# Patient Record
Sex: FEMALE | Race: OTHER | HISPANIC OR LATINO | URBAN - METROPOLITAN AREA
[De-identification: names, ages, dates, MRNs, and addresses within clinical notes are randomized per-mention and may not be internally consistent; named-entity substitution may affect disease eponyms.]

---

## 2021-10-31 ENCOUNTER — EMERGENCY (EMERGENCY)
Facility: HOSPITAL | Age: 36
LOS: 1 days | Discharge: HOME | End: 2021-10-31
Attending: STUDENT IN AN ORGANIZED HEALTH CARE EDUCATION/TRAINING PROGRAM | Admitting: EMERGENCY MEDICINE
Payer: MEDICAID

## 2021-10-31 VITALS
OXYGEN SATURATION: 100 % | RESPIRATION RATE: 17 BRPM | TEMPERATURE: 98 F | HEART RATE: 95 BPM | SYSTOLIC BLOOD PRESSURE: 123 MMHG | DIASTOLIC BLOOD PRESSURE: 69 MMHG

## 2021-10-31 LAB
ALBUMIN SERPL ELPH-MCNC: 4.3 G/DL — SIGNIFICANT CHANGE UP (ref 3.3–5)
ALP SERPL-CCNC: 95 U/L — SIGNIFICANT CHANGE UP (ref 40–120)
ALT FLD-CCNC: 18 U/L — SIGNIFICANT CHANGE UP (ref 4–33)
ANION GAP SERPL CALC-SCNC: 14 MMOL/L — SIGNIFICANT CHANGE UP (ref 7–14)
APPEARANCE UR: ABNORMAL
AST SERPL-CCNC: 18 U/L — SIGNIFICANT CHANGE UP (ref 4–32)
B PERT DNA SPEC QL NAA+PROBE: SIGNIFICANT CHANGE UP
B PERT+PARAPERT DNA PNL SPEC NAA+PROBE: SIGNIFICANT CHANGE UP
BASOPHILS # BLD AUTO: 0.05 K/UL — SIGNIFICANT CHANGE UP (ref 0–0.2)
BASOPHILS NFR BLD AUTO: 0.4 % — SIGNIFICANT CHANGE UP (ref 0–2)
BILIRUB SERPL-MCNC: <0.2 MG/DL — SIGNIFICANT CHANGE UP (ref 0.2–1.2)
BILIRUB UR-MCNC: NEGATIVE — SIGNIFICANT CHANGE UP
BLOOD GAS VENOUS COMPREHENSIVE RESULT: SIGNIFICANT CHANGE UP
BORDETELLA PARAPERTUSSIS (RAPRVP): SIGNIFICANT CHANGE UP
BUN SERPL-MCNC: 13 MG/DL — SIGNIFICANT CHANGE UP (ref 7–23)
C PNEUM DNA SPEC QL NAA+PROBE: SIGNIFICANT CHANGE UP
CALCIUM SERPL-MCNC: 9.3 MG/DL — SIGNIFICANT CHANGE UP (ref 8.4–10.5)
CHLORIDE SERPL-SCNC: 103 MMOL/L — SIGNIFICANT CHANGE UP (ref 98–107)
CO2 SERPL-SCNC: 20 MMOL/L — LOW (ref 22–31)
COLOR SPEC: SIGNIFICANT CHANGE UP
CREAT SERPL-MCNC: 0.53 MG/DL — SIGNIFICANT CHANGE UP (ref 0.5–1.3)
DIFF PNL FLD: ABNORMAL
EOSINOPHIL # BLD AUTO: 0.17 K/UL — SIGNIFICANT CHANGE UP (ref 0–0.5)
EOSINOPHIL NFR BLD AUTO: 1.3 % — SIGNIFICANT CHANGE UP (ref 0–6)
FLUAV SUBTYP SPEC NAA+PROBE: SIGNIFICANT CHANGE UP
FLUBV RNA SPEC QL NAA+PROBE: SIGNIFICANT CHANGE UP
GLUCOSE SERPL-MCNC: 93 MG/DL — SIGNIFICANT CHANGE UP (ref 70–99)
GLUCOSE UR QL: NEGATIVE — SIGNIFICANT CHANGE UP
HADV DNA SPEC QL NAA+PROBE: SIGNIFICANT CHANGE UP
HCG SERPL-ACNC: <5 MIU/ML — SIGNIFICANT CHANGE UP
HCOV 229E RNA SPEC QL NAA+PROBE: SIGNIFICANT CHANGE UP
HCOV HKU1 RNA SPEC QL NAA+PROBE: SIGNIFICANT CHANGE UP
HCOV NL63 RNA SPEC QL NAA+PROBE: SIGNIFICANT CHANGE UP
HCOV OC43 RNA SPEC QL NAA+PROBE: SIGNIFICANT CHANGE UP
HCT VFR BLD CALC: 39.4 % — SIGNIFICANT CHANGE UP (ref 34.5–45)
HGB BLD-MCNC: 12.3 G/DL — SIGNIFICANT CHANGE UP (ref 11.5–15.5)
HMPV RNA SPEC QL NAA+PROBE: SIGNIFICANT CHANGE UP
HPIV1 RNA SPEC QL NAA+PROBE: SIGNIFICANT CHANGE UP
HPIV2 RNA SPEC QL NAA+PROBE: SIGNIFICANT CHANGE UP
HPIV3 RNA SPEC QL NAA+PROBE: SIGNIFICANT CHANGE UP
HPIV4 RNA SPEC QL NAA+PROBE: SIGNIFICANT CHANGE UP
IANC: 9.64 K/UL — HIGH (ref 1.5–8.5)
IMM GRANULOCYTES NFR BLD AUTO: 0.4 % — SIGNIFICANT CHANGE UP (ref 0–1.5)
KETONES UR-MCNC: NEGATIVE — SIGNIFICANT CHANGE UP
LEUKOCYTE ESTERASE UR-ACNC: ABNORMAL
LYMPHOCYTES # BLD AUTO: 19 % — SIGNIFICANT CHANGE UP (ref 13–44)
LYMPHOCYTES # BLD AUTO: 2.52 K/UL — SIGNIFICANT CHANGE UP (ref 1–3.3)
M PNEUMO DNA SPEC QL NAA+PROBE: SIGNIFICANT CHANGE UP
MCHC RBC-ENTMCNC: 25.7 PG — LOW (ref 27–34)
MCHC RBC-ENTMCNC: 31.2 GM/DL — LOW (ref 32–36)
MCV RBC AUTO: 82.4 FL — SIGNIFICANT CHANGE UP (ref 80–100)
MONOCYTES # BLD AUTO: 0.81 K/UL — SIGNIFICANT CHANGE UP (ref 0–0.9)
MONOCYTES NFR BLD AUTO: 6.1 % — SIGNIFICANT CHANGE UP (ref 2–14)
NEUTROPHILS # BLD AUTO: 9.64 K/UL — HIGH (ref 1.8–7.4)
NEUTROPHILS NFR BLD AUTO: 72.8 % — SIGNIFICANT CHANGE UP (ref 43–77)
NITRITE UR-MCNC: NEGATIVE — SIGNIFICANT CHANGE UP
NRBC # BLD: 0 /100 WBCS — SIGNIFICANT CHANGE UP
NRBC # FLD: 0 K/UL — SIGNIFICANT CHANGE UP
PH UR: 7 — SIGNIFICANT CHANGE UP (ref 5–8)
PLATELET # BLD AUTO: 378 K/UL — SIGNIFICANT CHANGE UP (ref 150–400)
POTASSIUM SERPL-MCNC: 4.4 MMOL/L — SIGNIFICANT CHANGE UP (ref 3.5–5.3)
POTASSIUM SERPL-SCNC: 4.4 MMOL/L — SIGNIFICANT CHANGE UP (ref 3.5–5.3)
PROT SERPL-MCNC: 7.7 G/DL — SIGNIFICANT CHANGE UP (ref 6–8.3)
PROT UR-MCNC: ABNORMAL
RAPID RVP RESULT: SIGNIFICANT CHANGE UP
RBC # BLD: 4.78 M/UL — SIGNIFICANT CHANGE UP (ref 3.8–5.2)
RBC # FLD: 14.8 % — HIGH (ref 10.3–14.5)
RSV RNA SPEC QL NAA+PROBE: SIGNIFICANT CHANGE UP
RV+EV RNA SPEC QL NAA+PROBE: SIGNIFICANT CHANGE UP
SARS-COV-2 RNA SPEC QL NAA+PROBE: SIGNIFICANT CHANGE UP
SODIUM SERPL-SCNC: 137 MMOL/L — SIGNIFICANT CHANGE UP (ref 135–145)
SP GR SPEC: 1.02 — SIGNIFICANT CHANGE UP (ref 1–1.05)
UROBILINOGEN FLD QL: SIGNIFICANT CHANGE UP
WBC # BLD: 13.24 K/UL — HIGH (ref 3.8–10.5)
WBC # FLD AUTO: 13.24 K/UL — HIGH (ref 3.8–10.5)

## 2021-10-31 PROCEDURE — 74177 CT ABD & PELVIS W/CONTRAST: CPT | Mod: 26,MA

## 2021-10-31 RX ORDER — ACETAMINOPHEN 500 MG
650 TABLET ORAL ONCE
Refills: 0 | Status: COMPLETED | OUTPATIENT
Start: 2021-10-31 | End: 2021-10-31

## 2021-10-31 RX ORDER — SODIUM CHLORIDE 9 MG/ML
1000 INJECTION INTRAMUSCULAR; INTRAVENOUS; SUBCUTANEOUS ONCE
Refills: 0 | Status: COMPLETED | OUTPATIENT
Start: 2021-10-31 | End: 2021-10-31

## 2021-10-31 RX ORDER — CEFTRIAXONE 500 MG/1
1000 INJECTION, POWDER, FOR SOLUTION INTRAMUSCULAR; INTRAVENOUS ONCE
Refills: 0 | Status: COMPLETED | OUTPATIENT
Start: 2021-10-31 | End: 2021-10-31

## 2021-10-31 RX ORDER — MORPHINE SULFATE 50 MG/1
2 CAPSULE, EXTENDED RELEASE ORAL ONCE
Refills: 0 | Status: DISCONTINUED | OUTPATIENT
Start: 2021-10-31 | End: 2021-10-31

## 2021-10-31 RX ORDER — KETOROLAC TROMETHAMINE 30 MG/ML
15 SYRINGE (ML) INJECTION ONCE
Refills: 0 | Status: DISCONTINUED | OUTPATIENT
Start: 2021-10-31 | End: 2021-10-31

## 2021-10-31 RX ADMIN — Medication 650 MILLIGRAM(S): at 23:24

## 2021-10-31 RX ADMIN — Medication 15 MILLIGRAM(S): at 21:13

## 2021-10-31 RX ADMIN — MORPHINE SULFATE 2 MILLIGRAM(S): 50 CAPSULE, EXTENDED RELEASE ORAL at 23:43

## 2021-10-31 RX ADMIN — SODIUM CHLORIDE 1000 MILLILITER(S): 9 INJECTION INTRAMUSCULAR; INTRAVENOUS; SUBCUTANEOUS at 20:05

## 2021-10-31 RX ADMIN — CEFTRIAXONE 100 MILLIGRAM(S): 500 INJECTION, POWDER, FOR SOLUTION INTRAMUSCULAR; INTRAVENOUS at 23:33

## 2021-10-31 RX ADMIN — Medication 650 MILLIGRAM(S): at 21:07

## 2021-10-31 NOTE — ED PROVIDER NOTE - OBJECTIVE STATEMENT
36yF w/no stated pmhx presenting with dysuria and right flank pain x 2 days. Pt states 3 days ago she noticed burning with urination and frequent urination. States she took an antibiotic from a friend, 2 doses, does not know which one. States she then developed right sided back/flank pain with radiation to the RLQ and upper right leg. Pt states pain is constant and at times sharp associated with nausea. Pt has been taking Ibuprofen and tylenol at home for pain without relief. She reports chills with urination. Pt denies fever, weakness, vomiting, diarrhea, vaginal discharge, cp, sob, headache, dizziness, recent travel or illness or any other concerns.

## 2021-10-31 NOTE — ED ADULT NURSE NOTE - NS ED NURSE RECORD ANOTHER HT AND WT
Patient is here today for a follow up on her right foot pain   She was trying on some shoes last week, when her toes \"curled down\" in a \"spasm\" No

## 2021-10-31 NOTE — ED PROVIDER NOTE - CLINICAL SUMMARY MEDICAL DECISION MAKING FREE TEXT BOX
36yF w/no stated pmhx presenting with dysuria and right flank pain x 2 days. On exam pt appears uncomfortable, vitals within normal, afebrile, +right CVA tenderness, RLQ/RUQ/suprapubic ttp. Concern for pyelo, less likely appy. Plan: cbc/cmp/vbg, ua/ucx, CT abd/pelvis, IV fluids, pain control, will reassess.

## 2021-10-31 NOTE — ED PROVIDER NOTE - PROGRESS NOTE DETAILS
MITCHELL Rebolledo: CT consistent with pyelo, pt states pain improved after morphine although continues to be uncomfortable. Will keep in CDU for observation and pain control. Marcelle DWYER: (Back Charting) Pt signed out to my colleague Dr. Schulte pending labs ct studies reassess thereafter.

## 2021-10-31 NOTE — ED PROVIDER NOTE - ATTENDING CONTRIBUTION TO CARE
I have personally performed a face to face medical and diagnostic evaluation of the patient. I have discussed with and reviewed the ACP's note and agree with the History, ROS, Physical Exam and MDM unless otherwise indicated. A brief summary of my personal evaluation and impression can be found below.    36F no pmh presents w dysuria and R flank pain x2 days but thinks she had burning with urination for last week thinks c/w prior uti/stone? took x2 abx unknown what kind yesterday w/o improvement, on interval has developed R flank and back pain, nausea no vomiting tolerating PO pain radiates to lower abdomen but she reports pain is worse to R flank.     All other ROS negative, except as above and as per HPI and ROS section.    VITALS: Initial triage and subsequent vitals have been reviewed by me.  GEN APPEARANCE: WDWN, alert, non-toxic, NAD  HEAD: Atraumatic.  EYES: PERRLa, EOMI, vision grossly intact.   NECK: Supple  CV: RRR, S1S2, no c/r/m/g. Cap refill <2 seconds. No bruits.   LUNGS: CTAB. No abnormal breath sounds.  ABDOMEN: mild RUQ ttp.   MSK/EXT: No spinal or extremity point tenderness. R CVA ttp. Pelvis stable. No peripheral edema.  NEURO: Alert, follows commands. Weight bearing normal. Speech normal. Sensation and motor normal x4 extremities.   SKIN: Warm, dry and intact. No rash.  PSYCH: Appropriate    Plan/MDM: 36F no pmh presents w dysuria and R flank pain x2 days but thinks she had burning with urination for last week thinks c/w prior uti/stone? took x2 abx unknown what kind yesterday w/o improvement, on interval has developed R flank and back pain, nausea no vomiting tolerating PO pain radiates to lower abdomen but she reports pain is worse to R flank exam vss non toxic PE as above ddx c/f likely uti/pyelo less c/f intraabdominal surg path plan to start with labs urine ct, reassess thereafter consider ruq/tvus US if initial workup nondx.

## 2021-10-31 NOTE — ED ADULT TRIAGE NOTE - CHIEF COMPLAINT QUOTE
Pt c/o right sided flank pain starting 2 days ago worsening today. Pt also endorsing mild dysuria. Denies hematuria. Pt appears uncomfortable

## 2021-11-01 VITALS
RESPIRATION RATE: 16 BRPM | HEART RATE: 75 BPM | TEMPERATURE: 98 F | OXYGEN SATURATION: 100 % | SYSTOLIC BLOOD PRESSURE: 104 MMHG | DIASTOLIC BLOOD PRESSURE: 52 MMHG

## 2021-11-01 PROCEDURE — 99236 HOSP IP/OBS SAME DATE HI 85: CPT

## 2021-11-01 RX ORDER — OXYCODONE AND ACETAMINOPHEN 5; 325 MG/1; MG/1
1 TABLET ORAL EVERY 4 HOURS
Refills: 0 | Status: DISCONTINUED | OUTPATIENT
Start: 2021-11-01 | End: 2021-11-01

## 2021-11-01 RX ORDER — CEFTRIAXONE 500 MG/1
1000 INJECTION, POWDER, FOR SOLUTION INTRAMUSCULAR; INTRAVENOUS EVERY 24 HOURS
Refills: 0 | Status: DISCONTINUED | OUTPATIENT
Start: 2021-11-01 | End: 2021-11-04

## 2021-11-01 RX ORDER — SODIUM CHLORIDE 9 MG/ML
1000 INJECTION, SOLUTION INTRAVENOUS
Refills: 0 | Status: DISCONTINUED | OUTPATIENT
Start: 2021-11-01 | End: 2021-11-04

## 2021-11-01 RX ORDER — KETOROLAC TROMETHAMINE 30 MG/ML
30 SYRINGE (ML) INJECTION EVERY 6 HOURS
Refills: 0 | Status: DISCONTINUED | OUTPATIENT
Start: 2021-11-01 | End: 2021-11-01

## 2021-11-01 RX ORDER — CEFUROXIME AXETIL 250 MG
1 TABLET ORAL
Qty: 14 | Refills: 0
Start: 2021-11-01 | End: 2021-11-07

## 2021-11-01 RX ADMIN — OXYCODONE AND ACETAMINOPHEN 1 TABLET(S): 5; 325 TABLET ORAL at 04:00

## 2021-11-01 RX ADMIN — OXYCODONE AND ACETAMINOPHEN 1 TABLET(S): 5; 325 TABLET ORAL at 02:43

## 2021-11-01 RX ADMIN — SODIUM CHLORIDE 75 MILLILITER(S): 9 INJECTION, SOLUTION INTRAVENOUS at 02:31

## 2021-11-01 NOTE — ED CDU PROVIDER INITIAL DAY NOTE - MEDICAL DECISION MAKING DETAILS
#pyelonephritis   plan  - IV ceftriaxone   - analgesia - trial PO meds   - ivf  - d/c on Cefpodoxime

## 2021-11-01 NOTE — ED CDU PROVIDER INITIAL DAY NOTE - NSICDXNOFAMILYHX_GEN_ALL_ED
Airway  Urgency: elective      General Information and Staff    Patient location during procedure: OR  Anesthesiologist: Cristian Reyez DO  Performed: anesthesiologist     Indications and Patient Condition  Indications for airway management: anesthesia  Sedat
<-- Click to add NO pertinent Family History

## 2021-11-01 NOTE — ED CDU PROVIDER INITIAL DAY NOTE - OBJECTIVE STATEMENT
36yF w/no stated pmhx presenting with dysuria and right flank pain x 2 days. Pt states 3 days ago she noticed burning with urination and frequent urination. States she took an antibiotic from a friend, 2 doses, does not know which one. States she then developed right sided back/flank pain with radiation to the RLQ and upper right leg. Pt states pain is constant and at times sharp associated with nausea. Pt has been taking Ibuprofen and tylenol at home for pain without relief. She reports chills with urination. Pt denies fever, weakness, vomiting, diarrhea, vaginal discharge, cp, sob, headache, dizziness, recent travel or illness or any other concerns.  While in ED, Labs shows leukocytosis. CTAP shows R pyelonephritis. Pt given rocephin. Pain controlled with IV analgesia. Plan to keep in CDU overnight for continued pain control and IV abx. If pain controlled tomorrow with PO meds can likely be d/c with PO abx. Pt well appearing.

## 2021-11-01 NOTE — ED CDU PROVIDER INITIAL DAY NOTE - ATTENDING CONTRIBUTION TO CARE
I have personally performed a face to face medical and diagnostic evaluation of the patient. I have discussed with and reviewed the ACP's note and agree with the History, ROS, Physical Exam and MDM unless otherwise indicated. A brief summary of my personal evaluation and impression can be found below.    Please see initial ED provider note for further details.

## 2021-11-01 NOTE — ED CDU PROVIDER DISPOSITION NOTE - CLINICAL COURSE
35 y/o female with no pmhx presents to ED c/o right flank pain and dysuria x 3 days. CT with pyelonephritis vs passed kidney stone. pain well controlled in CDU. feeling better. plan to dc home with cefuroxime.

## 2021-11-01 NOTE — ED CDU PROVIDER DISPOSITION NOTE - PATIENT PORTAL LINK FT
You can access the FollowMyHealth Patient Portal offered by [facility] by registering at the following website: [email or web address]. By joining [facility]’s FollowMyHealth portal, you will also be able to view your health information using other applications (apps) compatible with our system.

## 2021-11-01 NOTE — ED CDU PROVIDER DISPOSITION NOTE - NSFOLLOWUPINSTRUCTIONS_ED_ALL_ED_FT
Follow up with your primary care provider within 48 hours.  Take copy of results with you    Take Cefuroxime antibiotic as prescribed    Worsening, continued or new concerning symptoms return to the emergency department.

## 2021-11-03 LAB
CULTURE RESULTS: SIGNIFICANT CHANGE UP
SPECIMEN SOURCE: SIGNIFICANT CHANGE UP

## 2021-11-25 ENCOUNTER — EMERGENCY (EMERGENCY)
Facility: HOSPITAL | Age: 36
LOS: 1 days | Discharge: HOME | End: 2021-11-25
Attending: EMERGENCY MEDICINE | Admitting: EMERGENCY MEDICINE
Payer: MEDICAID

## 2021-11-25 VITALS
DIASTOLIC BLOOD PRESSURE: 77 MMHG | OXYGEN SATURATION: 97 % | HEART RATE: 95 BPM | TEMPERATURE: 98 F | RESPIRATION RATE: 18 BRPM | SYSTOLIC BLOOD PRESSURE: 119 MMHG

## 2021-11-25 LAB
BASOPHILS # BLD AUTO: 0.05 K/UL — SIGNIFICANT CHANGE UP (ref 0–0.2)
BASOPHILS NFR BLD AUTO: 0.5 % — SIGNIFICANT CHANGE UP (ref 0–2)
BLOOD GAS VENOUS COMPREHENSIVE RESULT: SIGNIFICANT CHANGE UP
EOSINOPHIL # BLD AUTO: 0.09 K/UL — SIGNIFICANT CHANGE UP (ref 0–0.5)
EOSINOPHIL NFR BLD AUTO: 0.9 % — SIGNIFICANT CHANGE UP (ref 0–6)
HCT VFR BLD CALC: 39.2 % — SIGNIFICANT CHANGE UP (ref 34.5–45)
HGB BLD-MCNC: 12.6 G/DL — SIGNIFICANT CHANGE UP (ref 11.5–15.5)
IANC: 7.73 K/UL — SIGNIFICANT CHANGE UP (ref 1.5–8.5)
IMM GRANULOCYTES NFR BLD AUTO: 0.3 % — SIGNIFICANT CHANGE UP (ref 0–1.5)
LYMPHOCYTES # BLD AUTO: 2.1 K/UL — SIGNIFICANT CHANGE UP (ref 1–3.3)
LYMPHOCYTES # BLD AUTO: 20.1 % — SIGNIFICANT CHANGE UP (ref 13–44)
MCHC RBC-ENTMCNC: 26.1 PG — LOW (ref 27–34)
MCHC RBC-ENTMCNC: 32.1 GM/DL — SIGNIFICANT CHANGE UP (ref 32–36)
MCV RBC AUTO: 81.2 FL — SIGNIFICANT CHANGE UP (ref 80–100)
MONOCYTES # BLD AUTO: 0.44 K/UL — SIGNIFICANT CHANGE UP (ref 0–0.9)
MONOCYTES NFR BLD AUTO: 4.2 % — SIGNIFICANT CHANGE UP (ref 2–14)
NEUTROPHILS # BLD AUTO: 7.73 K/UL — HIGH (ref 1.8–7.4)
NEUTROPHILS NFR BLD AUTO: 74 % — SIGNIFICANT CHANGE UP (ref 43–77)
NRBC # BLD: 0 /100 WBCS — SIGNIFICANT CHANGE UP
NRBC # FLD: 0 K/UL — SIGNIFICANT CHANGE UP
PLATELET # BLD AUTO: 406 K/UL — HIGH (ref 150–400)
RBC # BLD: 4.83 M/UL — SIGNIFICANT CHANGE UP (ref 3.8–5.2)
RBC # FLD: 14.9 % — HIGH (ref 10.3–14.5)
WBC # BLD: 10.44 K/UL — SIGNIFICANT CHANGE UP (ref 3.8–10.5)
WBC # FLD AUTO: 10.44 K/UL — SIGNIFICANT CHANGE UP (ref 3.8–10.5)

## 2021-11-25 PROCEDURE — 99285 EMERGENCY DEPT VISIT HI MDM: CPT

## 2021-11-25 RX ORDER — KETOROLAC TROMETHAMINE 30 MG/ML
30 SYRINGE (ML) INJECTION ONCE
Refills: 0 | Status: DISCONTINUED | OUTPATIENT
Start: 2021-11-25 | End: 2021-11-25

## 2021-11-25 RX ORDER — SODIUM CHLORIDE 9 MG/ML
1000 INJECTION INTRAMUSCULAR; INTRAVENOUS; SUBCUTANEOUS ONCE
Refills: 0 | Status: COMPLETED | OUTPATIENT
Start: 2021-11-25 | End: 2021-11-25

## 2021-11-25 RX ADMIN — Medication 30 MILLIGRAM(S): at 21:55

## 2021-11-25 RX ADMIN — Medication 30 MILLIGRAM(S): at 22:10

## 2021-11-25 RX ADMIN — SODIUM CHLORIDE 1000 MILLILITER(S): 9 INJECTION INTRAMUSCULAR; INTRAVENOUS; SUBCUTANEOUS at 21:55

## 2021-11-25 NOTE — ED PROVIDER NOTE - OBJECTIVE STATEMENT
HPI- Patient is a 36 y.o female with no known PMHx who presents to ED c/o Rt side flank pain and dysuria. Pt states that yesterday she noticed some mild dysuria and then today she was eating dinner and suddenly got this sharp Rt side flank pain. Admits to taking tylenol with little relief. Pt seen here 2 weeks ago and was admitted to CDU for flank pain and pyelonephritis and was treated with cefuroxime. Pt notes some chills. Denies fevers, n/v/d, melena, hematuria, vaginal bleeding/discharge, dizziness, weakness, neck pain, headaches, cp, sob, URI sx or any other complaints.

## 2021-11-25 NOTE — ED ADULT NURSE NOTE - NSSUHOSCREENINGYN_ED_ALL_ED
Greyson Hudson III 51 y.o. male with HIV, ESRD T/TH/S, HIV, HTN, HLD presents to the hospital with a chief complaint of seizure. He reports experiencing a seizure on HD today. He is unsure of how long it lasted, he reports he returned to baseline after. He denies any complaints currently. He repors he immediately returned to baseline after seizure. He denies frequent seizure activity. He had one seizure 2 weeks ago, but reports prior to that he did not have one for many years. He is unsure of what medications he takes. He dialyzes for 3.5hrs at [facility]. He reports his access is a left arm graft. He still makes urine. He denies any benadryl use. He reports he does not sleep at night. He is unsure of his pre/post weight and fluid removal. He endorses seizure. He denies any other complaints. He denies any active bleeding.    In the ED, hemoglobin 6.7, potassium 4.4, Cr of 10, , lactate 1.4, chest x-ray without acute process.   
Yes - the patient is able to be screened

## 2021-11-25 NOTE — ED ADULT NURSE NOTE - NSFALLRSKASSESSTYPE_ED_ALL_ED
51M smoker with hx HTN, HLD (Untreated), family history of premature CAD (Identical twin brother  of MI at age 42) presented to ED with 3 weeks history of CP       Chest Pain:   Trop neg  EKG No acute changes  CTA of Coronary Arteries planned  ASA    HTN  Moniotor BP    Smoking  Cessation d/w pt    HLD  Ccheck Lipids, statin Initial (On Arrival)

## 2021-11-25 NOTE — ED ADULT NURSE NOTE - OBJECTIVE STATEMENT
35yo female received in 3. pt A&Ox4, ambulatory, hx of kidney stones. 2 weeks ago pt stated that she was in the ED and was treated for UTI. pt stated that she completed 7 days course of antibiotics. Pt stated that today around 6 pm she developed left flank pain.  pt denies nauseas, vomiting, and diarrhea. Denies hematuria but reported dysuria. 20G Iv placed in LAC, lab drawn, urine obtained and sent. . Side rails up, bed at lowest position, call bell within reach, patient oriented to the unit, safety maintained. 37yo female received in 3. pt A&Ox4, ambulatory, hx of kidney stones. 2 weeks ago pt stated that she was in the ED and was treated for UTI. pt stated that she completed 7 days course of antibiotics. Pt stated that today around 6 pm she developed left flank pain.  pt denies nauseas, vomiting, and diarrhea. Denies hematuria but reported dysuria. 20G Iv placed in L hand, lab drawn, urine obtained and sent. . Side rails up, bed at lowest position, call bell within reach, patient oriented to the unit, safety maintained.

## 2021-11-25 NOTE — ED PROVIDER NOTE - NSFOLLOWUPINSTRUCTIONS_ED_ALL_ED_FT
Patient advised to follow up with PRIMARY CARE DOCTOR IN 1-2 DAYS AND UROLOGIST AND GASTROENTEROLOGIST  and told to return to the emergency department immediately for any new or concerning symptoms such as FEVERS, CHILLS, NAUSEA, VOMITING, WORSENING PAIN OR ANY OTHER COMPLAINTS. Patient agrees with plan.      Rest, stay hydrated   Take antibiotic as directed   urology clinic (call [phone] to make an appointment)    Advance activity as tolerated.  Continue all previously prescribed medications as directed unless otherwise instructed.  Follow up with your primary care physician in 48-72 hours- bring copies of your results.  Return to the ER for worsening or persistent symptoms, and/or ANY NEW OR CONCERNING SYMPTOMS. If you have issues obtaining follow up, please call: [phone] to obtain a doctor or specialist who takes your insurance in your area.  You may call [phone] to make an appointment with the internal medicine clinic.

## 2021-11-25 NOTE — ED PROVIDER NOTE - ATTENDING CONTRIBUTION TO CARE
I have seen and examined the patient on the patient´s visit date. I have reviewed the note written by Miguelina [facility], on that visit day. I have supervised and participated as necessary in the performance of procedures indicated for patient management and was available at all phases of the patient´s visit when needed. We discussed the history, physical exam findings, management plan, and  medical decision making. I have made my additions, exceptions, and revisions within the chart and I agree with H and P as documented in its entirety. The data and my interpretation of any data collected from labs, interventions and imaging appear below as well as my independent medical decision making and considerations    The patient is a 36y Female who has no pertinent past medical history PTED with Flank pain as described    Vital Signs Stable  PE: as described; my additions and exceptions are noted in the chart  DATA:    RESULTS: All significant/relevant labs and imaging results present during the time of evaluation have been entered into chart through pullset function and are discussed below    MDM:  IMPRESSION: IPMN during screening for flank pain  Considerations; No immediate concerns but need for followup and imaging stressed; pt understands    PLAN  symptomatic treatment  RTED PRN   F/u pcp/gi for repeat imaging as recommended

## 2021-11-25 NOTE — ED ADULT TRIAGE NOTE - CHIEF COMPLAINT QUOTE
C/o intermittent right flank pain for 2-3 weeks. Endorses dysuria and chills for 2 days. States "I was here 2 weeks ago and told I have a kidney infection but the pain is back." Denies fever. Returned from [facility] on 11/12.

## 2021-11-25 NOTE — ED PROVIDER NOTE - PHYSICAL EXAMINATION
Vital signs reviewed.   CONSTITUTIONAL: Well-appearing; well-nourished; in no apparent distress. Non-toxic appearing.   HEAD: Normocephalic, atraumatic.  EYES: PERRL, EOM intact, conjunctiva and sclera WNL.  CARD: Normal S1, S2; no murmurs, rubs, or gallops noted.  RESP: Normal chest excursion with respiration; breath sounds clear and equal bilaterally; no wheezes, rhonchi, or rales.  ABD/GI: soft, non-distended; +Rt CVAT tenderness.   EXT/MS: moves all extremities  SKIN: Normal for age and race  NEURO: Awake, alert, oriented x 3, no gross deficits  PSYCH: Normal mood; appropriate affect.

## 2021-11-25 NOTE — ED PROVIDER NOTE - PROGRESS NOTE DETAILS
PA Villeda- Results of CT ? for IPMN. No acute infection. No obstruction stone. Pt labs WNL. Vitals stable. Pt stable for outpatient f/u with Urology and GI. All questions and concerns addressed. Strict return instructions given. No complaints noted.

## 2021-11-25 NOTE — ED PROVIDER NOTE - PATIENT PORTAL LINK FT
You can access the FollowMyHealth Patient Portal offered by [facility] by registering at the following website: [email or web address]. By joining [facility]’s FollowMyHealth portal, you will also be able to view your health information using other applications (apps) compatible with our system.

## 2021-11-25 NOTE — ED ADULT NURSE NOTE - CHIEF COMPLAINT QUOTE
C/o intermittent right flank pain for 2-3 weeks. Endorses dysuria and chills for 2 days. States "I was here 2 weeks ago and told I have a kidney infection but the pain is back." Denies fever. Returned from [facility] on 11/12.

## 2021-11-25 NOTE — ED PROVIDER NOTE - CLINICAL SUMMARY MEDICAL DECISION MAKING FREE TEXT BOX
HPI- Patient is a 36 y.o female with no known PMHx who presents to ED c/o Rt side flank pain and dysuria. Pt states that yesterday she noticed some mild dysuria and then today she was eating dinner and suddenly got this sharp Rt side flank pain. Admits to taking tylenol with little relief. Pt seen here 2 weeks ago and was admitted to CDU for flank pain and pyelonephritis and was treated with cefuroxime. Pt notes some chills. Denies fevers, n/v/d, melena, hematuria, vaginal bleeding/discharge, dizziness, weakness, neck pain, headaches, cp, sob, URI sx or any other complaints.     DDx- Renal colic vs pyelo. Plan for stone hunt, labs, UA/UC, fluids and analgesic. Will monitor and reassess.

## 2021-11-26 VITALS
SYSTOLIC BLOOD PRESSURE: 108 MMHG | HEART RATE: 84 BPM | OXYGEN SATURATION: 99 % | TEMPERATURE: 98 F | DIASTOLIC BLOOD PRESSURE: 78 MMHG | RESPIRATION RATE: 18 BRPM

## 2021-11-26 LAB
ALBUMIN SERPL ELPH-MCNC: 4.1 G/DL — SIGNIFICANT CHANGE UP (ref 3.3–5)
ALP SERPL-CCNC: 96 U/L — SIGNIFICANT CHANGE UP (ref 40–120)
ALT FLD-CCNC: 20 U/L — SIGNIFICANT CHANGE UP (ref 4–33)
ANION GAP SERPL CALC-SCNC: 11 MMOL/L — SIGNIFICANT CHANGE UP (ref 7–14)
APPEARANCE UR: CLEAR — SIGNIFICANT CHANGE UP
AST SERPL-CCNC: 20 U/L — SIGNIFICANT CHANGE UP (ref 4–32)
BILIRUB SERPL-MCNC: <0.2 MG/DL — SIGNIFICANT CHANGE UP (ref 0.2–1.2)
BILIRUB UR-MCNC: NEGATIVE — SIGNIFICANT CHANGE UP
BUN SERPL-MCNC: 17 MG/DL — SIGNIFICANT CHANGE UP (ref 7–23)
CALCIUM SERPL-MCNC: 9.5 MG/DL — SIGNIFICANT CHANGE UP (ref 8.4–10.5)
CHLORIDE SERPL-SCNC: 102 MMOL/L — SIGNIFICANT CHANGE UP (ref 98–107)
CO2 SERPL-SCNC: 23 MMOL/L — SIGNIFICANT CHANGE UP (ref 22–31)
COLOR SPEC: YELLOW — SIGNIFICANT CHANGE UP
CREAT SERPL-MCNC: 0.71 MG/DL — SIGNIFICANT CHANGE UP (ref 0.5–1.3)
DIFF PNL FLD: ABNORMAL
GLUCOSE SERPL-MCNC: 107 MG/DL — HIGH (ref 70–99)
GLUCOSE UR QL: NEGATIVE — SIGNIFICANT CHANGE UP
KETONES UR-MCNC: NEGATIVE — SIGNIFICANT CHANGE UP
LEUKOCYTE ESTERASE UR-ACNC: ABNORMAL
LIDOCAIN IGE QN: 31 U/L — SIGNIFICANT CHANGE UP (ref 7–60)
NITRITE UR-MCNC: NEGATIVE — SIGNIFICANT CHANGE UP
PH UR: 6 — SIGNIFICANT CHANGE UP (ref 5–8)
POTASSIUM SERPL-MCNC: 4 MMOL/L — SIGNIFICANT CHANGE UP (ref 3.5–5.3)
POTASSIUM SERPL-SCNC: 4 MMOL/L — SIGNIFICANT CHANGE UP (ref 3.5–5.3)
PROT SERPL-MCNC: 7.4 G/DL — SIGNIFICANT CHANGE UP (ref 6–8.3)
PROT UR-MCNC: ABNORMAL
SODIUM SERPL-SCNC: 136 MMOL/L — SIGNIFICANT CHANGE UP (ref 135–145)
SP GR SPEC: 1.04 — SIGNIFICANT CHANGE UP (ref 1–1.05)
UROBILINOGEN FLD QL: SIGNIFICANT CHANGE UP

## 2021-11-26 PROCEDURE — 74176 CT ABD & PELVIS W/O CONTRAST: CPT | Mod: 26,ME

## 2021-11-26 PROCEDURE — G1004: CPT

## 2021-11-28 LAB
CULTURE RESULTS: SIGNIFICANT CHANGE UP
SPECIMEN SOURCE: SIGNIFICANT CHANGE UP

## 2022-06-08 ENCOUNTER — EMERGENCY (EMERGENCY)
Facility: HOSPITAL | Age: 37
LOS: 1 days | Discharge: HOME | End: 2022-06-08
Attending: EMERGENCY MEDICINE | Admitting: EMERGENCY MEDICINE
Payer: MEDICAID

## 2022-06-08 VITALS
OXYGEN SATURATION: 99 % | RESPIRATION RATE: 16 BRPM | HEART RATE: 84 BPM | DIASTOLIC BLOOD PRESSURE: 75 MMHG | TEMPERATURE: 97 F | SYSTOLIC BLOOD PRESSURE: 129 MMHG

## 2022-06-08 VITALS
TEMPERATURE: 97 F | HEART RATE: 83 BPM | SYSTOLIC BLOOD PRESSURE: 128 MMHG | DIASTOLIC BLOOD PRESSURE: 73 MMHG | OXYGEN SATURATION: 99 % | RESPIRATION RATE: 18 BRPM

## 2022-06-08 LAB
ALBUMIN SERPL ELPH-MCNC: 4.4 G/DL — SIGNIFICANT CHANGE UP (ref 3.3–5)
ALP SERPL-CCNC: 95 U/L — SIGNIFICANT CHANGE UP (ref 40–120)
ALT FLD-CCNC: 22 U/L — SIGNIFICANT CHANGE UP (ref 4–33)
ANION GAP SERPL CALC-SCNC: 12 MMOL/L — SIGNIFICANT CHANGE UP (ref 7–14)
APPEARANCE UR: ABNORMAL
AST SERPL-CCNC: 18 U/L — SIGNIFICANT CHANGE UP (ref 4–32)
BACTERIA # UR AUTO: ABNORMAL
BASOPHILS # BLD AUTO: 0.04 K/UL — SIGNIFICANT CHANGE UP (ref 0–0.2)
BASOPHILS NFR BLD AUTO: 0.4 % — SIGNIFICANT CHANGE UP (ref 0–2)
BILIRUB SERPL-MCNC: <0.2 MG/DL — SIGNIFICANT CHANGE UP (ref 0.2–1.2)
BILIRUB UR-MCNC: NEGATIVE — SIGNIFICANT CHANGE UP
BUN SERPL-MCNC: 11 MG/DL — SIGNIFICANT CHANGE UP (ref 7–23)
CALCIUM SERPL-MCNC: 9.3 MG/DL — SIGNIFICANT CHANGE UP (ref 8.4–10.5)
CHLORIDE SERPL-SCNC: 105 MMOL/L — SIGNIFICANT CHANGE UP (ref 98–107)
CO2 SERPL-SCNC: 22 MMOL/L — SIGNIFICANT CHANGE UP (ref 22–31)
COLOR SPEC: YELLOW — SIGNIFICANT CHANGE UP
CREAT SERPL-MCNC: 0.61 MG/DL — SIGNIFICANT CHANGE UP (ref 0.5–1.3)
DIFF PNL FLD: ABNORMAL
EGFR: 118 ML/MIN/1.73M2 — SIGNIFICANT CHANGE UP
EOSINOPHIL # BLD AUTO: 0.14 K/UL — SIGNIFICANT CHANGE UP (ref 0–0.5)
EOSINOPHIL NFR BLD AUTO: 1.5 % — SIGNIFICANT CHANGE UP (ref 0–6)
EPI CELLS # UR: 1 /HPF — SIGNIFICANT CHANGE UP (ref 0–5)
GLUCOSE SERPL-MCNC: 87 MG/DL — SIGNIFICANT CHANGE UP (ref 70–99)
GLUCOSE UR QL: NEGATIVE — SIGNIFICANT CHANGE UP
HCG SERPL-ACNC: <5 MIU/ML — SIGNIFICANT CHANGE UP
HCT VFR BLD CALC: 40.3 % — SIGNIFICANT CHANGE UP (ref 34.5–45)
HGB BLD-MCNC: 12.7 G/DL — SIGNIFICANT CHANGE UP (ref 11.5–15.5)
HYALINE CASTS # UR AUTO: 1 /LPF — SIGNIFICANT CHANGE UP (ref 0–7)
IANC: 6.72 K/UL — SIGNIFICANT CHANGE UP (ref 1.8–7.4)
IMM GRANULOCYTES NFR BLD AUTO: 0.3 % — SIGNIFICANT CHANGE UP (ref 0–1.5)
KETONES UR-MCNC: NEGATIVE — SIGNIFICANT CHANGE UP
LEUKOCYTE ESTERASE UR-ACNC: ABNORMAL
LYMPHOCYTES # BLD AUTO: 2.13 K/UL — SIGNIFICANT CHANGE UP (ref 1–3.3)
LYMPHOCYTES # BLD AUTO: 22.3 % — SIGNIFICANT CHANGE UP (ref 13–44)
MCHC RBC-ENTMCNC: 26.2 PG — LOW (ref 27–34)
MCHC RBC-ENTMCNC: 31.5 GM/DL — LOW (ref 32–36)
MCV RBC AUTO: 83.1 FL — SIGNIFICANT CHANGE UP (ref 80–100)
MONOCYTES # BLD AUTO: 0.5 K/UL — SIGNIFICANT CHANGE UP (ref 0–0.9)
MONOCYTES NFR BLD AUTO: 5.2 % — SIGNIFICANT CHANGE UP (ref 2–14)
NEUTROPHILS # BLD AUTO: 6.72 K/UL — SIGNIFICANT CHANGE UP (ref 1.8–7.4)
NEUTROPHILS NFR BLD AUTO: 70.3 % — SIGNIFICANT CHANGE UP (ref 43–77)
NITRITE UR-MCNC: POSITIVE
NRBC # BLD: 0 /100 WBCS — SIGNIFICANT CHANGE UP
NRBC # FLD: 0 K/UL — SIGNIFICANT CHANGE UP
PH UR: 6 — SIGNIFICANT CHANGE UP (ref 5–8)
PLATELET # BLD AUTO: 397 K/UL — SIGNIFICANT CHANGE UP (ref 150–400)
POTASSIUM SERPL-MCNC: 4.2 MMOL/L — SIGNIFICANT CHANGE UP (ref 3.5–5.3)
POTASSIUM SERPL-SCNC: 4.2 MMOL/L — SIGNIFICANT CHANGE UP (ref 3.5–5.3)
PROT SERPL-MCNC: 7.8 G/DL — SIGNIFICANT CHANGE UP (ref 6–8.3)
PROT UR-MCNC: ABNORMAL
RBC # BLD: 4.85 M/UL — SIGNIFICANT CHANGE UP (ref 3.8–5.2)
RBC # FLD: 14.8 % — HIGH (ref 10.3–14.5)
RBC CASTS # UR COMP ASSIST: 30 /HPF — HIGH (ref 0–4)
SODIUM SERPL-SCNC: 139 MMOL/L — SIGNIFICANT CHANGE UP (ref 135–145)
SP GR SPEC: 1.02 — SIGNIFICANT CHANGE UP (ref 1–1.05)
UROBILINOGEN FLD QL: SIGNIFICANT CHANGE UP
WBC # BLD: 9.56 K/UL — SIGNIFICANT CHANGE UP (ref 3.8–10.5)
WBC # FLD AUTO: 9.56 K/UL — SIGNIFICANT CHANGE UP (ref 3.8–10.5)
WBC UR QL: 26 /HPF — HIGH (ref 0–5)

## 2022-06-08 PROCEDURE — 99285 EMERGENCY DEPT VISIT HI MDM: CPT

## 2022-06-08 PROCEDURE — 76830 TRANSVAGINAL US NON-OB: CPT | Mod: 26

## 2022-06-08 RX ORDER — SODIUM CHLORIDE 9 MG/ML
1000 INJECTION INTRAMUSCULAR; INTRAVENOUS; SUBCUTANEOUS ONCE
Refills: 0 | Status: COMPLETED | OUTPATIENT
Start: 2022-06-08 | End: 2022-06-08

## 2022-06-08 RX ORDER — CEFTRIAXONE 500 MG/1
1000 INJECTION, POWDER, FOR SOLUTION INTRAMUSCULAR; INTRAVENOUS ONCE
Refills: 0 | Status: COMPLETED | OUTPATIENT
Start: 2022-06-08 | End: 2022-06-08

## 2022-06-08 RX ORDER — MORPHINE SULFATE 50 MG/1
4 CAPSULE, EXTENDED RELEASE ORAL ONCE
Refills: 0 | Status: DISCONTINUED | OUTPATIENT
Start: 2022-06-08 | End: 2022-06-08

## 2022-06-08 RX ORDER — CEPHALEXIN 500 MG
1 CAPSULE ORAL
Qty: 14 | Refills: 0
Start: 2022-06-08 | End: 2022-06-14

## 2022-06-08 RX ORDER — PHENAZOPYRIDINE HCL 100 MG
100 TABLET ORAL ONCE
Refills: 0 | Status: COMPLETED | OUTPATIENT
Start: 2022-06-08 | End: 2022-06-08

## 2022-06-08 RX ADMIN — Medication 100 MILLIGRAM(S): at 19:04

## 2022-06-08 RX ADMIN — SODIUM CHLORIDE 1000 MILLILITER(S): 9 INJECTION INTRAMUSCULAR; INTRAVENOUS; SUBCUTANEOUS at 17:30

## 2022-06-08 RX ADMIN — MORPHINE SULFATE 4 MILLIGRAM(S): 50 CAPSULE, EXTENDED RELEASE ORAL at 17:29

## 2022-06-08 RX ADMIN — CEFTRIAXONE 100 MILLIGRAM(S): 500 INJECTION, POWDER, FOR SOLUTION INTRAMUSCULAR; INTRAVENOUS at 18:44

## 2022-06-08 NOTE — ED PROVIDER NOTE - OBJECTIVE STATEMENT
37y female pt G2A2 PMHx asthma who presents to ED for L pelvic pain and pelvic bleeding. Endorses pain began last night and this AM notice vaginal bleeding. 37y female pt G2A2 PMHx asthma who presents to ED for L pelvic pain and pelvic bleeding. Endorses pain began last night and this AM noticed heavy vaginal bleeding. Denies clots and has changed pads twice today. LMP: april and endorses spotting this past month. Patient is sexually active with male partner and denies using contraceptives. Endorsing nausea and one episode of emesis 2 weeks ago. Denies diarrhea, chest pain, SOB, fevers, etc.

## 2022-06-08 NOTE — ED PROVIDER NOTE - CLINICAL SUMMARY MEDICAL DECISION MAKING FREE TEXT BOX
37y female pt who presents to ED with L pelvic pain and vaginal bleeding. On exam found w tenderness of LLQ and open cervical os w L adnexal tenderness. Concerns for ectopic pregnancy vs ovarian torsion vs fetal demise. Will reassess after medication. Will draw labs.

## 2022-06-08 NOTE — ED ADULT NURSE NOTE - EXTENSIONS OF SELF_ADULT
Duration Of Freeze Thaw-Cycle (Seconds): 0 Consent: The patient's consent was obtained including but not limited to risks of crusting, scabbing, blistering, scarring, darker or lighter pigmentary change, recurrence, incomplete removal and infection. Render Note In Bullet Format When Appropriate: No Post-Care Instructions: I reviewed with the patient in detail post-care instructions. Patient is to wear sunprotection, and avoid picking at any of the treated lesions. Pt may apply Vaseline to crusted or scabbing areas. Detail Level: Detailed Render Post-Care Instructions In Note?: yes None

## 2022-06-08 NOTE — ED ADULT NURSE NOTE - ISOLATION TYPE:
None Price (Do Not Change): 0.00 Instructions: This plan will send the code FBSD to the PM system.  DO NOT or CHANGE the price. Detail Level: Simple

## 2022-06-08 NOTE — ED PROVIDER NOTE - ATTENDING CONTRIBUTION TO CARE
37y female pt G2A2 PMHx asthma who presents to ED for L pelvic pain and pelvic bleeding. Endorses pain began last night and this AM noticed heavy vaginal bleeding. Denies clots and has changed pads twice today. LMP: april and endorses spotting this past month. Patient is sexually active with male partner and denies using contraceptives. Endorsing nausea and one episode of emesis 2 weeks ago. Denies diarrhea, chest pain, SOB, fevers, etc.

## 2022-06-08 NOTE — ED PROVIDER NOTE - NS ED ROS FT
CONST: no fevers, no chills  EYES: no pain, no vision changes  ENT: no sore throat, no ear pain, no change in hearing  CV: no chest pain, no leg swelling  RESP: no shortness of breath, no cough  ABD: no abdominal pain. +nausea  : no dysuria, no flank pain, no hematuria. +pelvic pain, vaginal bleeding  MSK: no back pain, no extremity pain  NEURO: no headache or additional neurologic complaints  SKIN:  no rash

## 2022-06-08 NOTE — ED ADULT NURSE NOTE - NSIMPLEMENTINTERV_GEN_ALL_ED
Implemented All Universal Safety Interventions:  [place] to call system. Call bell, personal items and telephone within reach. Instruct patient to call for assistance. Room bathroom lighting operational. Non-slip footwear when patient is off stretcher. Physically safe environment: no spills, clutter or unnecessary equipment. Stretcher in lowest position, wheels locked, appropriate side rails in place.

## 2022-06-08 NOTE — ED PROVIDER NOTE - PROGRESS NOTE DETAILS
Reassessed and found with improvement of pain. UA reflects a UTI at the moment. Will start empirically treating.

## 2022-06-08 NOTE — ED PROVIDER NOTE - TOBACCO USE
Post video swallow evaluation today. Per ST : regular diet with thin liquids, Pills in puree, whole is fine.  On aspiration precaution, need to be sitting on the chair every meal.  She is maintaing at 2L via NC, normal O2 saturation.       Unknown if ever smoked

## 2022-06-08 NOTE — ED ADULT NURSE NOTE - OBJECTIVE STATEMENT
Pt awake, alert and oriented x 4 presents for lower abdominal/pelvic pain since yesterday with nausea and vaginal bleeding.    Denies vomiting or diarrhea.   Denies fever.   Denies chest pain or SOB.    Unsure of LMP.    IV attempted x 2; awaiting charge RN attempt.   Awaiting US.

## 2022-06-08 NOTE — ED PROVIDER NOTE - PATIENT PORTAL LINK FT
You can access the FollowMyHealth Patient Portal offered by [facility] by registering at the following website: [email or web address]. By joining [facility]’s FollowMyHealth portal, you will also be able to view your health information using other applications (apps) compatible with our system.

## 2022-06-08 NOTE — ED PROVIDER NOTE - NSFOLLOWUPINSTRUCTIONS_ED_ALL_ED_FT
1. Please find your results attached and share with your doctors. At this time you were found to have a urine infection, please finish your antibiotic course.     2. Continue your at home medication.     3. Return to the ED for persistent or worsening pain & fevers despite antibiotics at home.     4. May take tylenol and motrin for pain.

## 2022-07-27 NOTE — ED CDU PROVIDER INITIAL DAY NOTE - CROS ED GU ALL NEG
ESTABLISHED PATIENT PRE-VISIT PLANNING     Patient was NOT contacted to complete PVP.     Note: Patient will not be contacted if there is no indication to call.     1.  Reviewed notes from the last few office visits within the medical group: Yes    2.  If any orders were placed at last visit or intended to be done for this visit (i.e. 6 mos follow-up), do we have Results/Consult Notes?         •  Labs - Labs were not ordered at last office visit.   Last office visit with PCP Provider MASSIMO Bautista was on 08/09/2021  Note: If patient appointment is for lab review and patient did not complete labs, check with provider if OK to reschedule patient until labs completed.       •  Imaging - Imaging was not ordered at last office visit.          •  Referrals - Referral ordered, patient was seen and consult notes are in chart. Care Teams updated  YES.      -Endocrinology: Please reference office visit Progress Notes by Provider LEON Siddiqi, encounter dates: 02/23/2022 & 10/11/2021. Care Teams updated.     3. Is this appointment scheduled as a Hospital Follow-Up? No    4.  Immunizations were updated in Epic using Reconcile Outside Information activity? Yes    5.  Patient is due for the following Health Maintenance Topics:   Health Maintenance Due   Topic Date Due   • ABDOMINAL AORTIC ANEURYSM (AAA) SCREEN  Never done   • RETINAL SCREENING  02/18/2022   • A1C SCREENING  05/23/2022       6.  AHA (Pulse[employer]) form printed for Provider? No, patient does not have any open alerts COMPLIANT    
- - -

## 2022-08-14 ENCOUNTER — EMERGENCY (EMERGENCY)
Facility: HOSPITAL | Age: 37
LOS: 1 days | Discharge: HOME | End: 2022-08-14
Attending: EMERGENCY MEDICINE | Admitting: EMERGENCY MEDICINE

## 2022-08-14 VITALS
SYSTOLIC BLOOD PRESSURE: 128 MMHG | HEART RATE: 85 BPM | DIASTOLIC BLOOD PRESSURE: 70 MMHG | RESPIRATION RATE: 16 BRPM | OXYGEN SATURATION: 100 % | TEMPERATURE: 97 F

## 2022-08-14 LAB
ALBUMIN SERPL ELPH-MCNC: 4.4 G/DL — SIGNIFICANT CHANGE UP (ref 3.3–5)
ALP SERPL-CCNC: 104 U/L — SIGNIFICANT CHANGE UP (ref 40–120)
ALT FLD-CCNC: 20 U/L — SIGNIFICANT CHANGE UP (ref 4–33)
ANION GAP SERPL CALC-SCNC: 15 MMOL/L — HIGH (ref 7–14)
APPEARANCE UR: ABNORMAL
AST SERPL-CCNC: 21 U/L — SIGNIFICANT CHANGE UP (ref 4–32)
BACTERIA # UR AUTO: ABNORMAL
BILIRUB SERPL-MCNC: 0.2 MG/DL — SIGNIFICANT CHANGE UP (ref 0.2–1.2)
BILIRUB UR-MCNC: NEGATIVE — SIGNIFICANT CHANGE UP
BUN SERPL-MCNC: 9 MG/DL — SIGNIFICANT CHANGE UP (ref 7–23)
CALCIUM SERPL-MCNC: 9.1 MG/DL — SIGNIFICANT CHANGE UP (ref 8.4–10.5)
CHLORIDE SERPL-SCNC: 101 MMOL/L — SIGNIFICANT CHANGE UP (ref 98–107)
CO2 SERPL-SCNC: 21 MMOL/L — LOW (ref 22–31)
COLOR SPEC: COLORLESS — SIGNIFICANT CHANGE UP
CREAT SERPL-MCNC: 0.48 MG/DL — LOW (ref 0.5–1.3)
DIFF PNL FLD: ABNORMAL
EGFR: 125 ML/MIN/1.73M2 — SIGNIFICANT CHANGE UP
EPI CELLS # UR: 4 /HPF — SIGNIFICANT CHANGE UP (ref 0–5)
FLUAV AG NPH QL: SIGNIFICANT CHANGE UP
FLUBV AG NPH QL: SIGNIFICANT CHANGE UP
GLUCOSE SERPL-MCNC: 100 MG/DL — HIGH (ref 70–99)
GLUCOSE UR QL: NEGATIVE — SIGNIFICANT CHANGE UP
HCG SERPL-ACNC: <5 MIU/ML — SIGNIFICANT CHANGE UP
HCT VFR BLD CALC: 42.8 % — SIGNIFICANT CHANGE UP (ref 34.5–45)
HGB BLD-MCNC: 12.8 G/DL — SIGNIFICANT CHANGE UP (ref 11.5–15.5)
HYALINE CASTS # UR AUTO: 1 /LPF — SIGNIFICANT CHANGE UP (ref 0–7)
KETONES UR-MCNC: NEGATIVE — SIGNIFICANT CHANGE UP
LEUKOCYTE ESTERASE UR-ACNC: ABNORMAL
MCHC RBC-ENTMCNC: 25.1 PG — LOW (ref 27–34)
MCHC RBC-ENTMCNC: 29.9 GM/DL — LOW (ref 32–36)
MCV RBC AUTO: 84.1 FL — SIGNIFICANT CHANGE UP (ref 80–100)
NITRITE UR-MCNC: NEGATIVE — SIGNIFICANT CHANGE UP
NRBC # BLD: 0 /100 WBCS — SIGNIFICANT CHANGE UP
NRBC # FLD: 0 K/UL — SIGNIFICANT CHANGE UP
PH UR: 7 — SIGNIFICANT CHANGE UP (ref 5–8)
PLATELET # BLD AUTO: 357 K/UL — SIGNIFICANT CHANGE UP (ref 150–400)
POTASSIUM SERPL-MCNC: 4.7 MMOL/L — SIGNIFICANT CHANGE UP (ref 3.5–5.3)
POTASSIUM SERPL-SCNC: 4.7 MMOL/L — SIGNIFICANT CHANGE UP (ref 3.5–5.3)
PROT SERPL-MCNC: 7.7 G/DL — SIGNIFICANT CHANGE UP (ref 6–8.3)
PROT UR-MCNC: ABNORMAL
RBC # BLD: 5.09 M/UL — SIGNIFICANT CHANGE UP (ref 3.8–5.2)
RBC # FLD: 14.3 % — SIGNIFICANT CHANGE UP (ref 10.3–14.5)
RBC CASTS # UR COMP ASSIST: 1 /HPF — SIGNIFICANT CHANGE UP (ref 0–4)
RSV RNA NPH QL NAA+NON-PROBE: SIGNIFICANT CHANGE UP
SARS-COV-2 RNA SPEC QL NAA+PROBE: SIGNIFICANT CHANGE UP
SODIUM SERPL-SCNC: 137 MMOL/L — SIGNIFICANT CHANGE UP (ref 135–145)
SP GR SPEC: 1.01 — LOW (ref 1.01–1.05)
UROBILINOGEN FLD QL: SIGNIFICANT CHANGE UP
WBC # BLD: 10.69 K/UL — HIGH (ref 3.8–10.5)
WBC # FLD AUTO: 10.69 K/UL — HIGH (ref 3.8–10.5)
WBC UR QL: 5 /HPF — SIGNIFICANT CHANGE UP (ref 0–5)

## 2022-08-14 PROCEDURE — 99220: CPT

## 2022-08-14 PROCEDURE — 74176 CT ABD & PELVIS W/O CONTRAST: CPT | Mod: 26,MA

## 2022-08-14 RX ORDER — KETOROLAC TROMETHAMINE 30 MG/ML
15 SYRINGE (ML) INJECTION ONCE
Refills: 0 | Status: DISCONTINUED | OUTPATIENT
Start: 2022-08-14 | End: 2022-08-14

## 2022-08-14 RX ORDER — MORPHINE SULFATE 50 MG/1
4 CAPSULE, EXTENDED RELEASE ORAL ONCE
Refills: 0 | Status: DISCONTINUED | OUTPATIENT
Start: 2022-08-14 | End: 2022-08-14

## 2022-08-14 RX ORDER — CEFTRIAXONE 500 MG/1
1000 INJECTION, POWDER, FOR SOLUTION INTRAMUSCULAR; INTRAVENOUS EVERY 24 HOURS
Refills: 0 | Status: DISCONTINUED | OUTPATIENT
Start: 2022-08-14 | End: 2022-08-17

## 2022-08-14 RX ORDER — CEFTRIAXONE 500 MG/1
1000 INJECTION, POWDER, FOR SOLUTION INTRAMUSCULAR; INTRAVENOUS ONCE
Refills: 0 | Status: COMPLETED | OUTPATIENT
Start: 2022-08-14 | End: 2022-08-14

## 2022-08-14 RX ORDER — SODIUM CHLORIDE 9 MG/ML
1000 INJECTION INTRAMUSCULAR; INTRAVENOUS; SUBCUTANEOUS ONCE
Refills: 0 | Status: COMPLETED | OUTPATIENT
Start: 2022-08-14 | End: 2022-08-14

## 2022-08-14 RX ORDER — SODIUM CHLORIDE 9 MG/ML
1000 INJECTION INTRAMUSCULAR; INTRAVENOUS; SUBCUTANEOUS
Refills: 0 | Status: DISCONTINUED | OUTPATIENT
Start: 2022-08-14 | End: 2022-08-17

## 2022-08-14 RX ORDER — KETOROLAC TROMETHAMINE 30 MG/ML
15 SYRINGE (ML) INJECTION EVERY 6 HOURS
Refills: 0 | Status: COMPLETED | OUTPATIENT
Start: 2022-08-14 | End: 2022-08-19

## 2022-08-14 RX ADMIN — SODIUM CHLORIDE 1000 MILLILITER(S): 9 INJECTION INTRAMUSCULAR; INTRAVENOUS; SUBCUTANEOUS at 10:22

## 2022-08-14 RX ADMIN — Medication 15 MILLIGRAM(S): at 14:50

## 2022-08-14 RX ADMIN — SODIUM CHLORIDE 1000 MILLILITER(S): 9 INJECTION INTRAMUSCULAR; INTRAVENOUS; SUBCUTANEOUS at 15:05

## 2022-08-14 RX ADMIN — CEFTRIAXONE 100 MILLIGRAM(S): 500 INJECTION, POWDER, FOR SOLUTION INTRAMUSCULAR; INTRAVENOUS at 12:04

## 2022-08-14 RX ADMIN — MORPHINE SULFATE 4 MILLIGRAM(S): 50 CAPSULE, EXTENDED RELEASE ORAL at 14:50

## 2022-08-14 RX ADMIN — SODIUM CHLORIDE 125 MILLILITER(S): 9 INJECTION INTRAMUSCULAR; INTRAVENOUS; SUBCUTANEOUS at 20:23

## 2022-08-14 RX ADMIN — MORPHINE SULFATE 4 MILLIGRAM(S): 50 CAPSULE, EXTENDED RELEASE ORAL at 10:21

## 2022-08-14 NOTE — ED ADULT TRIAGE NOTE - CHIEF COMPLAINT QUOTE
pt c/o right flank pain with dysuria and hematuria x 3 days. +fever/chills. states she took 600mg of motrin at 5 am, with minimal pain relief.  hx-kidney stones

## 2022-08-14 NOTE — ED CDU PROVIDER INITIAL DAY NOTE - OBJECTIVE STATEMENT
36 yo F h/o LMP 8/1/22. h/o asthma, nephrolithiasis p/w progressive symptoms of initial dysuria and increase urinary frequency followed by Rt flank pain and now today hematinuria and fever up to 101. This morning patient had taken Ibuprofen ~0430.  She denies n/v, vag d/c.    CDU note: Agree with above. 37 year old female with PMH of Asthma, and Nephrolithiasis presents to the ED complaining of right flank pain. HD stable. Labs and UA reviewed. CT A/P shows " Mild right hydronephrosis and abdominal aorta rectal thickening with adjacent fat stranding, no obstructing stone, probably passed stone or infectious/inflammatory. Additional nonobstructing punctate right renal calculi." Pt is in CDU for IV hydration, pain control and IV abx. 36 yo F h/o LMP 8/1/22. h/o asthma, nephrolithiasis p/w progressive symptoms of initial dysuria and increase urinary frequency followed by Rt flank pain and now today hematinuria and fever up to 101. This morning patient had taken Ibuprofen ~0430.  She denies n/v, vag d/c. Pt transferred to CDU for further pain management, IV Abx and monitoring.    CDU note: Agree with above. 37 year old female with PMH of Asthma, and Nephrolithiasis presents to the ED complaining of right flank pain. HD stable. Labs and UA reviewed. CT A/P shows " Mild right hydronephrosis and abdominal aorta rectal thickening with adjacent fat stranding, no obstructing stone, probably passed stone or infectious/inflammatory. Additional nonobstructing punctate right renal calculi." Pt is in CDU for IV hydration, pain control and IV abx.

## 2022-08-14 NOTE — ED ADULT NURSE NOTE - OBJECTIVE STATEMENT
pt alert,oriented x3. reports r flank pain with pain upon urination x 3 days. reportschills,fever. iv access,labs sent. pt medicated as ordered. will continue to monitor

## 2022-08-14 NOTE — ED CDU PROVIDER INITIAL DAY NOTE - NSICDXFAMILYHX_GEN_ALL_CORE_FT
FAMILY HISTORY:  Father  Still living? Unknown  Family history of kidney stone, Age at diagnosis: Age Unknown    Grandparent  Still living? Unknown  Family history of pacemaker, Age at diagnosis: Age Unknown

## 2022-08-14 NOTE — ED PROVIDER NOTE - OBJECTIVE STATEMENT
Attending/Luba: 38 yo F h/o shtma, nephrolithiasis p/w progressive symptoms of initial dysuria and increase urinary frequency followed by Rt flank pain and now today hematinuria and fever up to 101. This morning patient had taken Ibuprofen ~0430.  She denies n/v, vag d/c. Attending/Luba: 38 yo F h/o LMP 8/1/22. h/o asthma, nephrolithiasis p/w progressive symptoms of initial dysuria and increase urinary frequency followed by Rt flank pain and now today hematinuria and fever up to 101. This morning patient had taken Ibuprofen ~0430.  She denies n/v, vag d/c.

## 2022-08-14 NOTE — ED CDU PROVIDER INITIAL DAY NOTE - MEDICAL DECISION MAKING DETAILS
37 year old female with PMH of Asthma, and Nephrolithiasis presents to the ED complaining of right flank pain. HD stable. Labs and UA reviewed. CT A/P shows " Mild right hydronephrosis and abdominal aorta rectal thickening with adjacent fat stranding, no obstructing stone, probably passed stone or infectious/inflammatory. Additional nonobstructing punctate right renal calculi." Pt is in CDU for IV hydration, pain control and IV abx.

## 2022-08-14 NOTE — ED PROVIDER NOTE - CLINICAL SUMMARY MEDICAL DECISION MAKING FREE TEXT BOX
A/P 38 yo F p/w urinary symptoms, Rt flank, pain r/o nephrolithiasis  -labs, cultures, IV Abx, analgesia, CT

## 2022-08-14 NOTE — ED PROVIDER NOTE - PROGRESS NOTE DETAILS
Luba: CT results pending. Pt requesting additional analgesia. Luba: CT results pending. Contacted radiology for reading. Luba: Discuss test results with patient. To be transferred to CDU for further IV antibiotic, analgesia and IVF.

## 2022-08-14 NOTE — ED PROVIDER NOTE - PHYSICAL EXAMINATION
Attending/Luba: Moderate-severe pain, PERRL/EOMI, supple, RRR, CTAB; Abd-soft, NT/ND; +Rt CVAT; no LE  edema, +2 DP/PT; A&Ox3, nonfocal

## 2022-08-15 VITALS
DIASTOLIC BLOOD PRESSURE: 85 MMHG | RESPIRATION RATE: 19 BRPM | SYSTOLIC BLOOD PRESSURE: 108 MMHG | HEART RATE: 78 BPM | OXYGEN SATURATION: 99 % | TEMPERATURE: 98 F

## 2022-08-15 LAB
CULTURE RESULTS: SIGNIFICANT CHANGE UP
SPECIMEN SOURCE: SIGNIFICANT CHANGE UP

## 2022-08-15 PROCEDURE — 99217: CPT

## 2022-08-15 RX ORDER — CEFPODOXIME PROXETIL 100 MG
1 TABLET ORAL
Qty: 26 | Refills: 0
Start: 2022-08-15 | End: 2022-08-27

## 2022-08-15 RX ADMIN — SODIUM CHLORIDE 125 MILLILITER(S): 9 INJECTION INTRAMUSCULAR; INTRAVENOUS; SUBCUTANEOUS at 04:19

## 2022-08-15 RX ADMIN — Medication 15 MILLIGRAM(S): at 05:58

## 2022-08-15 RX ADMIN — Medication 15 MILLIGRAM(S): at 00:07

## 2022-08-15 NOTE — ED CDU PROVIDER DISPOSITION NOTE - NSFOLLOWUPINSTRUCTIONS_ED_ALL_ED_FT
You were seen in our Department in for Flank pain.  Start taking Vantin 200mg BID for 13 days.  Follow up with your primary care doctor in 1-2 days for further monitoring.  Follow up with urology within 1 week for further evaluation.  If you develop fevers, vomiting, worsening flank pain, dizziness, bloody urine, or any worsening symptoms return to our ED for evaluation.

## 2022-08-15 NOTE — ED CDU PROVIDER DISPOSITION NOTE - NSFOLLOWUPCLINICS_GEN_ALL_ED_FT
[facility]  Urology  [address] [place] [address] - [address] [place] [address]  Phone: [phone]  Fax:   Follow Up Time: 7-10 Days

## 2022-08-15 NOTE — ED CDU PROVIDER SUBSEQUENT DAY NOTE - ATTENDING APP SHARED VISIT CONTRIBUTION OF CARE
Dr. Graham:  I performed a face to face bedside interview with patient regarding history of present illness, review of symptoms and past medical history. I completed an independent physical exam.  I have discussed patient's plan of care with PA.   I agree with note as stated above, having amended the EMR as needed to reflect my findings.   This includes HISTORY OF PRESENT ILLNESS, HIV, PAST MEDICAL/SURGICAL/FAMILY/SOCIAL HISTORY, ALLERGIES AND HOME MEDICATIONS, REVIEW OF SYSTEMS, PHYSICAL EXAM, and any PROGRESS NOTES during the time I functioned as the attending physician for this patient.    37F h/o asthma, nephrolithiasis, presented to ED with R flank pain and fever.  CT showed " Mild right hydronephrosis and abdominal aorta rectal thickening with adjacent fat stranding, no obstructing stone, probably passed stone or infectious/inflammatory. Additional nonobstructing punctate right renal calculi."  Sent to CDU for clinical pyelonephritis.  Feels improved this morning.    exam:   -nad  - rrr   -ctab   -abd soft ntnd, mild R CVAT    A/P  - presumed pyelonephritis  - plan for dc home with abx

## 2022-08-15 NOTE — ED CDU PROVIDER SUBSEQUENT DAY NOTE - HISTORY
37 year old female with PMH of Asthma, and Nephrolithiasis presents to the ED complaining of right flank pain. HD stable. Labs and UA reviewed. CT A/P shows " Mild right hydronephrosis and abdominal aorta rectal thickening with adjacent fat stranding, no obstructing stone, probably passed stone or infectious/inflammatory. Additional nonobstructing punctate right renal calculi." Pt is in CDU for IV hydration, pain control and IV abx.  No complaints overnight. Well appearing. Pain controlled.

## 2022-08-15 NOTE — ED CDU PROVIDER DISPOSITION NOTE - ATTENDING CONTRIBUTION TO CARE
Dr. Graham:  I performed a face to face bedside interview with patient regarding history of present illness, review of symptoms and past medical history. I completed an independent physical exam.  I have discussed patient's plan of care with PA.   I agree with note as stated above, having amended the EMR as needed to reflect my findings.   This includes HISTORY OF PRESENT ILLNESS, HIV, PAST MEDICAL/SURGICAL/FAMILY/SOCIAL HISTORY, ALLERGIES AND HOME MEDICATIONS, REVIEW OF SYSTEMS, PHYSICAL EXAM, and any PROGRESS NOTES during the time I functioned as the attending physician for this patient.

## 2022-08-15 NOTE — ED CDU PROVIDER DISPOSITION NOTE - CLINICAL COURSE
37yoF with PMH of renal colic, asthma, p/w R flank pain and dysuria/frequency with urination over past 3 days. subjective fevers max T 101.5F at home. no n/v/d/c.   while in ED, labs revealing no leukocytosis, UA revealing moderate leuks, CT A/P c/w mild R hydro, and stranding c/w passed stone vs infectious etiology, as well as nonobstructing punctate renal stones. pt was transferred to CDU for IV rocephin, fluids, and pain control as needed.   upon reassessment, pt feeling well, tolerating PO, VSS, afebrile. rx sent for vantin 200mg bid to appropriate pharmacy. return precautions discussed. pt verbalized understanding and is in agreement with plan. will f/u with her PCP and given outpatient urology follow up.

## 2022-08-15 NOTE — ED CDU PROVIDER DISPOSITION NOTE - NS ED ATTENDING STATEMENT MOD
[facility]     Chief Complaint Chief Complaint   Patient presents with   • Office Visit     Bilateral foot pain       Date 08/05/22       ASSESSMENT:  1. Pain in both feet    2. Metatarsalgia of both feet        PLAN:  · Patient was seen and evaluated today.   · Patient's condition was discussed in great detail with the patient today.   · Xrays were taken today.  These were personally interpreted by myself, discussed reviewed with patient.  · She does not have any significant arthritis nor any significant deformities.  · I do believe that most for issues is by mechanical in shoe gear related.  I did recommend some proper shoes and inserts.  · We discussed using Voltaren gel 1% gel to apply 3-4 times a day with 5 minutes massage. You can also purchase the generic form called Diclofenac 1% gel.  · Follow-up as needed.  · Patient understands and is in agreement with the treatment plan. All questions were answered to the patients level of satisfaction.     No orders of the defined types were placed in this encounter.      I did advise the patient to Return if symptoms worsen or fail to improve.    Thank you for allowing me to participate in your foot and ankle needs. If you have any questions please feel free to contact my office [phone] or message me on trivago.     Caleb Moralez DPM    CC:   Chief Complaint   Patient presents with   • Office Visit     Bilateral foot pain        HISTORY OF PRESENT ILLNESS:  Ms. Callahan is a pleasant 66 year old female who presents to the clinic today with concerns of pain in both feet. Patient states this has been going on for achy pain to the bottoms of her feet.  Patient states that it bothers her when she is walking.  [place] pain was a 9/10.  She denies any trauma.  She denies any other complaints at this time. Patient currently denies any nausea, vomiting, fever, chills or shortness of breath.    ALLERGIES:    Allergen Reactions   • Lyrica [Pregabalin] Tremors   • Savella HIVES and PRURITUS       Past Medical History:   Diagnosis Date   • Anxiety    • Arthritis     Generalized   • Cervical cancer (CMS/[facility])     stage 1B1 squamous cell cervical cancer   • Depression    • Essential (primary) hypertension    • Falls     Per patient, \"falls a lot\"   • Headache(784.0)     Chronic   • Lung nodule    • Malignant neoplasm (CMS/HCC)     cervical   • Other and unspecified hyperlipidemia 2014   • Perinephric cyst 2015   • Torn rotator cuff 2010    left       Family History   Problem Relation Age of Onset   • Heart disease Father    • Cancer Sister 64        Breast   • Stroke/TIA Sister    • Dementia/Alzheimers Sister    • Other Mother         choked from food   • Stroke/TIA Son        Social History     Socioeconomic History   • Marital status: Single     Spouse name: Not on file   • Number of children: Not on file   • Years of education: Not on file   • Highest education level: Not on file   Occupational History   • Occupation: disabled   Tobacco Use   • Smoking status: Former Smoker     Types: Cigarettes     Quit date: 1980     Years since quittin.6   • Smokeless tobacco: Never Used   Vaping Use   • Vaping Use: never used   Substance and Sexual Activity   • Alcohol use: No     Alcohol/week: 0.0 standard drinks     Comment: quit in the 80s   • Drug use: No   • Sexual activity: Not Currently     Partners: Male   Other Topics Concern   •  Service Not Asked   • Blood Transfusions Not Asked   • Caffeine Concern Not Asked   • Occupational Exposure Not Asked   • Hobby Hazards Not Asked   • Sleep Concern Not Asked   • Stress Concern Not Asked   • Weight Concern Not Asked   • Special Diet Not Asked   • Back Care Not Asked   • Exercise Not Asked   • Bike Helmet Not Asked   • Seat Belt Yes   • Self-Exams Not Asked   Social History Narrative    Lives with son and daughter     Social Determinants of Health      Financial Resource Strain: Not on file   Food Insecurity: Not on file   Transportation Needs: Not on file   Physical Activity: Not on file   Stress: Not on file   Social Connections: Not on file   Intimate Partner Violence: Not At Risk   • Social Determinants: Intimate Partner Violence Past Fear: No   • Social Determinants: Intimate Partner Violence Current Fear: No       Review of Systems:  Constitutional/Psyiactric: Negative for fever, chills, nausea, vomiting or unexpected weight loss  Musculoskeletal: No new muscle joint ligament or bone aches, pains, limitations or abnormalities      PHYSICAL EXAMINATION:  Visit Vitals  Ht 5' 4\" (1.626 m)   Wt 87.9 kg (193 lb 12.6 oz)   LMP  (LMP Unknown)   BMI 33.26 kg/m²     General:  Well-groomed, well-dressed, no acute distress, alert and orientated x 3, mood and affect are normal   Integument:  Skin is intact, warm, dry and supple bilaterally. No open wounds.  Vascular: Dorsalis pedis 2/4 bilateral,  Posterior tibial pulses are 2/4 bilaterally, Capillary Refill time is < 3 seconds to all digits  Neurologic:  Gross sensation is intact bilaterally, Epicritic sensation is intact bilaterally, Achilles tendon reflex intact bilaterally  Musculoskeletal: Manual Muscle Test is 5/5 in all 4 quadrants bilateral, ROM of ankle joint is fluid bilateral, DF is limited with knee extended bilateral, DF is limited with knee flexed bilateral , subtalar joint inversion and eversion is fluid with no pain, limitation ROM or crepitus bilateral, no pain with palpation to any specific joint or bone bilateral feet, weight-bearing exam does show a completely rectus foot without any significant flattening there is some pain over the metatarsal heads bilateral        DIAGNOSTIC STUDIES:  Six views left and right foot standing x-ray 08/05/2022  Overall good joint alignment calcaneocuboid angle straight and aligned, there is no significant talar head uncoverage there is no decreasing calcaneal  inclination angle there is no significant increase or decrease in kites angle or Meary's angle    Attending with

## 2022-08-15 NOTE — ED CDU PROVIDER DISPOSITION NOTE - PATIENT PORTAL LINK FT
You can access the FollowMyHealth Patient Portal offered by [facility] by registering at the following website: [email or web address]. By joining [facility]’s FollowMyHealth portal, you will also be able to view your health information using other applications (apps) compatible with our system.

## 2022-08-15 NOTE — ED CDU PROVIDER SUBSEQUENT DAY NOTE - PROGRESS NOTE DETAILS
37yoF PMH asthma, nephrolithiasis p/w R flank pain and irritative voiding symptoms x 3 days. fevers 101F at home.     labs revealing no significant leukocytosis  UA moderate leuks  Ct revealing mild R hydro w/ concern for possible passed stone vs infectious etiology.  pt feeling well, afebrile, tolerating rocephin well.  no CVAT, VSS. afebrile.     will d/c with abx and outpatient urology eval.    Patient reassessed, feeling well, eager for discharge.   Patient was updated on all results/findings, and advised to follow up with their PMD in 48-72 hours as well as urology.  Return precautions discussed.   Patient verbalized understanding and is in agreement with plan.

## 2022-08-19 LAB
CULTURE RESULTS: SIGNIFICANT CHANGE UP
CULTURE RESULTS: SIGNIFICANT CHANGE UP
SPECIMEN SOURCE: SIGNIFICANT CHANGE UP
SPECIMEN SOURCE: SIGNIFICANT CHANGE UP

## 2022-09-28 NOTE — ED CDU PROVIDER SUBSEQUENT DAY NOTE - NSICDXPASTSURGICALHX_GEN_ALL_CORE_FT
PAST SURGICAL HISTORY:  No significant past surgical history      [Follow Up] : a follow up visit [Parents] : parents [Patient] : patient [FreeTextEntry1] : Right distal radius and ulna fractures. Date of injury: 7/20/2022

## 2022-10-05 NOTE — ED ADULT NURSE NOTE - NS ED NURSE DISCH DISPOSITION
Topical Ketoconazole Pregnancy And Lactation Text: This medication is Pregnancy Category B and is considered safe during pregnancy. It is unknown if it is excreted in breast milk. Discharged

## 2022-10-31 NOTE — ED PROVIDER NOTE - PHYSICAL EXAMINATION
Patient paid 25.00 for fmla forms.  Along with her copay of 55.00 GENERAL: Awake, alert, uncomfortable in stretcher 2/2 pain  HEENT: NC/AT, moist mucous membranes   LUNGS: CTAB, no wheezes or crackles   CARDIAC: RRR, no m/r/g  ABDOMEN: Soft, LLQ tenderness to palpation  : no lesions on inspection. Found with blood in vaginal vault w minimal pooling and no clots. difficult to visualize cervix. cervix os open on exam. L adnexal tenderness  BACK: no CVA tenderness  EXT: No edema, no calf tenderness  NEURO: A&Ox3. Moving all extremities.

## 2022-11-21 ENCOUNTER — EMERGENCY (EMERGENCY)
Facility: HOSPITAL | Age: 37
LOS: 1 days | Discharge: HOME | End: 2022-11-21
Attending: EMERGENCY MEDICINE | Admitting: EMERGENCY MEDICINE

## 2022-11-21 VITALS
DIASTOLIC BLOOD PRESSURE: 80 MMHG | HEART RATE: 95 BPM | SYSTOLIC BLOOD PRESSURE: 120 MMHG | RESPIRATION RATE: 18 BRPM | TEMPERATURE: 98 F | OXYGEN SATURATION: 100 %

## 2022-11-21 VITALS
TEMPERATURE: 98 F | RESPIRATION RATE: 18 BRPM | DIASTOLIC BLOOD PRESSURE: 70 MMHG | OXYGEN SATURATION: 100 % | SYSTOLIC BLOOD PRESSURE: 120 MMHG | HEART RATE: 88 BPM

## 2022-11-21 PROBLEM — J45.909 UNSPECIFIED ASTHMA, UNCOMPLICATED: Chronic | Status: ACTIVE | Noted: 2022-08-14

## 2022-11-21 PROBLEM — N20.0 CALCULUS OF KIDNEY: Chronic | Status: ACTIVE | Noted: 2022-08-14

## 2022-11-21 LAB
ALBUMIN SERPL ELPH-MCNC: 4.2 G/DL — SIGNIFICANT CHANGE UP (ref 3.3–5)
ALP SERPL-CCNC: 90 U/L — SIGNIFICANT CHANGE UP (ref 40–120)
ALT FLD-CCNC: 20 U/L — SIGNIFICANT CHANGE UP (ref 4–33)
ANION GAP SERPL CALC-SCNC: 11 MMOL/L — SIGNIFICANT CHANGE UP (ref 7–14)
APPEARANCE UR: CLEAR — SIGNIFICANT CHANGE UP
AST SERPL-CCNC: 16 U/L — SIGNIFICANT CHANGE UP (ref 4–32)
BACTERIA # UR AUTO: ABNORMAL
BASOPHILS # BLD AUTO: 0.06 K/UL — SIGNIFICANT CHANGE UP (ref 0–0.2)
BASOPHILS NFR BLD AUTO: 0.6 % — SIGNIFICANT CHANGE UP (ref 0–2)
BILIRUB SERPL-MCNC: <0.2 MG/DL — SIGNIFICANT CHANGE UP (ref 0.2–1.2)
BILIRUB UR-MCNC: NEGATIVE — SIGNIFICANT CHANGE UP
BUN SERPL-MCNC: 10 MG/DL — SIGNIFICANT CHANGE UP (ref 7–23)
CALCIUM SERPL-MCNC: 9.2 MG/DL — SIGNIFICANT CHANGE UP (ref 8.4–10.5)
CHLORIDE SERPL-SCNC: 104 MMOL/L — SIGNIFICANT CHANGE UP (ref 98–107)
CO2 SERPL-SCNC: 21 MMOL/L — LOW (ref 22–31)
COLOR SPEC: SIGNIFICANT CHANGE UP
CREAT SERPL-MCNC: 0.47 MG/DL — LOW (ref 0.5–1.3)
DIFF PNL FLD: NEGATIVE — SIGNIFICANT CHANGE UP
EGFR: 126 ML/MIN/1.73M2 — SIGNIFICANT CHANGE UP
EOSINOPHIL # BLD AUTO: 0.12 K/UL — SIGNIFICANT CHANGE UP (ref 0–0.5)
EOSINOPHIL NFR BLD AUTO: 1.2 % — SIGNIFICANT CHANGE UP (ref 0–6)
EPI CELLS # UR: 7 /HPF — HIGH (ref 0–5)
GLUCOSE SERPL-MCNC: 90 MG/DL — SIGNIFICANT CHANGE UP (ref 70–99)
GLUCOSE UR QL: NEGATIVE — SIGNIFICANT CHANGE UP
HCG SERPL-ACNC: 171.3 MIU/ML — SIGNIFICANT CHANGE UP
HCT VFR BLD CALC: 40.5 % — SIGNIFICANT CHANGE UP (ref 34.5–45)
HGB BLD-MCNC: 13 G/DL — SIGNIFICANT CHANGE UP (ref 11.5–15.5)
HYALINE CASTS # UR AUTO: 2 /LPF — SIGNIFICANT CHANGE UP (ref 0–7)
IANC: 6.77 K/UL — SIGNIFICANT CHANGE UP (ref 1.8–7.4)
IMM GRANULOCYTES NFR BLD AUTO: 0.4 % — SIGNIFICANT CHANGE UP (ref 0–0.9)
KETONES UR-MCNC: NEGATIVE — SIGNIFICANT CHANGE UP
LEUKOCYTE ESTERASE UR-ACNC: ABNORMAL
LYMPHOCYTES # BLD AUTO: 2.6 K/UL — SIGNIFICANT CHANGE UP (ref 1–3.3)
LYMPHOCYTES # BLD AUTO: 25.3 % — SIGNIFICANT CHANGE UP (ref 13–44)
MCHC RBC-ENTMCNC: 25.8 PG — LOW (ref 27–34)
MCHC RBC-ENTMCNC: 32.1 GM/DL — SIGNIFICANT CHANGE UP (ref 32–36)
MCV RBC AUTO: 80.5 FL — SIGNIFICANT CHANGE UP (ref 80–100)
MONOCYTES # BLD AUTO: 0.67 K/UL — SIGNIFICANT CHANGE UP (ref 0–0.9)
MONOCYTES NFR BLD AUTO: 6.5 % — SIGNIFICANT CHANGE UP (ref 2–14)
NEUTROPHILS # BLD AUTO: 6.77 K/UL — SIGNIFICANT CHANGE UP (ref 1.8–7.4)
NEUTROPHILS NFR BLD AUTO: 66 % — SIGNIFICANT CHANGE UP (ref 43–77)
NITRITE UR-MCNC: NEGATIVE — SIGNIFICANT CHANGE UP
NRBC # BLD: 0 /100 WBCS — SIGNIFICANT CHANGE UP (ref 0–0)
NRBC # FLD: 0 K/UL — SIGNIFICANT CHANGE UP (ref 0–0)
PH UR: 6 — SIGNIFICANT CHANGE UP (ref 5–8)
PLATELET # BLD AUTO: 410 K/UL — HIGH (ref 150–400)
POTASSIUM SERPL-MCNC: 4.5 MMOL/L — SIGNIFICANT CHANGE UP (ref 3.5–5.3)
POTASSIUM SERPL-SCNC: 4.5 MMOL/L — SIGNIFICANT CHANGE UP (ref 3.5–5.3)
PROT SERPL-MCNC: 7.8 G/DL — SIGNIFICANT CHANGE UP (ref 6–8.3)
PROT UR-MCNC: NEGATIVE — SIGNIFICANT CHANGE UP
RBC # BLD: 5.03 M/UL — SIGNIFICANT CHANGE UP (ref 3.8–5.2)
RBC # FLD: 14.4 % — SIGNIFICANT CHANGE UP (ref 10.3–14.5)
RBC CASTS # UR COMP ASSIST: 3 /HPF — SIGNIFICANT CHANGE UP (ref 0–4)
SODIUM SERPL-SCNC: 136 MMOL/L — SIGNIFICANT CHANGE UP (ref 135–145)
SP GR SPEC: 1.01 — SIGNIFICANT CHANGE UP (ref 1.01–1.05)
UROBILINOGEN FLD QL: SIGNIFICANT CHANGE UP
WBC # BLD: 10.26 K/UL — SIGNIFICANT CHANGE UP (ref 3.8–10.5)
WBC # FLD AUTO: 10.26 K/UL — SIGNIFICANT CHANGE UP (ref 3.8–10.5)
WBC UR QL: 7 /HPF — HIGH (ref 0–5)

## 2022-11-21 PROCEDURE — 76830 TRANSVAGINAL US NON-OB: CPT | Mod: 26

## 2022-11-21 PROCEDURE — 99285 EMERGENCY DEPT VISIT HI MDM: CPT

## 2022-11-21 RX ORDER — SODIUM CHLORIDE 9 MG/ML
1000 INJECTION INTRAMUSCULAR; INTRAVENOUS; SUBCUTANEOUS ONCE
Refills: 0 | Status: COMPLETED | OUTPATIENT
Start: 2022-11-21 | End: 2022-11-21

## 2022-11-21 RX ORDER — NITROFURANTOIN MACROCRYSTAL 50 MG
1 CAPSULE ORAL
Qty: 10 | Refills: 0
Start: 2022-11-21 | End: 2022-11-25

## 2022-11-21 RX ADMIN — SODIUM CHLORIDE 1000 MILLILITER(S): 9 INJECTION INTRAMUSCULAR; INTRAVENOUS; SUBCUTANEOUS at 15:29

## 2022-11-21 NOTE — ED ADULT NURSE NOTE - NSIMPLEMENTINTERV_GEN_ALL_ED
Implemented All Universal Safety Interventions:  [place] to call system. Call bell, personal items and telephone within reach. Instruct patient to call for assistance. Room bathroom lighting operational. Non-slip footwear when patient is off stretcher. Physically safe environment: no spills, clutter or unnecessary equipment. Stretcher in lowest position, wheels locked, appropriate side rails in place.

## 2022-11-21 NOTE — ED PROVIDER NOTE - ATTENDING APP SHARED VISIT CONTRIBUTION OF CARE
GEN - NAD; well appearing; A+O x3   HEAD - NC/AT   EYES- PERRL, EOMI  ENT: Airway patent, mmm, Oral cavity and pharynx normal. No inflammation, swelling, exudate, or lesions.  NECK: Neck supple  PULMONARY - CTA b/l, symmetric breath sounds.   CARDIAC -s1s2, RRR, no M,G,R  ABDOMEN - +BS, ND, NT, soft, no guarding, no rebound, no masses   BACK - no CVA tenderness, Normal  spine   EXTREMITIES - FROM, symmetric pulses, capillary refill < 2 seconds, no edema   SKIN - no rash or bruising   NEUROLOGIC - alert, speech clear, no focal deficits  PSYCH -nl mood/affect, nl insight.  37-year-old female presents to the ED with episode of syncope.  Patient reports that she was standing up she began to feel lightheaded, then lost consciousness, lasted a few seconds, was not injured and did not hit her head, event was witnessed.  Has been feeling intermittently lightheaded over the last few weeks associated with nausea x1 day and breast tenderness x1 week.  She also noticed occasional intermittent mild lower abdominal cramping no abdominal pain at this time or earlier today.  She is sexually active, does not use any form of birth control, LMP October 14, no fevers chills headache vision changes neck pain stiffness chest pain shortness of breath cough lower extremity edema.  On exam patient is very well-appearing unlabored breathing clear lungs abdomen soft nontender no traumatic injuries noted, vital signs noted, plan for labs UA UCG EKG. evaluate for differential including but not limited to anemia electrolyte disturbance organ dysfunction infection pregnancy hydrate reassess

## 2022-11-21 NOTE — ED PROVIDER NOTE - OBJECTIVE STATEMENT
36 yo sexually active F  with a PMH of two spontaneous abortions, one late term (6m) who presents to the [facility] ED post a witnessed syncopal event at her workplace. Pt was standing, began to feel dizzy and passed out (unknown duration; seconds-a minute). pt denies head injury.  Pt admits intermittent dizziness x 2 weeks, nausea x 1 day, generalized weakness post syncopal event. Pt admits breast tenderness x one week, extremely intermittent/mild llq cramping, one episode of vomiting post-dinner last night. Pt is sexually active with one partner, doesn’t use birth control or condoms, LMP 10, urine hcg + in ED.  One previous ED visit for heavy menstrual bleeding, course unknown.

## 2022-11-21 NOTE — ED PROVIDER NOTE - NSFOLLOWUPINSTRUCTIONS_ED_ALL_ED_FT
Follow up with [facility] (private Ob/Gyn) in 1 week for repeat bHCG and TVUS  Return to Emergency Room with any worsening symptoms or no improvements.

## 2022-11-21 NOTE — CONSULT NOTE ADULT - ASSESSMENT
A/P:    DEBORAH Howell, PGY2 A/P: A/P   37y  G 3  P 0020  presents without GYN complaints, incidentally found to have a b-HCG of 171.  Differenital includes ectopic pregnancy vs. viable IUP.  Difficult to assess at this time.      HCG Trend: HCG Quantitative, Serum: 171.3 (11-21-22 @ 15:49)     - Patient should follow up with [facility] (private Ob/Gyn) in 1 week for repeat bHCG and TVUS   - Patient not having any bleeding, no need for rhogam at this time.   -Ectopic precautions reviewed with patient.  Discussed with patient importance of follow up for b-HCG given unknown pregnancy location at this time.  Patient expressed understanding.  All questions and concerns addressed to patient's apparent satisfaction.   -patient to be added to GYN b-HCG list for closer follow up.    -patient stable for d/c home from GYN perspective.  Primary management per ED team.      D/w Dr. Ayaka Howell, PGY2

## 2022-11-21 NOTE — ED PROVIDER NOTE - PROGRESS NOTE DETAILS
Patient's ECG found to be positive results discussed with her ultrasound ordered to assess pregnancy status,  patient with no abdominal pain no bleeding no discharge. spoke to OB/GYN team (Dr. Howell)  and advised pt to follow up in one week for repeat blood work in [facility]. Patient's uCG found to be positive results discussed with her ultrasound ordered to assess pregnancy status,  patient with no abdominal pain no bleeding no discharge. spoke to OB/GYN team (Dr. Howell)  and advised pt to follow up in one week for repeat blood work in [facility]. All results d/w patient and copies given with instructions to bring with them to their follow up appointment.  The patient was given verbal and written discharge instructions Specifically, instructions when to return to the ED and to seek follow-up from their ob. The patient understands that should their symptoms worsen or any new symptoms arise, they should return to the ED immediately for further evaluation.  Vss, NAD, tolerating po and ambulating steadily at discharge.

## 2022-11-21 NOTE — CONSULT NOTE ADULT - SUBJECTIVE AND OBJECTIVE BOX
Gyn Consult Note  MICHELL YEPEZ  37y  Female [id number]    HPI:      Name of GYN Physician:     OBHx:    GYNHx: Denies fibroids, cysts, endometriosis, STI's, Abnormal pap smears   PMH:  PSH:  Meds:  All:  Soc:    accepts blood    Physical Exam:   General: sitting comfortably in bed, NAD   CV: RRR  Lungs: CTAB  Back: No CVA tenderness  Abd: Soft, non-tender, non-distended.  Bowel sounds present.    :  No bleeding on pad.    External labia wnl.  Bimanual exam with no cervical motion tenderness, uterus wnl, adnexa non palpable b/l.  Cervix closed vs. Cervix dilated    cm   Speculum Exam: No active bleeding from os.  Physiologic discharge.    Ext: non-tender b/l, no edema     LABS:                              13.0   10.26 )-----------( 410      ( 2022 15:49 )             40.5         136  |  104  |  10  ----------------------------<  90  4.5   |  21<L>  |  0.47<L>    Ca    9.2      2022 15:49    TPro  7.8  /  Alb  4.2  /  TBili  <0.2  /  DBili  x   /  AST  16  /  ALT  20  /  AlkPhos  90      I&O's Detail      Urinalysis Basic - ( 2022 15:49 )    Color: Light Yellow / Appearance: Clear / S.012 / pH: x  Gluc: x / Ketone: Negative  / Bili: Negative / Urobili: <2 mg/dL   Blood: x / Protein: Negative / Nitrite: Negative   Leuk Esterase: Moderate / RBC: 3 /HPF / WBC 7 /HPF   Sq Epi: x / Non Sq Epi: 7 /HPF / Bacteria: Few      HCG Quantitative, Serum: 171.3    RADIOLOGY & ADDITIONAL STUDIES:    < from: US Transvaginal (22 @ 16:59) >  US TRANSVAGINAL                          PROCEDURE DATE:  2022          INTERPRETATION:  CLINICAL INFORMATION: Abdominal cramping. 2022   beta-hCG 171.    LMP: 10/14/2022    COMPARISON: Pelvic ultrasound 2022.    TECHNIQUE:  Endovaginal pelvic sonogram only.    FINDINGS:  Uterus and endometrium: Nabothian cysts noted. No intrauterine   gestational sac definitively visualized; there is ill-defined fluid   within the right endometrial horn. The endometrium is thickened,   measuring up to 15 mm.    Right ovary: 4.6 cm x 2.8 cm x 2.9 cm. Within normal limits and contains   a corpus luteal cyst measuring 2.3 x 2.4 x 2.1 cm. Normal arterial and   venous waveforms.  Left ovary: 3.7 cm x 2.6 cm x 2.5 cm. Within normal limits. Normal   arterial and venous waveforms.    Fluid: None.    IMPRESSION:  Pregnancy of unknown location. No adnexal mass or gestational sac   definitively visualized; ill-defined fluid within the right endometrial   horn. Thickened endometrial complex up to 15 mm.    --- End of Report ---          VINCENT GRACE MD; Resident Radiologist  This document has been electronically signed.  HERMANN VÁZQUEZ DO; Attending Radiologist  This document has been electronically signed. 2022  5:33PM    < end of copied text >   Gyn Consult Note  MICHELL YEPEZ  37y  Female [id number]    HPI: 36yo  LMP 10/14/22 presenting to ED following a fainting episode at work. She denies continued lightheadedness and dizziness, feeling overall improved. However she was incidentally found to be pregnant with bHCG 171 as below. Denies abdominal and pelvic pain, vaginal bleeding, and nausea and vomiting. She does report some nausea/vomiting yesterday and breast tenderness. This is a highly desired pregnancy.       Name of GYN Physician: [facility]     OBHx: 2x SAB   GYNHx: Denies fibroids, cysts, endometriosis, STI's, Abnormal pap smears   PMH: Denies (via chart review pt has previously endorsed asthma)   PSH: Denies   Meds: Denies   All: NKDA    accepts blood    Physical Exam:   General: sitting comfortably in bed, NAD   Abd: Soft, non-tender, non-distended. No rebound/guarding.   Ext: non-tender b/l, no edema     LABS:                        13.0   10.26 )-----------( 410      ( 2022 15:49 )             40.5     11-    136  |  104  |  10  ----------------------------<  90  4.5   |  21<L>  |  0.47<L>    Ca    9.2      2022 15:49    TPro  7.8  /  Alb  4.2  /  TBili  <0.2  /  DBili  x   /  AST  16  /  ALT  20  /  AlkPhos  90  11-21    I&O's Detail      Urinalysis Basic - ( 2022 15:49 )    Color: Light Yellow / Appearance: Clear / S.012 / pH: x  Gluc: x / Ketone: Negative  / Bili: Negative / Urobili: <2 mg/dL   Blood: x / Protein: Negative / Nitrite: Negative   Leuk Esterase: Moderate / RBC: 3 /HPF / WBC 7 /HPF   Sq Epi: x / Non Sq Epi: 7 /HPF / Bacteria: Few      HCG Quantitative, Serum: 171.3    RADIOLOGY & ADDITIONAL STUDIES:    < from: US Transvaginal (22 @ 16:59) >  US TRANSVAGINAL                          PROCEDURE DATE:  2022          INTERPRETATION:  CLINICAL INFORMATION: Abdominal cramping. 2022   beta-hCG 171.    LMP: 10/14/2022    COMPARISON: Pelvic ultrasound 2022.    TECHNIQUE:  Endovaginal pelvic sonogram only.    FINDINGS:  Uterus and endometrium: Nabothian cysts noted. No intrauterine   gestational sac definitively visualized; there is ill-defined fluid   within the right endometrial horn. The endometrium is thickened,   measuring up to 15 mm.    Right ovary: 4.6 cm x 2.8 cm x 2.9 cm. Within normal limits and contains   a corpus luteal cyst measuring 2.3 x 2.4 x 2.1 cm. Normal arterial and   venous waveforms.  Left ovary: 3.7 cm x 2.6 cm x 2.5 cm. Within normal limits. Normal   arterial and venous waveforms.    Fluid: None.    IMPRESSION:  Pregnancy of unknown location. No adnexal mass or gestational sac   definitively visualized; ill-defined fluid within the right endometrial   horn. Thickened endometrial complex up to 15 mm.    --- End of Report ---          VINCENT GRACE MD; Resident Radiologist  This document has been electronically signed.  HERMANN VÁZQUEZ DO; Attending Radiologist  This document has been electronically signed. 2022  5:33PM    < end of copied text >

## 2022-11-21 NOTE — ED PROVIDER NOTE - CROS ED RESP ALL NEG
Patient notified that her lab results were normal except for the following: All the lab was okay they except she has an elevated triglycerides.  Weight loss and regular exercise would help this.  Looks like she has an upcoming appointment and I can discuss this with her at that time. negative...

## 2022-11-21 NOTE — ED PROVIDER NOTE - CLINICAL SUMMARY MEDICAL DECISION MAKING FREE TEXT BOX
38 yo female with witnessed syncope. pt is found to have a positive HCG.   syncope most likely secondary to preg. EKG with no significant findings.   Will obtain labs, IV hydration, UA/UCx, TVUS

## 2022-11-21 NOTE — ED ADULT NURSE NOTE - OBJECTIVE STATEMENT
Pt rec'd in intake, s/p syncopal episode while at work, denies falling to the ground, denies injury, denies chest pain. Pt was found to have a positive UCG in ED

## 2022-11-21 NOTE — ED PROVIDER NOTE - NS ED ATTENDING STATEMENT MOD
Attending with This was a shared visit with the TRACY. I reviewed and verified the documentation and independently performed the documented:

## 2022-11-21 NOTE — ED PROVIDER NOTE - PATIENT PORTAL LINK FT
You can access the FollowMyHealth Patient Portal offered by [facility] by registering at the following website: [email or web address]. By joining [employer]’s FollowMyHealth portal, you will also be able to view your health information using other applications (apps) compatible with our system.

## 2022-11-23 ENCOUNTER — RESULT REVIEW (OUTPATIENT)
Age: 37
End: 2022-11-23

## 2022-11-23 ENCOUNTER — OUTPATIENT (OUTPATIENT)
Dept: OUTPATIENT SERVICES | Facility: HOSPITAL | Age: 37
LOS: 1 days | End: 2022-11-23

## 2022-11-23 ENCOUNTER — APPOINTMENT (OUTPATIENT)
Dept: OBGYN | Facility: HOSPITAL | Age: 37
End: 2022-11-23

## 2022-11-23 VITALS
DIASTOLIC BLOOD PRESSURE: 70 MMHG | BODY MASS INDEX: 39.75 KG/M2 | HEIGHT: 62 IN | HEART RATE: 96 BPM | TEMPERATURE: 97.5 F | SYSTOLIC BLOOD PRESSURE: 128 MMHG | WEIGHT: 216 LBS

## 2022-11-23 LAB
-  AMIKACIN: SIGNIFICANT CHANGE UP
-  AMOXICILLIN/CLAVULANIC ACID: SIGNIFICANT CHANGE UP
-  AMPICILLIN/SULBACTAM: SIGNIFICANT CHANGE UP
-  AMPICILLIN: SIGNIFICANT CHANGE UP
-  AZTREONAM: SIGNIFICANT CHANGE UP
-  CEFAZOLIN: SIGNIFICANT CHANGE UP
-  CEFEPIME: SIGNIFICANT CHANGE UP
-  CEFOXITIN: SIGNIFICANT CHANGE UP
-  CEFTRIAXONE: SIGNIFICANT CHANGE UP
-  CIPROFLOXACIN: SIGNIFICANT CHANGE UP
-  ERTAPENEM: SIGNIFICANT CHANGE UP
-  GENTAMICIN: SIGNIFICANT CHANGE UP
-  IMIPENEM: SIGNIFICANT CHANGE UP
-  LEVOFLOXACIN: SIGNIFICANT CHANGE UP
-  MEROPENEM: SIGNIFICANT CHANGE UP
-  NITROFURANTOIN: SIGNIFICANT CHANGE UP
-  PIPERACILLIN/TAZOBACTAM: SIGNIFICANT CHANGE UP
-  TOBRAMYCIN: SIGNIFICANT CHANGE UP
-  TRIMETHOPRIM/SULFAMETHOXAZOLE: SIGNIFICANT CHANGE UP
CULTURE RESULTS: SIGNIFICANT CHANGE UP
HCG SERPL-ACNC: 355.2 MIU/ML — SIGNIFICANT CHANGE UP
METHOD TYPE: SIGNIFICANT CHANGE UP
ORGANISM # SPEC MICROSCOPIC CNT: SIGNIFICANT CHANGE UP
ORGANISM # SPEC MICROSCOPIC CNT: SIGNIFICANT CHANGE UP
SPECIMEN SOURCE: SIGNIFICANT CHANGE UP

## 2022-11-23 PROCEDURE — 99202 OFFICE O/P NEW SF 15 MIN: CPT | Mod: TH,GE

## 2022-11-28 ENCOUNTER — EMERGENCY (EMERGENCY)
Facility: HOSPITAL | Age: 37
LOS: 1 days | Discharge: HOME | End: 2022-11-28
Attending: EMERGENCY MEDICINE | Admitting: EMERGENCY MEDICINE

## 2022-11-28 VITALS
HEART RATE: 104 BPM | DIASTOLIC BLOOD PRESSURE: 76 MMHG | RESPIRATION RATE: 19 BRPM | SYSTOLIC BLOOD PRESSURE: 132 MMHG | OXYGEN SATURATION: 99 % | TEMPERATURE: 99 F

## 2022-11-28 VITALS
TEMPERATURE: 98 F | HEART RATE: 90 BPM | RESPIRATION RATE: 18 BRPM | OXYGEN SATURATION: 100 % | SYSTOLIC BLOOD PRESSURE: 109 MMHG | DIASTOLIC BLOOD PRESSURE: 63 MMHG

## 2022-11-28 LAB
ALBUMIN SERPL ELPH-MCNC: 4.1 G/DL — SIGNIFICANT CHANGE UP (ref 3.3–5)
ALP SERPL-CCNC: 83 U/L — SIGNIFICANT CHANGE UP (ref 40–120)
ALT FLD-CCNC: 17 U/L — SIGNIFICANT CHANGE UP (ref 4–33)
ANION GAP SERPL CALC-SCNC: 11 MMOL/L — SIGNIFICANT CHANGE UP (ref 7–14)
APPEARANCE UR: CLEAR — SIGNIFICANT CHANGE UP
APTT BLD: 30.4 SEC — SIGNIFICANT CHANGE UP (ref 27–36.3)
AST SERPL-CCNC: 13 U/L — SIGNIFICANT CHANGE UP (ref 4–32)
BASOPHILS # BLD AUTO: 0.05 K/UL — SIGNIFICANT CHANGE UP (ref 0–0.2)
BASOPHILS NFR BLD AUTO: 0.5 % — SIGNIFICANT CHANGE UP (ref 0–2)
BILIRUB SERPL-MCNC: <0.2 MG/DL — SIGNIFICANT CHANGE UP (ref 0.2–1.2)
BILIRUB UR-MCNC: NEGATIVE — SIGNIFICANT CHANGE UP
BLD GP AB SCN SERPL QL: NEGATIVE — SIGNIFICANT CHANGE UP
BUN SERPL-MCNC: 7 MG/DL — SIGNIFICANT CHANGE UP (ref 7–23)
CALCIUM SERPL-MCNC: 9.2 MG/DL — SIGNIFICANT CHANGE UP (ref 8.4–10.5)
CHLORIDE SERPL-SCNC: 104 MMOL/L — SIGNIFICANT CHANGE UP (ref 98–107)
CO2 SERPL-SCNC: 20 MMOL/L — LOW (ref 22–31)
COLOR SPEC: SIGNIFICANT CHANGE UP
CREAT SERPL-MCNC: 0.43 MG/DL — LOW (ref 0.5–1.3)
DIFF PNL FLD: NEGATIVE — SIGNIFICANT CHANGE UP
EGFR: 128 ML/MIN/1.73M2 — SIGNIFICANT CHANGE UP
EOSINOPHIL # BLD AUTO: 0.21 K/UL — SIGNIFICANT CHANGE UP (ref 0–0.5)
EOSINOPHIL NFR BLD AUTO: 1.9 % — SIGNIFICANT CHANGE UP (ref 0–6)
FLUAV AG NPH QL: SIGNIFICANT CHANGE UP
FLUBV AG NPH QL: SIGNIFICANT CHANGE UP
GLUCOSE SERPL-MCNC: 77 MG/DL — SIGNIFICANT CHANGE UP (ref 70–99)
GLUCOSE UR QL: NEGATIVE — SIGNIFICANT CHANGE UP
HCG SERPL-ACNC: 2174 MIU/ML — SIGNIFICANT CHANGE UP
HCT VFR BLD CALC: 41.3 % — SIGNIFICANT CHANGE UP (ref 34.5–45)
HGB BLD-MCNC: 12.8 G/DL — SIGNIFICANT CHANGE UP (ref 11.5–15.5)
IANC: 8.33 K/UL — HIGH (ref 1.8–7.4)
IMM GRANULOCYTES NFR BLD AUTO: 0.6 % — SIGNIFICANT CHANGE UP (ref 0–0.9)
INR BLD: 1.14 RATIO — SIGNIFICANT CHANGE UP (ref 0.88–1.16)
KETONES UR-MCNC: NEGATIVE — SIGNIFICANT CHANGE UP
LEUKOCYTE ESTERASE UR-ACNC: ABNORMAL
LYMPHOCYTES # BLD AUTO: 1.61 K/UL — SIGNIFICANT CHANGE UP (ref 1–3.3)
LYMPHOCYTES # BLD AUTO: 14.6 % — SIGNIFICANT CHANGE UP (ref 13–44)
MCHC RBC-ENTMCNC: 25.6 PG — LOW (ref 27–34)
MCHC RBC-ENTMCNC: 31 GM/DL — LOW (ref 32–36)
MCV RBC AUTO: 82.6 FL — SIGNIFICANT CHANGE UP (ref 80–100)
MONOCYTES # BLD AUTO: 0.79 K/UL — SIGNIFICANT CHANGE UP (ref 0–0.9)
MONOCYTES NFR BLD AUTO: 7.1 % — SIGNIFICANT CHANGE UP (ref 2–14)
NEUTROPHILS # BLD AUTO: 8.33 K/UL — HIGH (ref 1.8–7.4)
NEUTROPHILS NFR BLD AUTO: 75.3 % — SIGNIFICANT CHANGE UP (ref 43–77)
NITRITE UR-MCNC: NEGATIVE — SIGNIFICANT CHANGE UP
NRBC # BLD: 0 /100 WBCS — SIGNIFICANT CHANGE UP (ref 0–0)
NRBC # FLD: 0 K/UL — SIGNIFICANT CHANGE UP (ref 0–0)
PH UR: 7 — SIGNIFICANT CHANGE UP (ref 5–8)
PLATELET # BLD AUTO: 379 K/UL — SIGNIFICANT CHANGE UP (ref 150–400)
POTASSIUM SERPL-MCNC: 3.9 MMOL/L — SIGNIFICANT CHANGE UP (ref 3.5–5.3)
POTASSIUM SERPL-SCNC: 3.9 MMOL/L — SIGNIFICANT CHANGE UP (ref 3.5–5.3)
PROT SERPL-MCNC: 7 G/DL — SIGNIFICANT CHANGE UP (ref 6–8.3)
PROT UR-MCNC: NEGATIVE — SIGNIFICANT CHANGE UP
PROTHROM AB SERPL-ACNC: 13.3 SEC — SIGNIFICANT CHANGE UP (ref 10.5–13.4)
RBC # BLD: 5 M/UL — SIGNIFICANT CHANGE UP (ref 3.8–5.2)
RBC # FLD: 14.8 % — HIGH (ref 10.3–14.5)
RH IG SCN BLD-IMP: POSITIVE — SIGNIFICANT CHANGE UP
RSV RNA NPH QL NAA+NON-PROBE: SIGNIFICANT CHANGE UP
SARS-COV-2 RNA SPEC QL NAA+PROBE: SIGNIFICANT CHANGE UP
SODIUM SERPL-SCNC: 135 MMOL/L — SIGNIFICANT CHANGE UP (ref 135–145)
SP GR SPEC: 1.01 — SIGNIFICANT CHANGE UP (ref 1.01–1.05)
UROBILINOGEN FLD QL: SIGNIFICANT CHANGE UP
WBC # BLD: 11.06 K/UL — HIGH (ref 3.8–10.5)
WBC # FLD AUTO: 11.06 K/UL — HIGH (ref 3.8–10.5)

## 2022-11-28 PROCEDURE — 99285 EMERGENCY DEPT VISIT HI MDM: CPT

## 2022-11-28 PROCEDURE — 76830 TRANSVAGINAL US NON-OB: CPT | Mod: 26

## 2022-11-28 RX ORDER — ACETAMINOPHEN 500 MG
1000 TABLET ORAL ONCE
Refills: 0 | Status: COMPLETED | OUTPATIENT
Start: 2022-11-28 | End: 2022-11-28

## 2022-11-28 RX ADMIN — Medication 400 MILLIGRAM(S): at 14:29

## 2022-11-28 NOTE — PLAN
[FreeTextEntry1] : Ms. Garcia is a 36y/o  at 5w5d by LMP: 10/14 presenting to clinic for a repeat b-hcg. B-hcg with appropriate rise. \par \par - B-Hc () -> 355 ().  Appropriate rise \par - Pt is without abdominal pain or vaginal bleeding. Pregnancy of unknown location was detailed and strict ED return precautions were given. \par - This is a desired pregnancy. Pt to start PNV. Reports no tobacco or alcohol consumption \par - To follow in clinic with a repeat b-hcg and TVUS  in 1 week. \par \par DW: Dr. Pickens \par Cynthia Boyle,PGY-2

## 2022-11-28 NOTE — CONSULT NOTE ADULT - SUBJECTIVE AND OBJECTIVE BOX
MICHELL YEPEZ  37y  Female [id number]    HPI: 38 y/o  6w3d (LMP10/14) presented to the emergency department with left sided pelvic pain and vaginal spotting since this morning. Patient was seen in the ED  for syncope and then found out she was pregnant and has had an up trending bHCG on  at 355. Patient states the vaginal spotting has been a light pink on her pad without passage of clots. She describes the left sided cramping as a pinpoint cramping that does not radiate and has improved with Tylenol. She denies nausea, vomiting, dizziness, diarrhea, constipation, chest pain, shortness of breath, fever or chills. This is a highly desired pregnancy.         Name of GYN Physician:   OBHx: 2 SAB in  &   GynHx: Denies fibroids, cysts, endometriosis, STI's, Abnormal pap smears   PMH: None  PSH: D&C   Meds: None  Allx: None  Social History:  Denies smoking use, drug use, alcohol use.    Vital Signs Last 24 Hrs  T(C): 37 (2022 12:28), Max: 37 (2022 12:28)  T(F): 98.6 (2022 12:28), Max: 98.6 (2022 12:28)  HR: 104 (2022 12:28) (104 - 104)  BP: 132/76 (2022 12:28) (132/76 - 132/76)  BP(mean): --  RR: 19 (2022 12:28) (19 - 19)  SpO2: 99% (2022 12:28) (99% - 99%)    Parameters below as of 2022 12:28  Patient On (Oxygen Delivery Method): room air      Physical Exam:   General: sitting comfortably in bed, NAD   HEENT: neck supple, full ROM  CV: RRR  Lungs: CTA b/l, good air flow b/l   Back: No CVA tenderness  Abd: Soft, non-tender, non-distended.  Bowel sounds present.    :  No bleeding on pad. External labia wnl.  Bimanual exam with no cervical motion tenderness, uterus wnl, adnexa non palpable b/l.  Cervix closed  Speculum Exam: No active bleeding from os.  Physiologic discharge.        LABS:                  12.8   11.06 )-----------( 379      ( 2022 14:00 )             41.3         135  |  104  |  7   ----------------------------<  77  3.9   |  20<L>  |  0.43<L>    Ca    9.2      2022 14:00    TPro  7.0  /  Alb  4.1  /  TBili  <0.2  /  DBili  x   /  AST  13  /  ALT  17  /  AlkPhos  83      I&O's Detail    PT/INR - ( 2022 14:00 )   PT: 13.3 sec;   INR: 1.14 ratio         PTT - ( 2022 14:00 )  PTT:30.4 sec  Urinalysis Basic - ( 2022 14:00 )    Color: Light Yellow / Appearance: Clear / S.015 / pH: x  Gluc: x / Ketone: Negative  / Bili: Negative / Urobili: <2 mg/dL   Blood: x / Protein: Negative / Nitrite: Negative   Leuk Esterase: Small / RBC: 0 /HPF / WBC 0-5 /HPF   Sq Epi: x / Non Sq Epi: Few / Bacteria: x        RADIOLOGY & ADDITIONAL STUDIES:  < from: US Transvaginal (22 @ 15:08) >  ACC: [id number] EXAM:  US TRANSVAGINAL                          PROCEDURE DATE:  2022          INTERPRETATION:  CLINICAL INFORMATION: Vaginal spotting and left lower   quadrant pain in the setting of pregnancy.    LMP: 10/14/2022    Estimated Gestational Age by LMP: 6 weeks 3 days    COMPARISON: Pelvic ultrasound 10/21/2022    Endovaginal pelvic sonogram only.    FINDINGS:  Uterus: Measures 8.0 x 4.7 x 5.4 cm. A single intrauterine saclike   structure is identified near the right fundus that measures 0.5 x 0.3 x   0.4 cm (mean size of 0.4 cm), potentially gestational sac that would   correspond to an estimated gestational age of 4 weeks 6 days. No yolk sac   or fetal pole is identified. The endometrium measures up to 1.8 cm in   thickness.    Right ovary: 5.0 cm x 2.6 cm x 2.9 cm. Within normal limits, and contains   a corpus luteum that measures 1.8 x 2.4 x 2.7 cm. Normal arterial and   venous waveforms.  Left ovary: 3.6 cm x 2.4 cm x 2.1 cm. Within normal limits. Normal   arterialand venous waveforms.    Fluid: Trace fluid in the cul-de-sac, which may be physiologic.    IMPRESSION:  Single intrauterine saclike structure without a yolk sac or fetal pole   yet identified, potentially an early gestational sac versus a   pseudogestational sac in the setting of pregnancy of unknown location.   Continued follow-up with serial hCG and ultrasound is recommended.        --- End of Report ---      JAYSON FLORES MD; Attending Radiologist  This document has been electronically signed. 2022  4:35PM     MICHELL YEPEZ  37y  Female [id number]    HPI: 36 y/o  6w3d (LMP10/14) presented to the emergency department with left sided pelvic pain and vaginal spotting since this morning. Patient was seen in the ED  for an episode of syncope and subsequently found out she was pregnant ([facility] [place] at that [place] 171). Pt w/ uptrending bHCG on  at 355. Patient reports experiencing light pink discharge on her pad without passage of clots. She describes the left sided pelvic cramping as a pinpoint cramping that does not radiate and has improved with Tylenol. She denies nausea, vomiting, dizziness, diarrhea, constipation, chest pain, shortness of breath, fever or chills. This is a highly desired pregnancy.     Name of GYN Physician: [facility]  OBHx: 2 SAB in  & 2017  GynHx: Denies fibroids, cysts, endometriosis, STI's, Abnormal pap smears   PMH: None  PSH: D&C   Meds: None  Allx: None  Social History:  Denies smoking use, drug use, alcohol use.    Vital Signs Last 24 Hrs  T(C): 37 (2022 12:28), Max: 37 (2022 12:28)  T(F): 98.6 (2022 12:28), Max: 98.6 (2022 12:28)  HR: 104 (2022 12:28) (104 - 104)  BP: 132/76 (2022 12:28) (132/76 - 132/76)  BP(mean): --  RR: 19 (2022 12:28) (19 - 19)  SpO2: 99% (2022 12:28) (99% - 99%)    Parameters below as of 2022 12:28  Patient On (Oxygen Delivery Method): room air      Physical Exam:   General: sitting comfortably in bed, NAD   HEENT: neck supple, full ROM  CV: RRR  Lungs: CTA b/l, good air flow b/l   Back: No CVA tenderness  Abd: Soft, non-tender, non-distended.  Bowel sounds present.    :  No bleeding on pad. External labia wnl.  Bimanual exam with no cervical motion tenderness, uterus wnl, adnexa non palpable b/l.  Cervix closed  Speculum Exam: No active bleeding from os.  Physiologic discharge.      LABS:                  12.8   11.06 )-----------( 379      ( 2022 14:00 )             41.3         135  |  104  |  7   ----------------------------<  77  3.9   |  20<L>  |  0.43<L>    Ca    9.2      2022 14:00    TPro  7.0  /  Alb  4.1  /  TBili  <0.2  /  DBili  x   /  AST  13  /  ALT  17  /  AlkPhos  83      I&O's Detail    PT/INR - ( 2022 14:00 )   PT: 13.3 sec;   INR: 1.14 ratio         PTT - ( 2022 14:00 )  PTT:30.4 sec  Urinalysis Basic - ( 2022 14:00 )    Color: Light Yellow / Appearance: Clear / S.015 / pH: x  Gluc: x / Ketone: Negative  / Bili: Negative / Urobili: <2 mg/dL   Blood: x / Protein: Negative / Nitrite: Negative   Leuk Esterase: Small / RBC: 0 /HPF / WBC 0-5 /HPF   Sq Epi: x / Non Sq Epi: Few / Bacteria: x        RADIOLOGY & ADDITIONAL STUDIES:  < from: US Transvaginal (22 @ 15:08) >  ACC: [id number] EXAM:  US TRANSVAGINAL                          PROCEDURE DATE:  2022          INTERPRETATION:  CLINICAL INFORMATION: Vaginal spotting and left lower   quadrant pain in the setting of pregnancy.    LMP: 10/14/2022    Estimated Gestational Age by LMP: 6 weeks 3 days    COMPARISON: Pelvic ultrasound 10/21/2022    Endovaginal pelvic sonogram only.    FINDINGS:  Uterus: Measures 8.0 x 4.7 x 5.4 cm. A single intrauterine saclike   structure is identified near the right fundus that measures 0.5 x 0.3 x   0.4 cm (mean size of 0.4 cm), potentially gestational sac that would   correspond to an estimated gestational age of 4 weeks 6 days. No yolk sac   or fetal pole is identified. The endometrium measures up to 1.8 cm in   thickness.    Right ovary: 5.0 cm x 2.6 cm x 2.9 cm. Within normal limits, and contains   a corpus luteum that measures 1.8 x 2.4 x 2.7 cm. Normal arterial and   venous waveforms.  Left ovary: 3.6 cm x 2.4 cm x 2.1 cm. Within normal limits. Normal   arterialand venous waveforms.    Fluid: Trace fluid in the cul-de-sac, which may be physiologic.    IMPRESSION:  Single intrauterine saclike structure without a yolk sac or fetal pole   yet identified, potentially an early gestational sac versus a   pseudogestational sac in the setting of pregnancy of unknown location.   Continued follow-up with serial hCG and ultrasound is recommended.        --- End of Report ---      JAYSON FLORES MD; Attending Radiologist  This document has been electronically signed. 2022  4:35PM     MICHELL YEPEZ  37y  Female [id number]    HPI: 38 y/o  6w3d (LMP10/14) presented to the emergency department with left sided pelvic pain and vaginal spotting since this morning. Patient was seen in the ED  for an episode of syncope and subsequently found out she was pregnant (c at that time 171). Pt w/ uptrending bHCG on  at 355. Patient reports experiencing light pink discharge on her pad without passage of clots. She describes the left sided pelvic pain as directly over the pubic bone. It does not radiate and has improved with Tylenol and is worse with ambulation. She denies nausea, vomiting, dizziness, diarrhea, constipation, chest pain, shortness of breath, fever or chills. This is a highly desired pregnancy.     Patient has seen numerous OBGYN providers. She was previously a Dr. Belcher patient but was seen in [facility] clinic on . She has an appointment with Dr. Kevin Zheng for this .  Name of GYN Physician: [facility] clinic    OBHx: 2 SAB in  & 2017  GynHx: Denies fibroids, cysts, endometriosis, STI's, Abnormal pap smears   PMH: None  PSH: D&C   Meds: None  Allx: None  Social History:  Denies smoking use, drug use, alcohol use.    Vital Signs Last 24 Hrs  T(C): 37 (2022 12:28), Max: 37 (2022 12:28)  T(F): 98.6 (2022 12:28), Max: 98.6 (2022 12:28)  HR: 104 (2022 12:28) (104 - 104)  BP: 132/76 (2022 12:28) (132/76 - 132/76)  BP(mean): --  RR: 19 (2022 12:28) (19 - 19)  SpO2: 99% (2022 12:28) (99% - 99%)    Parameters below as of 2022 12:28  Patient On (Oxygen Delivery Method): room air    Chaperone: Tessie Willoughby RN  Physical Exam:   General: sitting comfortably in bed, NAD   CV: RRR  Lungs: CTA b/l, good air flow b/l   Abd: Soft, non-tender, non-distended.  Bowel sounds present.    :  No bleeding on pad. External labia wnl.  Bimanual exam with no cervical motion tenderness, uterus wnl, adnexa non palpable b/l.  Cervix closed  Speculum Exam: No active bleeding from os. Normal appearing cervix.  Physiologic discharge.      LABS:       HCG Quantitative, Serum: 2174.0               12.8   11.06 )-----------( 379      ( 2022 14:00 )             41.3         135  |  104  |  7   ----------------------------<  77  3.9   |  20<L>  |  0.43<L>    Ca    9.2      2022 14:00    TPro  7.0  /  Alb  4.1  /  TBili  <0.2  /  DBili  x   /  AST  13  /  ALT  17  /  AlkPhos  83      I&O's Detail    PT/INR - ( 2022 14:00 )   PT: 13.3 sec;   INR: 1.14 ratio         PTT - ( 2022 14:00 )  PTT:30.4 sec  Urinalysis Basic - ( 2022 14:00 )    Color: Light Yellow / Appearance: Clear / S.015 / pH: x  Gluc: x / Ketone: Negative  / Bili: Negative / Urobili: <2 mg/dL   Blood: x / Protein: Negative / Nitrite: Negative   Leuk Esterase: Small / RBC: 0 /HPF / WBC 0-5 /HPF   Sq Epi: x / Non Sq Epi: Few / Bacteria: x        RADIOLOGY & ADDITIONAL STUDIES:  < from: US Transvaginal (22 @ 15:08) >  ACC: [id number] EXAM:  US TRANSVAGINAL                          PROCEDURE DATE:  2022          INTERPRETATION:  CLINICAL INFORMATION: Vaginal spotting and left lower   quadrant pain in the setting of pregnancy.    LMP: 10/14/2022    Estimated Gestational Age by LMP: 6 weeks 3 days    COMPARISON: Pelvic ultrasound 10/21/2022    Endovaginal pelvic sonogram only.    FINDINGS:  Uterus: Measures 8.0 x 4.7 x 5.4 cm. A single intrauterine saclike   structure is identified near the right fundus that measures 0.5 x 0.3 x   0.4 cm (mean size of 0.4 cm), potentially gestational sac that would   correspond to an estimated gestational age of 4 weeks 6 days. No yolk sac   or fetal pole is identified. The endometrium measures up to 1.8 cm in   thickness.    Right ovary: 5.0 cm x 2.6 cm x 2.9 cm. Within normal limits, and contains   a corpus luteum that measures 1.8 x 2.4 x 2.7 cm. Normal arterial and   venous waveforms.  Left ovary: 3.6 cm x 2.4 cm x 2.1 cm. Within normal limits. Normal   arterialand venous waveforms.    Fluid: Trace fluid in the cul-de-sac, which may be physiologic.    IMPRESSION:  Single intrauterine saclike structure without a yolk sac or fetal pole   yet identified, potentially an early gestational sac versus a   pseudogestational sac in the setting of pregnancy of unknown location.   Continued follow-up with serial hCG and ultrasound is recommended.        --- End of Report ---      JAYSON FLORES MD; Attending Radiologist  This document has been electronically signed. 2022  4:35PM

## 2022-11-28 NOTE — ED ADULT NURSE NOTE - CHIEF COMPLAINT QUOTE
Pt arrives ambulatory to triage c/o vaginal spotting and cramping that started this AM. Pt state she is 5 wks pregnant. . Went to [facility] and was sent here. Denies other PMH.

## 2022-11-28 NOTE — ED PROVIDER NOTE - CLINICAL SUMMARY MEDICAL DECISION MAKING FREE TEXT BOX
37-year-old female  at 6 weeks gestation by LMP 10/14 presenting with left-sided pelvic pain and vaginal spotting this morning.  Patient was recently seen in the ER 6 days ago with syncope, at that time found to be pregnant, hCG 171 on 10/21 and 355 10/23.  Patient states that she had been feeling well and this morning when she woke up she developed left-sided pelvic pain described as cramping and vaginal spotting.  Patient denies chest pain, dizziness, shortness of breath, weakness, nausea, vomiting, diarrhea, fever/chills, recent travel or illness or any other concerns. 37-year-old female  at 6 weeks gestation by LMP 10/14 presenting with left-sided pelvic pain and vaginal spotting this morning.  Patient was recently seen in the ER 6 days ago with syncope, at that time found to be pregnant, hCG 171 on 10/21 and 355 10/23. On exam pt is well appearing, slight tachycardia, no abd or pelvic tenderness, on  exam os appears closed, mild vaginal spotting. Concern for ectopic/ruptured ectopic. Plan: cbc/cmp, hcg, TVUS, ua, pain control. Likely OBGYN consult.

## 2022-11-28 NOTE — REVIEW OF SYSTEMS
[Nausea] : nausea [Fever] : no fever [Chills] : no chills [Fatigue] : no fatigue [Dyspnea] : no dyspnea [Abdominal Pain] : no abdominal pain [Vomiting] : no vomiting

## 2022-11-28 NOTE — ED PROVIDER NOTE - PROGRESS NOTE DETAILS
MITCHELL Rebolledo: US showing possible gestation sac vs pseudogestational sac, spoke with OB will see pt in the ED. MITCHELL Rebolledo: Pt seen by GYN recommending 48 hour follow up with her OBGYN or in the OB clinic. Pt reports she is feeling well at present, will return sooner with any worsening or concerning symptoms. Franky: spoke with GYN resident regarding plan of 2100 bhcg and f/u with no defineable yolk sac; state pt is hemodynamically stable with nothing seen in adnexa so is stable for d/c but needs to return in 48 hours;

## 2022-11-28 NOTE — ED PROVIDER NOTE - NSFOLLOWUPINSTRUCTIONS_ED_ALL_ED_FT
Follow-up with your OB/GYN in 48 hours on 11/30 to have repeat beta hCG level checked and repeat ultrasound, can also be seen in the OB/GYN clinic at [facility], please call [phone].    Pelvic rest, do not insert anything inside the vagina   Return to the ER sooner with any worsening or concerning symptoms, increased pain, nausea, vomiting, weakness, dizziness or any other concerns.

## 2022-11-28 NOTE — CONSULT NOTE ADULT - ASSESSMENT
36 y/o  6w3d (LMP10/14) presented to the emergency department with left sided pelvic pain and vaginal spotting since this morning. Patient has had up trending bHCG on  at 355 and today  at 2174. Intrauterine saclike structure was seen on TVUS without a yolk sac or fetal pole demonstrating a potential early gestational sac vs. pseudogestational sac in setting of PUL.      # Pregnancy of unknown location  - intrauterine pregnancy vs. ectopic pregnancy vs. SAB  - Follow up with clinic in 48 hours for repeat bHCG and TVUS  - Return if heavy vaginal bleeding or develop abdominal pain  - Primary care per ED team       MITCHELL Shahid  d/w GYN team  38 y/o  6w3d (LMP10/14) presented to the emergency department with left sided pelvic pain and vaginal spotting since this morning. Patient w/ uptrending bHC ()->2174 (). Intrauterine saclike structure was seen on TVUS without a yolk sac or fetal pole demonstrating a potential early gestational sac vs. pseudogestational sac in setting of PUL.    # Pregnancy of unknown location  - intrauterine pregnancy vs. ectopic pregnancy vs. SAB  - Follow up in clinic vs ED in 48 hours for repeat bHCG and TVUS  - Return if heavy vaginal bleeding or develop abdominal pain  - Primary care per ED team     MITCHELL Shahid  d/w GYN team  36 y/o  LMP10/14 approx  6w3d presented to the emergency department with left sided pelvic pain and vaginal spotting since this morning. Patient w/ uptrending bHC ()->2174 (). Intrauterine saclike structure was seen on TVUS without a yolk sac or fetal pole demonstrating a potential early gestational sac vs. pseudogestational sac. Patient has a benign abdomen and is hemodynamically stable without vaginal bleeding on speculum exam.    # Pregnancy of unknown location  - intrauterine pregnancy vs. ectopic pregnancy vs. SAB. Likely IUP given appropriately rising hCG and TVUS suspicious for IUP without concerning extra-uterine findings.  - Follow up in clinic vs private OBGYN in 48 hours for repeat bHCG and TVUS  - Return if heavy vaginal bleeding (1 pad/hr >2hrs), or development of abdominal pain, feeling lightheaded or dizzy.  - Primary care per ED team     [facility] [place] [address]  [phone]     MITCHELL Shahid  Exam by EPHRAIM Hurley, PGY2    d/w Dr. Lara 38 y/o  LMP10/14 approx  6w3d presented to the emergency department with left sided pelvic pain and vaginal spotting since this morning. Patient w/ uptrending bHC ()->2174 (). Intrauterine saclike structure was seen on TVUS without a yolk sac or fetal pole demonstrating a potential early gestational sac vs. pseudogestational sac. Patient has a benign abdomen and is hemodynamically stable without vaginal bleeding on speculum exam.    # Pregnancy of unknown location  - intrauterine pregnancy vs. ectopic pregnancy vs. SAB. Likely IUP given appropriately rising hCG and TVUS suspicious for IUP without concerning extra-uterine findings.  - Follow up in clinic vs private OBGYN in 48 hours for repeat bHCG and TVUS  - Return if heavy vaginal bleeding (1 pad/hr >2hrs), or development of abdominal pain, feeling lightheaded or dizzy.  - Primary care per ED team     [facility] [place] [address]  [phone]     MITCHELL Shahid  Exam by EPHRAIM Hurley, PGY2    d/w Dr. Lara    Agree with above plan, Rh+ no need for Rhogam.  Precautions given pt to follow up in 48 hrs.    Nadia Lara MD

## 2022-11-28 NOTE — ED PROVIDER NOTE - PATIENT PORTAL LINK FT
You can access the FollowMyHealth Patient Portal offered by [facility] by registering at the following website: [email or web address]. By joining [employer]’s FollowMyHealth portal, you will also be able to view your health information using other applications (apps) compatible with our system.

## 2022-11-28 NOTE — ED ADULT NURSE NOTE - OBJECTIVE STATEMENT
patient presents to ED c/o LLQ abdominal pain, describes "cramping" feeling. endorses vaginal "spotting pink blood". reports 6 weeks pregnant and hx of 2 miscarriages. denies cp, sob, n/v, fever/chills

## 2022-11-28 NOTE — HISTORY OF PRESENT ILLNESS
[FreeTextEntry1] : Ms. Garcia is a 36y/o  at 5w5d by LMP: 10/14 presenting to clinic for a repeat b-hcg \par \par Pt seen in the ED on  after a syncopal episode and symptoms of lightheaded/ dizziness and found to be incidental pregnant. Patient was advised to follow up with her private OB/GYN ([facility]), but desires to follow here as the location is more coinvent. Today the patient feels well without additional episodes of syncope, lightheaded, or dizzy sensations. Nausea triggered by some foods, but able to tolerate a regular diet without emesis. Denies abdominal pain or vaginal bleeding. This pregnancy is highly desired. \par \par OBHx: 2x SAB \par GYNHx: Denies fibroids, cysts, endometriosis, STI's, Abnormal pap smears \par PMH: Denies (via chart review pt has previously endorsed asthma) \par PSH: Denies \par Meds: Denies \par All: NKDA [TextBox_4] : Name of GYN Physician: [facility]   OBHx: 2x SAB  GYNHx: Denies fibroids, cysts, endometriosis, STI's, Abnormal pap smears  PMH: Denies (via chart review pt has previously endorsed asthma)  PSH: Denies  Meds: Denies  All: NKDA

## 2022-11-28 NOTE — ED ADULT TRIAGE NOTE - CHIEF COMPLAINT QUOTE
Pt arrives ambulatory to triage c/o vaginal spotting and cramping that started this AM. Pt state she is 5 wks pregnant. . Went to [facility] and was sent here. Denies other PMH.

## 2022-11-28 NOTE — ED PROVIDER NOTE - OBJECTIVE STATEMENT
37-year-old female  at 6 weeks gestation by LMP 10/14 presenting with left-sided pelvic pain and vaginal spotting this morning.  Patient was recently seen in the ER 5 days ago with syncope, at that time found to be pregnant, hCG 176.  Patient states that she had been feeling well and this morning when she woke up she developed left-sided pelvic pain described as cramping and vaginal spotting.  Patient denies chest pain, dizziness, shortness of breath, weakness, nausea, vomiting, diarrhea, fever/chills, recent travel or illness or any other concerns. 37-year-old female  at 6 weeks gestation by LMP 10/14 presenting with left-sided pelvic pain and vaginal spotting this morning.  Patient was recently seen in the ER 6 days ago with syncope, at that time found to be pregnant, hCG 171 on 10/21 and 355 10/23.  Patient states that she had been feeling well and this morning when she woke up she developed left-sided pelvic pain described as cramping and vaginal spotting.  Patient denies chest pain, dizziness, shortness of breath, weakness, nausea, vomiting, diarrhea, fever/chills, recent travel or illness or any other concerns.

## 2022-11-28 NOTE — ED PROVIDER NOTE - ATTENDING APP SHARED VISIT CONTRIBUTION OF CARE
37-year-old female G3, P0 at approximately 6 weeks by LMP.  Presents to emergency room for pelvic pain, vaginal bleeding.  Patient has been to ER twice in prior week with uptrending beta-hCG.  Denies passage of clots or tissue.  Denies chest pain, shortness of breath, syncope.  Prior 2 pregnancies were miscarriages.  Physical exam  Gen: pt well appearing in no respiratory distress  vital signs stable  NCAT  Lungs: CTAB/L  Cardiac: s1 s2 no m/r/g  abdomen: soft/NT/ND  ext: no edema  Neuro: CNs intact 5/5 motor UE and LE; sensation intact; gait stable  skin: no rash  Impression  Transvaginal ultrasound shows no evidence of IUP, beta hCG above 2000, will need OB/GYN evaluation for possible ectopic pregnancy, patient is hemodynamically stable

## 2022-11-29 DIAGNOSIS — O00.90 UNSPECIFIED ECTOPIC PREGNANCY WITHOUT INTRAUTERINE PREGNANCY: ICD-10-CM

## 2022-11-29 DIAGNOSIS — O36.80X0 PREGNANCY WITH INCONCLUSIVE FETAL VIABILITY, NOT APPLICABLE OR UNSPECIFIED: ICD-10-CM

## 2022-11-29 LAB
CULTURE RESULTS: SIGNIFICANT CHANGE UP
SPECIMEN SOURCE: SIGNIFICANT CHANGE UP

## 2022-11-29 NOTE — CHART NOTE - NSCHARTNOTEFT_GEN_A_CORE
Patient called to confirm that she was able to make a clinic appointment to follow-up her beta HCG. Spoke with patient on the phone and they confirmed they will be seen in clinic on 11/30. Will continue to follow until beta HCG is negative.      Trina Sow, PGY1

## 2022-11-30 ENCOUNTER — APPOINTMENT (OUTPATIENT)
Dept: OBGYN | Facility: HOSPITAL | Age: 37
End: 2022-11-30

## 2022-11-30 ENCOUNTER — RESULT REVIEW (OUTPATIENT)
Age: 37
End: 2022-11-30

## 2022-11-30 ENCOUNTER — OUTPATIENT (OUTPATIENT)
Dept: OUTPATIENT SERVICES | Facility: HOSPITAL | Age: 37
LOS: 1 days | End: 2022-11-30

## 2022-11-30 VITALS
DIASTOLIC BLOOD PRESSURE: 72 MMHG | SYSTOLIC BLOOD PRESSURE: 114 MMHG | HEIGHT: 61 IN | TEMPERATURE: 97.9 F | WEIGHT: 209 LBS | BODY MASS INDEX: 39.46 KG/M2 | HEART RATE: 93 BPM

## 2022-11-30 LAB — HCG SERPL-ACNC: 3655 MIU/ML — SIGNIFICANT CHANGE UP

## 2022-11-30 PROCEDURE — 99213 OFFICE O/P EST LOW 20 MIN: CPT | Mod: TH,GE

## 2022-11-30 NOTE — END OF VISIT
[] : Resident [FreeTextEntry3] : PUL without pain\par Pt to f/u in 48 hours for bhcg and official sono\par \par reynaldo DWYER

## 2022-11-30 NOTE — PROCEDURE
[Transvaginal Ultrasound] : transvaginal ultrasound [FreeTextEntry3] : no gestational sac visualized

## 2022-11-30 NOTE — HISTORY OF PRESENT ILLNESS
[FreeTextEntry1] : 36 y/o  6w5d by LMP 10/14 presenting to clinic for beta HCG follow-up.\par \par Patient states that since last visit on  she presented to ED on  with vaginal spotting and light cramping. Since then vaginal spotting has lessened and cramping resolved.\par \par ObHx: SAB due to unknown causes ~"4 mo", early SABx1\par GynHx: denies hx fibroids, cysts, endometriosis, abnormal pap smears, STDs

## 2022-11-30 NOTE — PLAN
[FreeTextEntry1] : 36 y/o  6w5d by LMP 10/14 presenting to clinic for beta HCG follow-up.\par \par #Pregnancy of Unknown Location\par - bHC () -> 355 () -> 2174 () -> 3655 ()\par - No definite IUP visualized on office TVUS today\par - Referral for official TVUS given\par - RTC on Friday for bHCG check\par - Patient offered social work due to anxiety regarding situation, declined\par \par Discussed with attending Dr Sotelo\par Birgit Damon\par PGY2

## 2022-12-02 ENCOUNTER — OUTPATIENT (OUTPATIENT)
Dept: OUTPATIENT SERVICES | Facility: HOSPITAL | Age: 37
LOS: 1 days | End: 2022-12-02

## 2022-12-02 ENCOUNTER — APPOINTMENT (OUTPATIENT)
Dept: OBGYN | Facility: HOSPITAL | Age: 37
End: 2022-12-02

## 2022-12-02 ENCOUNTER — RESULT REVIEW (OUTPATIENT)
Age: 37
End: 2022-12-02

## 2022-12-02 VITALS
HEIGHT: 61 IN | HEART RATE: 84 BPM | SYSTOLIC BLOOD PRESSURE: 119 MMHG | TEMPERATURE: 97.65 F | BODY MASS INDEX: 39.46 KG/M2 | DIASTOLIC BLOOD PRESSURE: 47 MMHG | WEIGHT: 209 LBS

## 2022-12-02 DIAGNOSIS — O36.80X0 PREGNANCY WITH INCONCLUSIVE FETAL VIABILITY, NOT APPLICABLE OR UNSPECIFIED: ICD-10-CM

## 2022-12-02 LAB — HCG SERPL-ACNC: 4804 MIU/ML — SIGNIFICANT CHANGE UP

## 2022-12-02 PROCEDURE — 99213 OFFICE O/P EST LOW 20 MIN: CPT | Mod: TH,GC

## 2022-12-02 NOTE — DISCUSSION/SUMMARY
[FreeTextEntry1] : 38 y/o  6w5d by LMP 10/14 presenting to clinic for beta HCG follow-up.\par \par #Pregnancy of Unknown Location\par - bHC () -> 355 () -> 2174 () -> 3655 () -> 4804 ()\par - Gestational sac without yolk sac visualized on TVUS in clinic today\par - Official TVUS \par - RTC  for repeat beta HCG and to discuss official TVUS findings\par \par Patient seen and scanned with GYN chief, Dr. Null PGY4\par Discussed with attending Dr Sotelo

## 2022-12-02 NOTE — HISTORY OF PRESENT ILLNESS
[FreeTextEntry1] : 38 y/o  6w5d by LMP 10/14 presenting to clinic for beta HCG follow-up for PUL.\par \par Patient continues to follow up for serial beta HCGs for PUL. This is a desired pregnancy. Last seen in clinic on  with no definitive IUP visualized. Denies vaginal bleeding/spotting, pelvic pain since last visit.\par \par bHC () -> 355 () -> 2174 () -> 3655 () -> 4804 ()\par \par ObHx: SAB due to unknown causes ~"4 mo", early SABx1\par GynHx: denies hx fibroids, cysts, endometriosis, abnormal pap smears, STDs

## 2022-12-02 NOTE — PROCEDURE
[Transvaginal OB Sonogram] : Transvaginal OB Sonogram [FreeTextEntry1] : Gestational sac visualized and measured to be 0.7cm x 0.7cm. No visible yolk sac or fetal pole.

## 2022-12-02 NOTE — END OF VISIT
[] : Resident [FreeTextEntry3] : Agree with above\par PUL with gestational sac\par For official ultrasound and repeat bhcg\par Ectopic precautions\par \par reynaldo DWYER

## 2022-12-02 NOTE — REVIEW OF SYSTEMS
[Negative] : Heme/Lymph [Fever] : no fever [Chills] : no chills [Abdominal Pain] : no abdominal pain [Vomiting] : no vomiting [Nausea] : no nausea [Dysuria] : no dysuria [Pelvic pain] : no pelvic pain

## 2022-12-05 ENCOUNTER — OUTPATIENT (OUTPATIENT)
Dept: OUTPATIENT SERVICES | Facility: HOSPITAL | Age: 37
LOS: 1 days | End: 2022-12-05

## 2022-12-05 ENCOUNTER — APPOINTMENT (OUTPATIENT)
Dept: OBGYN | Facility: HOSPITAL | Age: 37
End: 2022-12-05

## 2022-12-05 ENCOUNTER — RESULT REVIEW (OUTPATIENT)
Age: 37
End: 2022-12-05

## 2022-12-05 ENCOUNTER — APPOINTMENT (OUTPATIENT)
Dept: ULTRASOUND IMAGING | Facility: CLINIC | Age: 37
End: 2022-12-05

## 2022-12-05 VITALS
TEMPERATURE: 98 F | BODY MASS INDEX: 38.72 KG/M2 | WEIGHT: 210.4 LBS | SYSTOLIC BLOOD PRESSURE: 118 MMHG | DIASTOLIC BLOOD PRESSURE: 63 MMHG | HEART RATE: 90 BPM | HEIGHT: 62 IN

## 2022-12-05 LAB — HCG SERPL-ACNC: 9822 MIU/ML — SIGNIFICANT CHANGE UP

## 2022-12-05 PROCEDURE — 76817 TRANSVAGINAL US OBSTETRIC: CPT

## 2022-12-05 PROCEDURE — 99213 OFFICE O/P EST LOW 20 MIN: CPT | Mod: TH,GE

## 2022-12-06 DIAGNOSIS — O02.0 BLIGHTED OVUM AND NONHYDATIDIFORM MOLE: ICD-10-CM

## 2022-12-06 DIAGNOSIS — O36.80X0 PREGNANCY WITH INCONCLUSIVE FETAL VIABILITY, NOT APPLICABLE OR UNSPECIFIED: ICD-10-CM

## 2022-12-06 NOTE — HISTORY OF PRESENT ILLNESS
[FreeTextEntry1] : 37  @ 7.3wga (by LMP 10/14/2022) who presents for follow-up b-hCG in the setting of a PUL. Patient incidentally found to be pregnant on ED visit after presenting w/ a syncopal episode. Currently endorsing mild n/v and breast tenderness. Denies any VB or abdominal pain. Says she is anxious because she does not have an answer as to whether or not this is an IUP (pregnancy is desired). Upon clarification, patient says her menses are regular\par \par SocFluencyx: Works at [employer] \par \par bHC () -> 355 () -> 2174 () -> 3655 () -> 4804 () -> 9822 ()\par \par TVUS (2022)\par \par FINDINGS:\par Uterus: Probable early intrauterine gestational sac with decidual reaction 2.3 cm. No embryo or yolk sac identified.\par \par Gestational Sac Size (mean): 13 mm equivalent to 5.5 weeks\par Gestations Sac Shape : Ovoid\par \par Right ovary: Within normal limits. (Corpus luteum)\par Left ovary: Within normal limits.\par \par Fluid: None.

## 2022-12-06 NOTE — PLAN
[FreeTextEntry1] : 36 y/o  7.3wga by LMP 10/14 presenting to clinic for beta HCG follow-up. hCG uptrend-ing but formal TVUS today without yolk sac or embryo\par \par #Pregnancy of Unknown Location\par - bHC () -> 355 () -> 2174 () -> 3655 () -> 4804 () -> 9822 () \par - Patient does not meet criteria for failed first trimester pregnancy. At this point, can still consider early IUP vs. ectopic pregnancy (less likely given no abnormal adnexal masses seen on sono) vs. PUL. Discussed this with patient and reviewed s/s of ectopic pregnancy. Patient verbalized understanding and was able to do teach-back\par - Noted that gestational sac is measuring smaller (5.5wga) compared to EGA by dates (pt reports regular menses)\par \par Trell Blair, PGY-1\par d/w Dr. Elena \par \par RTC  for repeat beta HCG and to discuss official TVUS findings

## 2022-12-12 ENCOUNTER — APPOINTMENT (OUTPATIENT)
Dept: OBGYN | Facility: HOSPITAL | Age: 37
End: 2022-12-12

## 2022-12-12 ENCOUNTER — RESULT REVIEW (OUTPATIENT)
Age: 37
End: 2022-12-12

## 2022-12-12 ENCOUNTER — APPOINTMENT (OUTPATIENT)
Dept: ULTRASOUND IMAGING | Facility: IMAGING CENTER | Age: 37
End: 2022-12-12

## 2022-12-12 ENCOUNTER — OUTPATIENT (OUTPATIENT)
Dept: OUTPATIENT SERVICES | Facility: HOSPITAL | Age: 37
LOS: 1 days | End: 2022-12-12

## 2022-12-12 ENCOUNTER — OUTPATIENT (OUTPATIENT)
Dept: OUTPATIENT SERVICES | Facility: HOSPITAL | Age: 37
LOS: 1 days | End: 2022-12-12
Payer: MEDICAID

## 2022-12-12 VITALS
BODY MASS INDEX: 38.64 KG/M2 | DIASTOLIC BLOOD PRESSURE: 67 MMHG | SYSTOLIC BLOOD PRESSURE: 129 MMHG | HEIGHT: 62 IN | WEIGHT: 210 LBS | HEART RATE: 86 BPM | TEMPERATURE: 97.9 F

## 2022-12-12 DIAGNOSIS — O36.80X0 PREGNANCY WITH INCONCLUSIVE FETAL VIABILITY, NOT APPLICABLE OR UNSPECIFIED: ICD-10-CM

## 2022-12-12 LAB — HCG SERPL-ACNC: SIGNIFICANT CHANGE UP MIU/ML

## 2022-12-12 PROCEDURE — 76817 TRANSVAGINAL US OBSTETRIC: CPT

## 2022-12-12 PROCEDURE — 76817 TRANSVAGINAL US OBSTETRIC: CPT | Mod: 26

## 2022-12-12 PROCEDURE — 99213 OFFICE O/P EST LOW 20 MIN: CPT | Mod: TH,GE

## 2022-12-13 DIAGNOSIS — O36.80X0 PREGNANCY WITH INCONCLUSIVE FETAL VIABILITY, NOT APPLICABLE OR UNSPECIFIED: ICD-10-CM

## 2022-12-13 NOTE — DISCUSSION/SUMMARY
[FreeTextEntry1] : 36 y/o  7.3wga by LMP 10/14 presenting to clinic for beta HCG follow-up. hCG uptrend-ing but informal TVUS today without yolk sac or embryo\par \par #Pregnancy of Unknown Location\par - bHC () -> 355 () -> 2174 () -> 3655 () -> 4804 () -> 9822 ()->56077()\par - Pt desires expected management for 1 week. Will get TVUS and f/u in one week\par \par Yo Eller PGY-1\par d/w Dr. Sotelo

## 2022-12-13 NOTE — END OF VISIT
[] : Resident [FreeTextEntry3] : Pt being followed up PUL\par Beta continues to uptrend, TVUS today shows empty abnormal appearing gestational sac\par Likely failed IUP, Given this is a highly desired pregnancy, pt desires expectant mgmt and repeat sono in 1 week\par Bleeding precautions reviewed\par RTC 1 week\par \par reynaldo DWYER

## 2022-12-13 NOTE — HISTORY OF PRESENT ILLNESS
[FreeTextEntry1] : History of Present Illness\par 37  @ 8.3wga (by LMP 10/14/2022) who presents for follow-up b-hCG in the setting of a PUL. Patient incidentally found to be pregnant on ED visit after presenting w/ a syncopal episode. Denies any VB or abdominal pain. Says she is anxious because she does not have an answer as to whether or not this is an IUP (pregnancy is desired). Results were reviewed with pt (bHCG and TVUS 22) and that this was most likely not a normal intrauterine pregnancy. \par \par SocEquityNetx: Works at [employer] \par \par bHC () -> 355 () -> 2174 () -> 3655 () -> 4804 () -> 9822 ()->24,774()\par \par TVUS (2022)\par \par FINDINGS:\par Uterus: Probable early intrauterine gestational sac with decidual reaction 2.3 cm. No embryo or yolk sac identified.\par \par Gestational Sac Size (mean): 13 mm equivalent to 5.5 weeks\par Gestations Sac Shape : Ovoid\par \par Right ovary: Within normal limits. (Corpus luteum)\par Left ovary: Within normal limits.\par \par Fluid: None. \par \par TVUS():Reviewed by Dr. Sotelo and Dr. Frankel PGY4: No intrauterine pregnancy.

## 2022-12-16 ENCOUNTER — EMERGENCY (EMERGENCY)
Facility: HOSPITAL | Age: 37
LOS: 1 days | Discharge: HOME | End: 2022-12-16
Attending: EMERGENCY MEDICINE | Admitting: EMERGENCY MEDICINE

## 2022-12-16 VITALS
DIASTOLIC BLOOD PRESSURE: 63 MMHG | RESPIRATION RATE: 16 BRPM | TEMPERATURE: 99 F | SYSTOLIC BLOOD PRESSURE: 116 MMHG | HEART RATE: 90 BPM | OXYGEN SATURATION: 100 %

## 2022-12-16 VITALS
RESPIRATION RATE: 16 BRPM | OXYGEN SATURATION: 100 % | DIASTOLIC BLOOD PRESSURE: 69 MMHG | SYSTOLIC BLOOD PRESSURE: 122 MMHG | HEART RATE: 92 BPM | TEMPERATURE: 98 F

## 2022-12-16 LAB
ALBUMIN SERPL ELPH-MCNC: 4 G/DL — SIGNIFICANT CHANGE UP (ref 3.3–5)
ALP SERPL-CCNC: 76 U/L — SIGNIFICANT CHANGE UP (ref 40–120)
ALT FLD-CCNC: 13 U/L — SIGNIFICANT CHANGE UP (ref 4–33)
ANION GAP SERPL CALC-SCNC: 11 MMOL/L — SIGNIFICANT CHANGE UP (ref 7–14)
APTT BLD: 29.3 SEC — SIGNIFICANT CHANGE UP (ref 27–36.3)
AST SERPL-CCNC: 24 U/L — SIGNIFICANT CHANGE UP (ref 4–32)
BILIRUB SERPL-MCNC: 0.3 MG/DL — SIGNIFICANT CHANGE UP (ref 0.2–1.2)
BLD GP AB SCN SERPL QL: NEGATIVE — SIGNIFICANT CHANGE UP
BUN SERPL-MCNC: 9 MG/DL — SIGNIFICANT CHANGE UP (ref 7–23)
CALCIUM SERPL-MCNC: 9.2 MG/DL — SIGNIFICANT CHANGE UP (ref 8.4–10.5)
CHLORIDE SERPL-SCNC: 99 MMOL/L — SIGNIFICANT CHANGE UP (ref 98–107)
CO2 SERPL-SCNC: 22 MMOL/L — SIGNIFICANT CHANGE UP (ref 22–31)
CREAT SERPL-MCNC: 0.38 MG/DL — LOW (ref 0.5–1.3)
EGFR: 132 ML/MIN/1.73M2 — SIGNIFICANT CHANGE UP
GLUCOSE SERPL-MCNC: 83 MG/DL — SIGNIFICANT CHANGE UP (ref 70–99)
HCG SERPL-ACNC: SIGNIFICANT CHANGE UP MIU/ML
HCT VFR BLD CALC: 40.2 % — SIGNIFICANT CHANGE UP (ref 34.5–45)
HGB BLD-MCNC: 12.7 G/DL — SIGNIFICANT CHANGE UP (ref 11.5–15.5)
INR BLD: 1.21 RATIO — HIGH (ref 0.88–1.16)
MCHC RBC-ENTMCNC: 25.8 PG — LOW (ref 27–34)
MCHC RBC-ENTMCNC: 31.6 GM/DL — LOW (ref 32–36)
MCV RBC AUTO: 81.5 FL — SIGNIFICANT CHANGE UP (ref 80–100)
NRBC # BLD: 0 /100 WBCS — SIGNIFICANT CHANGE UP (ref 0–0)
NRBC # FLD: 0 K/UL — SIGNIFICANT CHANGE UP (ref 0–0)
PLATELET # BLD AUTO: 403 K/UL — HIGH (ref 150–400)
POTASSIUM SERPL-MCNC: 4.9 MMOL/L — SIGNIFICANT CHANGE UP (ref 3.5–5.3)
POTASSIUM SERPL-SCNC: 4.9 MMOL/L — SIGNIFICANT CHANGE UP (ref 3.5–5.3)
PROT SERPL-MCNC: 7.5 G/DL — SIGNIFICANT CHANGE UP (ref 6–8.3)
PROTHROM AB SERPL-ACNC: 14.1 SEC — HIGH (ref 10.5–13.4)
RBC # BLD: 4.93 M/UL — SIGNIFICANT CHANGE UP (ref 3.8–5.2)
RBC # FLD: 14.4 % — SIGNIFICANT CHANGE UP (ref 10.3–14.5)
RH IG SCN BLD-IMP: POSITIVE — SIGNIFICANT CHANGE UP
SARS-COV-2 RNA SPEC QL NAA+PROBE: SIGNIFICANT CHANGE UP
SODIUM SERPL-SCNC: 132 MMOL/L — LOW (ref 135–145)
WBC # BLD: 10.36 K/UL — SIGNIFICANT CHANGE UP (ref 3.8–10.5)
WBC # FLD AUTO: 10.36 K/UL — SIGNIFICANT CHANGE UP (ref 3.8–10.5)

## 2022-12-16 PROCEDURE — 76830 TRANSVAGINAL US NON-OB: CPT | Mod: 26

## 2022-12-16 PROCEDURE — 99285 EMERGENCY DEPT VISIT HI MDM: CPT

## 2022-12-16 NOTE — ED ADULT TRIAGE NOTE - CHIEF COMPLAINT QUOTE
Pt is 9 weeks pregnant sent by OB/GYN  with abd pain. and vag spotting.  PT states. "there's only a sac , and no baby"

## 2022-12-16 NOTE — ED PROVIDER NOTE - CROS ED GU ALL NEG
Mid-Back Strain Rehab  Ask your health care provider which exercises are safe for you. Do exercises exactly as told by your health care provider and adjust them as directed. It is normal to feel mild stretching, pulling, tightness, or discomfort as you do these exercises, but you should stop right away if you feel sudden pain or your pain gets worse. Do not begin these exercises until told by your health care provider.  Stretching and range of motion exercises  This exercise warms up your muscles and joints and improves the movement and flexibility of your back and shoulders. This exercise also help to relieve pain.  Exercise A: Chest and spine stretch  1. Lie down on your back on a firm surface.  2. Roll a towel or a small blanket so it is about 4 inches (10 cm) in diameter.  3. Put the towel lengthwise under the middle of your back so it is under your spine, but not under your shoulder blades.  4. To increase the stretch, you may put your hands behind your head and let your elbows fall to your sides.  5. Hold for __________ seconds.  Repeat exercise __________ times. Complete this exercise __________ times a day.  Strengthening exercises  These exercises build strength and endurance in your back and your shoulder blade muscles. Endurance is the ability to use your muscles for a long time, even after they get tired.  Exercise B: Alternating arm and leg raises  1. Get on your hands and knees on a firm surface. If you are on a hard floor, you may want to use padding to cushion your knees, such as an exercise mat.  2. Line up your arms and legs. Your hands should be below your shoulders, and your knees should be below your hips.  3. Lift your left leg behind you. At the same time, raise your right arm and straighten it in front of you.  ¨ Do not lift your leg higher than your hip.  ¨ Do not lift your arm higher than your shoulder.  ¨ Keep your abdominal and back muscles tight.  ¨ Keep your hips facing the  "ground.  ¨ Do not arch your back.  ¨ Keep your balance carefully, and do not hold your breath.  4. Hold for __________ seconds.  5. Slowly return to the starting position and repeat with your right leg and your left arm.  Repeat __________ times. Complete this exercise __________ times a day.  Exercise C: Straight arm rows (shoulder extension)  1. Stand with your feet shoulder width apart.  2. Secure an exercise band to a stable object in front of you so the band is at or above shoulder height.  3. Hold one end of the exercise band in each hand.  4. Straighten your elbows and lift your hands up to shoulder height.  5. Step back, away from the secured end of the exercise band, until the band stretches.  6. Squeeze your shoulder blades together and pull your hands down to the sides of your thighs. Stop when your hands are straight down by your sides. Do not let your hands go behind your body.  7. Hold for __________ seconds.  8. Slowly return to the starting position.  Repeat __________ times. Complete this exercise __________ times a day.  Exercise D: Shoulder external rotation, prone  1. Lie on your abdomen on a firm bed so your left / right forearm hangs over the edge of the bed and your upper arm is on the bed, straight out from your body.  ¨ Your elbow should be bent.  ¨ Your palm should be facing your feet.  2. If instructed, hold a __________ weight in your hand.  3. Squeeze your shoulder blade toward the middle of your back. Do not let your shoulder lift toward your ear.  4. Keep your elbow bent in an \"L\" shape (90 degrees) while you slowly move your forearm up toward the ceiling. Move your forearm up to the height of the bed, toward your head.  ¨ Your upper arm should not move.  ¨ At the top of the movement, your palm should face the floor.  5. Hold for __________ seconds.  6. Slowly return to the starting position and relax your muscles.  Repeat __________ times. Complete this exercise __________ times a " day.  Exercise E: Scapular retraction and external rotation, rowing  1. Sit in a stable chair without armrests, or stand.  2. Secure an exercise band to a stable object in front of you so it is at shoulder height.  3. Hold one end of the exercise band in each hand.  4. Bring your arms out straight in front of you.  5. Step back, away from the secured end of the exercise band, until the band stretches.  6. Pull the band backward. As you do this, bend your elbows and squeeze your shoulder blades together, but avoid letting the rest of your body move. Do not let your shoulders lift up toward your ears.  7. Stop when your elbows are at your sides or slightly behind your body.  8. Hold for __________ seconds.  9. Slowly straighten your arms to return to the starting position.  Repeat __________ times. Complete this exercise __________ times a day.  Posture and body mechanics     Body mechanics refers to the movements and positions of your body while you do your daily activities. Posture is part of body mechanics. Good posture and healthy body mechanics can help to relieve stress in your body's tissues and joints. Good posture means that your spine is in its natural S-curve position (your spine is neutral), your shoulders are pulled back slightly, and your head is not tipped forward. The following are general guidelines for applying improved posture and body mechanics to your everyday activities.  Standing    · When standing, keep your spine neutral and your feet about hip-width apart. Keep a slight bend in your knees. Your ears, shoulders, and hips should line up.  · When you do a task in which you lean forward while standing in one place for a long time, place one foot up on a stable object that is 2-4 inches (5-10 cm) high, such as a footstool. This helps keep your spine neutral.  Sitting  · When sitting, keep your spine neutral and keep your feet flat on the floor. Use a footrest, if necessary, and keep your thighs  parallel to the floor. Avoid rounding your shoulders, and avoid tilting your head forward.  · When working at a desk or a computer, keep your desk at a height where your hands are slightly lower than your elbows. Slide your chair under your desk so you are close enough to maintain good posture.  · When working at a computer, place your monitor at a height where you are looking straight ahead and you do not have to tilt your head forward or downward to look at the screen.  Resting     When lying down and resting, avoid positions that are most painful for you.  · If you have pain with activities such as sitting, bending, stooping, or squatting (flexion-based activities), lie in a position in which your body does not bend very much. For example, avoid curling up on your side with your arms and knees near your chest (fetal position).  · If you have pain with activities such as standing for a long time or reaching with your arms (extension-based activities), lie with your spine in a neutral position and bend your knees slightly. Try the following positions:  · Lying on your side with a pillow between your knees.  · Lying on your back with a pillow under your knees.  Lifting    · When lifting objects, keep your feet at least shoulder-width apart and tighten your abdominal muscles.  · Bend your knees and hips and keep your spine neutral. It is important to lift using the strength of your legs, not your back. Do not lock your knees straight out.  · Always ask for help to lift heavy or awkward objects.  This information is not intended to replace advice given to you by your health care provider. Make sure you discuss any questions you have with your health care provider.  Document Released: 12/18/2006 Document Revised: 08/24/2017 Document Reviewed: 09/28/2016  [employer] Interactive Patient Education © 2017 [employer]     - - -

## 2022-12-16 NOTE — CONSULT NOTE ADULT - ASSESSMENT
Patient is a 36y/o  LMP 10/14 presenting to the ED for follow up beta HCG and official TVUS. Patient with uptrending bHCG and TVUS continuing to show a gestational sac without yolk sac or fetal pole. Gestational sac was initially visualized on TVUS on . Patient therefore meeting criteria for failed intrauterine pregnancy based on absence of embryo with heartbeat >2 weeks since first visualization of gestational sac.    #Failed intrauterine pregnancy  - 171 () -> 355 () -> 2154 () -> 3655 () -> 4804 () -> 9822 () -> 95530 () -> 46205 ()  - TVUS (): gestational sac measuring 1.9cm in diameter with absence of yolk sac or fetal pole  - Patient educated on findings and subsequent diagnosis of failed IUP. Pt counseled with management options including medical management vs surgical management; risks and benefits of each explained. Pt opting for surgical management with D&C, requesting the procedure as soon as possible  - Will try to add pt to OR schedule for  or   - Return precautions reviewed    Patient seen at bedside with GYN chief resident, Dr. Null PGY4  Case and plan discussed with GYN attending, Dr. Breana Flores PGY1

## 2022-12-16 NOTE — ED ADULT NURSE NOTE - OBJECTIVE STATEMENT
received pt in intake room 15, 37 yr/o female A+OX4, ambulatory at baseline. patient is currently 9 wks pregnant,  she was told an empty sack. pt presented to the ED C/O abdominal pain with scan vaginal bleeding. abdomen appears flats, soft, nontender, pt denies N/V/D/C.

## 2022-12-16 NOTE — ED PROVIDER NOTE - PATIENT PORTAL LINK FT
You can access the FollowMyHealth Patient Portal offered by [facility] by registering at the following website: [email or web address]. By joining [facility]’s FollowMyHealth portal, you will also be able to view your health information using other applications (apps) compatible with our system.

## 2022-12-16 NOTE — ED PROVIDER NOTE - OBJECTIVE STATEMENT
37 year old female  currently pregnant approx 9 week,s but has been told she has an empty sack presents with 1 day of abd pain and scant vaginal bleeding. no n/v. no fever. complains of weakness and dizziness.  no chest pain or sob

## 2022-12-16 NOTE — ED ADULT NURSE NOTE - NSIMPLEMENTINTERV_GEN_ALL_ED
Implemented All Universal Safety Interventions:  [place] to call system. Call bell, personal items and telephone within reach. Instruct patient to call for assistance. Room bathroom lighting operational. Non-slip footwear when patient is off stretcher. Physically safe environment: no spills, clutter or unnecessary equipment. Stretcher in lowest position, wheels locked, appropriate side rails in place.

## 2022-12-16 NOTE — ED PROVIDER NOTE - NSFOLLOWUPINSTRUCTIONS_ED_ALL_ED_FT
No signs of emergency medical condition on today's workup.  Presumptive diagnosis made, but further evaluation may be required by your primary care doctor or specialist for a definitive diagnosis.  Therefore, follow up as directed and if symptoms change/worsen or any emergency conditions, please return to the ER.  Please follow up with your OB doctor for further treatment after you leave the emergency department so that they can follow up and conduct more testing and treatment as they deem necessary. If you have worsening signs or symptoms of what you came in to the Emergency Department today and are not able to see your doctor, go to your nearest emergency department or return to the [facility] emergency department for further care and management.

## 2022-12-16 NOTE — CONSULT NOTE ADULT - SUBJECTIVE AND OBJECTIVE BOX
Patient is a 38y/o  LMP 10/14 presenting to the ED for follow up beta HCG and officiall TVUS. Patient has been followed in [facility] GYN clinic for serial bHCGs and TVUS, both bedside and official due to uptrending bHCG without ultrasound evidence of a yolk sac or fetal pole.    Name of GYN Physician: [facility] clinic  OBHx: 2 SAB in  & 2017   GynHx: Denies fibroids, cysts, endometriosis, STI's, Abnormal pap smears   PMH: None  PSH: D&C   Meds: None  Allx: None  Social History:  Denies smoking use, drug use, alcohol use.    Vital Signs Last 24 Hrs  T(C): 36.7 (16 Dec 2022 10:37), Max: 36.7 (16 Dec 2022 10:37)  T(F): 98 (16 Dec 2022 10:37), Max: 98 (16 Dec 2022 10:37)  HR: 92 (16 Dec 2022 10:37) (92 - 92)  BP: 122/69 (16 Dec 2022 10:37) (122/69 - 122/69)  RR: 16 (16 Dec 2022 10:37) (16 - 16)  SpO2: 100% (16 Dec 2022 10:37) (100% - 100%)    Parameters below as of 16 Dec 2022 10:37  Patient On (Oxygen Delivery Method): room air    Physical Exam:   General: sitting comfortably in bed, NAD   HEENT: neck supple, full ROM  CV: RRR  Lungs: CTA b/l, good air flow b/l   Back: No CVA tenderness  Abd: Soft, non-tender, non-distended.  Bowel sounds present.    :  No bleeding on pad.    External labia wnl.  Bimanual exam with no cervical motion tenderness, uterus wnl, adnexa non palpable b/l.  Cervix closed vs. Cervix dilated  Speculum Exam: No active bleeding from os.  Physiologic discharge.    Ext: non-tender b/l, no edema     LABS:                              12.7   10.36 )-----------( 403      ( 16 Dec 2022 13:20 )             40.2     12-16    132<L>  |  99  |  9   ----------------------------<  83  4.9   |  22  |  0.38<L>    Ca    9.2      16 Dec 2022 13:20    TPro  7.5  /  Alb  4.0  /  TBili  0.3  /  DBili  x   /  AST  24  /  ALT  13  /  AlkPhos  76  12-16    I&O's Detail          RADIOLOGY & ADDITIONAL STUDIES:      Patient is a 38y/o  LMP 10/14 presenting to the ED for follow up beta HCG and officiall TVUS. Patient has been followed in [facility] GYN clinic for serial bHCGs and TVUS, both bedside and official due to uptrending bHCG without ultrasound evidence of a yolk sac or fetal pole. Patient feeling well today, denies abdominal/pelvic pain or vaginal bleeding.     Name of GYN Physician: [facility] clinic  OBHx: 2 SAB in  & 2017   GynHx: Denies fibroids, cysts, endometriosis, STI's, Abnormal pap smears   PMH: None  PSH: D&C   Meds: None  Allx: None  Social History:  Denies smoking use, drug use, alcohol use.    Vital Signs Last 24 Hrs  T(C): 36.7 (16 Dec 2022 10:37), Max: 36.7 (16 Dec 2022 10:37)  T(F): 98 (16 Dec 2022 10:37), Max: 98 (16 Dec 2022 10:37)  HR: 92 (16 Dec 2022 10:37) (92 - 92)  BP: 122/69 (16 Dec 2022 10:37) (122/69 - 122/69)  RR: 16 (16 Dec 2022 10:37) (16 - 16)  SpO2: 100% (16 Dec 2022 10:37) (100% - 100%)    Parameters below as of 16 Dec 2022 10:37  Patient On (Oxygen Delivery Method): room air    Physical Exam:   General: laying comfortably in bed, NAD   CV: extremities well perfused  Lungs: normal respiratory effort on room air  : deferred, patient denying vaginal bleeding    LABS:                        12.7   10.36 )-----------( 403      ( 16 Dec 2022 13:20 )             40.2     12-    132<L>  |  99  |  9   ----------------------------<  83  4.9   |  22  |  0.38<L>    Ca    9.2      16 Dec 2022 13:20    TPro  7.5  /  Alb  4.0  /  TBili  0.3  /  DBili  x   /  AST  24  /  ALT  13  /  AlkPhos  76  12-16    Imaging:    < from: US Transvaginal (. @ 12:45) >  ACC: [id number] EXAM:  US TRANSVAGINAL                          PROCEDURE DATE:  2022      INTERPRETATION:  CLINICAL INFORMATION: Pregnant. Right pelvic pain.   Vaginal spotting    LMP: 10/14/2022 Estimated Gestational Age by LMP: 9 weeks 0days    COMPARISON: Pelvic ultrasound 2022 and 2022    Endovaginal pelvic sonogram as per order. Transabdominal pelvic sonogram   was also performed as part of our standard protocol.    FINDINGS:  Uterus: Contains an intrauterine gestational sac. Fetal pole and yolk sac   are not visualized    Gestational Sac Size (mean): 1.9 cm correlates with estimated gestational   age of 6 weeks 6 days    Right ovary: 4.0 cm x 2.6 cm x 2.8 cm, seen transabdominally only. Within   normal limits. Normal arterial waveforms.  Left ovary: 3.4 cm x 2.6 cm x 2.1 cm. . Contains corpus luteum Normal   arterial waveforms.    Fluid: None.    IMPRESSION:  Early intrauterine gestation with estimated gestational age of 6 weeks 6   days by mean sac diameter without yolk sac or fetal pole. Findings are   suspicious however not diagnostic for pregnancy failure. Recommend   follow-up pelvic ultrasound in one week to reassess viability and dating    --- End of Report ---    ZE RED MD; Attending Radiologist  This document has been electronically signed. Dec 16 2022  1:47PM    < end of copied text >      Patient is a 38y/o  LMP 10/14 presenting to the ED for follow up beta HCG and official TVUS. Patient has been followed in [facility] GYN clinic for serial bHCGs and TVUS, both bedside and official due to uptrending bHCG without ultrasound evidence of a yolk sac or fetal pole. Patient feeling well today, denies abdominal/pelvic pain or vaginal bleeding.     Name of GYN Physician: [facility] clinic  OBHx: 2 SAB in  & 2017   GynHx: Denies fibroids, cysts, endometriosis, STI's, Abnormal pap smears   PMH: None  PSH: D&C   Meds: None  Allx: None  Social History:  Denies smoking use, drug use, alcohol use.    Vital Signs Last 24 Hrs  T(C): 36.7 (16 Dec 2022 10:37), Max: 36.7 (16 Dec 2022 10:37)  T(F): 98 (16 Dec 2022 10:37), Max: 98 (16 Dec 2022 10:37)  HR: 92 (16 Dec 2022 10:37) (92 - 92)  BP: 122/69 (16 Dec 2022 10:37) (122/69 - 122/69)  RR: 16 (16 Dec 2022 10:37) (16 - 16)  SpO2: 100% (16 Dec 2022 10:37) (100% - 100%)    Parameters below as of 16 Dec 2022 10:37  Patient On (Oxygen Delivery Method): room air    Physical Exam:   General: laying comfortably in bed, NAD   CV: extremities well perfused  Lungs: normal respiratory effort on room air  : deferred, patient denying vaginal bleeding    LABS:                        12.7   10.36 )-----------( 403      ( 16 Dec 2022 13:20 )             40.2     12-16    132<L>  |  99  |  9   ----------------------------<  83  4.9   |  22  |  0.38<L>    Ca    9.2      16 Dec 2022 13:20    TPro  7.5  /  Alb  4.0  /  TBili  0.3  /  DBili  x   /  AST  24  /  ALT  13  /  AlkPhos  76  12-16    Imaging:    < from: US Transvaginal (22 @ 12:45) >  ACC: [id number] EXAM:  US TRANSVAGINAL                          PROCEDURE DATE:  2022      INTERPRETATION:  CLINICAL INFORMATION: Pregnant. Right pelvic pain.   Vaginal spotting    LMP: 10/14/2022 Estimated Gestational Age by LMP: 9 weeks 0days    COMPARISON: Pelvic ultrasound 2022 and 2022    Endovaginal pelvic sonogram as per order. Transabdominal pelvic sonogram   was also performed as part of our standard protocol.    FINDINGS:  Uterus: Contains an intrauterine gestational sac. Fetal pole and yolk sac   are not visualized    Gestational Sac Size (mean): 1.9 cm correlates with estimated gestational   age of 6 weeks 6 days    Right ovary: 4.0 cm x 2.6 cm x 2.8 cm, seen transabdominally only. Within   normal limits. Normal arterial waveforms.  Left ovary: 3.4 cm x 2.6 cm x 2.1 cm. . Contains corpus luteum Normal   arterial waveforms.    Fluid: None.    IMPRESSION:  Early intrauterine gestation with estimated gestational age of 6 weeks 6   days by mean sac diameter without yolk sac or fetal pole. Findings are   suspicious however not diagnostic for pregnancy failure. Recommend   follow-up pelvic ultrasound in one week to reassess viability and dating    --- End of Report ---    ZE RED MD; Attending Radiologist  This document has been electronically signed. Dec 16 2022  1:47PM    < end of copied text >

## 2022-12-19 ENCOUNTER — APPOINTMENT (OUTPATIENT)
Dept: OBGYN | Facility: HOSPITAL | Age: 37
End: 2022-12-19

## 2022-12-19 ENCOUNTER — TRANSCRIPTION ENCOUNTER (OUTPATIENT)
Age: 37
End: 2022-12-19

## 2022-12-20 ENCOUNTER — TRANSCRIPTION ENCOUNTER (OUTPATIENT)
Age: 37
End: 2022-12-20

## 2022-12-20 ENCOUNTER — OUTPATIENT (OUTPATIENT)
Dept: OUTPATIENT SERVICES | Facility: HOSPITAL | Age: 37
LOS: 1 days | Discharge: HOME | End: 2022-12-20
Payer: MEDICAID

## 2022-12-20 ENCOUNTER — RESULT REVIEW (OUTPATIENT)
Age: 37
End: 2022-12-20

## 2022-12-20 VITALS
TEMPERATURE: 98 F | OXYGEN SATURATION: 100 % | HEART RATE: 78 BPM | RESPIRATION RATE: 16 BRPM | DIASTOLIC BLOOD PRESSURE: 71 MMHG | SYSTOLIC BLOOD PRESSURE: 96 MMHG | WEIGHT: 207.9 LBS | HEIGHT: 62 IN

## 2022-12-20 VITALS
RESPIRATION RATE: 18 BRPM | OXYGEN SATURATION: 97 % | TEMPERATURE: 98 F | HEART RATE: 89 BPM | DIASTOLIC BLOOD PRESSURE: 61 MMHG | SYSTOLIC BLOOD PRESSURE: 115 MMHG

## 2022-12-20 DIAGNOSIS — O02.1 MISSED ABORTION: ICD-10-CM

## 2022-12-20 DIAGNOSIS — Z98.890 OTHER SPECIFIED POSTPROCEDURAL STATES: Chronic | ICD-10-CM

## 2022-12-20 LAB
BLD GP AB SCN SERPL QL: NEGATIVE — SIGNIFICANT CHANGE UP
RH IG SCN BLD-IMP: POSITIVE — SIGNIFICANT CHANGE UP

## 2022-12-20 PROCEDURE — 88305 TISSUE EXAM BY PATHOLOGIST: CPT | Mod: 26

## 2022-12-20 PROCEDURE — 99214 OFFICE O/P EST MOD 30 MIN: CPT | Mod: TH,24

## 2022-12-20 PROCEDURE — 59820 CARE OF MISCARRIAGE: CPT | Mod: GC

## 2022-12-20 RX ORDER — FENTANYL CITRATE 50 UG/ML
50 INJECTION INTRAVENOUS ONCE
Refills: 0 | Status: DISCONTINUED | OUTPATIENT
Start: 2022-12-20 | End: 2022-12-20

## 2022-12-20 RX ORDER — ONDANSETRON 8 MG/1
4 TABLET, FILM COATED ORAL ONCE
Refills: 0 | Status: COMPLETED | OUTPATIENT
Start: 2022-12-20 | End: 2022-12-20

## 2022-12-20 RX ORDER — FENTANYL CITRATE 50 UG/ML
25 INJECTION INTRAVENOUS ONCE
Refills: 0 | Status: DISCONTINUED | OUTPATIENT
Start: 2022-12-20 | End: 2022-12-20

## 2022-12-20 RX ADMIN — FENTANYL CITRATE 50 MICROGRAM(S): 50 INJECTION INTRAVENOUS at 12:30

## 2022-12-20 RX ADMIN — ONDANSETRON 4 MILLIGRAM(S): 8 TABLET, FILM COATED ORAL at 11:54

## 2022-12-20 RX ADMIN — FENTANYL CITRATE 50 MICROGRAM(S): 50 INJECTION INTRAVENOUS at 11:54

## 2022-12-20 NOTE — BRIEF OPERATIVE NOTE - OPERATION/FINDINGS
Pelvic exam under anesthesia revealed an 8 week sized anteverted uterus. Vaginal and cervix appeared normal. Ultrasound guidance showed single intrauterine gestational sac without yolk sack or fetal pole. Vacuum aspiration performed with visible products of conception removed. Post procedure ultrasound revealing a thin endometrial stripe without retained products of conception. Pelvic exam under anesthesia revealed an 8 week sized anteverted uterus. Vagina and cervix appeared normal. Ultrasound guidance showed single intrauterine gestational sac without yolk sack or fetal pole. Vacuum aspiration performed with visible products of conception removed. Post procedure ultrasound revealing a thin endometrial stripe without retained products of conception.

## 2022-12-20 NOTE — ASU DISCHARGE PLAN (ADULT/PEDIATRIC) - ASU DC SPECIAL INSTRUCTIONSFT
Regular diet as tolerated, regular activity as tolerated, no heavy lifting for first two weeks.  Nothing per vagina: no intercourse, tampons or douching.  Call your provider if you experience fevers, chills, worsening abdominal pain, inability to urinate or worsening vaginal bleeding.  Follow up with [facility] in 2 weeks.

## 2022-12-20 NOTE — ASU DISCHARGE PLAN (ADULT/PEDIATRIC) - CARE PROVIDER_API CALL
[facility],   Oncology Building, [facility]  [address] [place] [id number]  [phone]  Phone: [phone]  Fax: (   )    -  Follow Up Time:

## 2022-12-20 NOTE — ASU DISCHARGE PLAN (ADULT/PEDIATRIC) - NURSING INSTRUCTIONS
DO NOT take any Tylenol (Acetaminophen) or narcotics containing Tylenol until after  5pm . You received Tylenol during your operation and it can cause damage to your liver if too much is taken within a 24 hour time period.  No Ibuprofen until after 5pm.

## 2022-12-20 NOTE — BRIEF OPERATIVE NOTE - NSICDXBRIEFPROCEDURE_GEN_ALL_CORE_FT
PROCEDURES:   of products of conception using vacuum aspiration 20-Dec-2022 11:26:43  Krzysztof Flores

## 2022-12-20 NOTE — ASU DISCHARGE PLAN (ADULT/PEDIATRIC) - WILL THE PATIENT ACCEPT THE PFIZER COVID-19 VACCINE IF ELIGIBLE AND IT IS AVAILABLE?
Subjective:   Patient ID: Declan Rossi is a 40 y.o. female here today to establish care. HPI by clinical support staff:   Chief Complaint   Patient presents with   Alisa Curl Establish Care     stomach pain into back not on one side      Preliminary data above this line collected by clinical support staff.    ______________________________________________________________________  HPI by Provider:   HPI   Patient presents with  and daughter- establishing care- [place] due to  job. Reports dizziness when she bends over or lays down. Decribes it as the room spinning. Symptoms x 1 month- daily. Also has epigastric pain worse with spicy foods and pain radiates to both sides of her upper back. No constipation, diarrhea, mucus or blood in stool. Has history of h pylori- not taken anything yet. Data above this line collected by Provider. Patient's medications, allergies, past medical, surgical, social and family histories were reviewed and updated as appropriate. Patient Care Team:  Osmin Pepe MD as PCP - General (Family Medicine)    No Known Allergies  No current outpatient medications on file prior to visit. No current facility-administered medications on file prior to visit. Review of Systems   Constitutional: Negative for activity change, appetite change, fatigue and fever. HENT: Negative for congestion, rhinorrhea and sore throat. Respiratory: Negative for chest tightness and shortness of breath. Cardiovascular: Negative for chest pain, palpitations and leg swelling. Gastrointestinal: Positive for abdominal pain. Negative for blood in stool, constipation, diarrhea, nausea and vomiting. Genitourinary: Negative for dysuria and frequency. Musculoskeletal: Negative for arthralgias and myalgias. Neurological: Positive for dizziness. Negative for weakness and headaches. Psychiatric/Behavioral: Negative for hallucinations and sleep disturbance.  The patient is not
No

## 2022-12-20 NOTE — ASU DISCHARGE PLAN (ADULT/PEDIATRIC) - NS MD DC FALL RISK RISK
For information on Fall & Injury Prevention, visit: [email or web address] OR  [email or web address] OR  https://www.cdc.gov/steadi/patient.html

## 2022-12-20 NOTE — H&P ADULT - NSHPPHYSICALEXAM_GEN_ALL_CORE
General: sitting comfortably in bed, NAD   HEENT: neck supple, full ROM  CV: RRR  Lungs: CTA b/l, good air flow b/l   Back: No CVA tenderness  Abd: Soft, non-tender, non-distended.  Bowel sounds present.

## 2022-12-20 NOTE — ASU DISCHARGE PLAN (ADULT/PEDIATRIC) - PROVIDER TOKENS
FREE:[LAST:[[facility]],PHONE:[phone]],FAX:[(   )    -],ADDRESS:[[place] [address]  [phone]]]

## 2022-12-20 NOTE — H&P ADULT - HISTORY OF PRESENT ILLNESS
MICHELL YEPEZ  37y  Female [id number]    Patient is a 36y/o  LMP 10/14 presenting for Dilation and Vacuum Curettage s/p failed intrauterine pregnancy. Patient has been followed in [facility] GYN clinic and ED since  with original complaint of syncope and was incidentally found to be pregnant. Patient has been following with [facility] GYN clinic and ED with uptrending bHCG and TVUS continuing to show a gestational sac without yolk sac or fetal pole. Gestational sac was initially visualized on TVUS on . Patient therefore meeting criteria for failed intrauterine pregnancy based on absence of embryo with heartbeat >2 weeks since first visualization of gestational sac. Patient states she is no longer experiencing vaginal spotting. She denies abdominal pain, chest pain, shortness of breath, dysuria, changes in bowel habits, nausea or vomiting.     Name of GYN Physician: [facility] clinic  OBHx: 2 SAB in  & 2017   GynHx: Denies fibroids, cysts, endometriosis, STI's, Abnormal pap smears   PMH: None  PSH: D&C   Meds: None  Allx: None  Social History:  Denies smoking use, drug use, alcohol use.       MICHELL YEPEZ  37y  Female [id number]    Patient is a 36y/o  LMP 10/14 presenting for Dilation and Vacuum Curettage s/p failed intrauterine pregnancy. Patient initially seen  with original complaint of syncope and was incidentally found to be pregnant. Patient has been following with [facility] GYN clinic and ED with uptrending bHCG and TVUS continuing to show a gestational sac without yolk sac or fetal pole. Gestational sac was initially visualized on TVUS on . Patient therefore meeting criteria for failed intrauterine pregnancy based on absence of embryo with heartbeat >2 weeks since first visualization of gestational sac. Patient states she is no longer experiencing vaginal spotting. She denies abdominal pain, chest pain, shortness of breath, dysuria, changes in bowel habits, nausea or vomiting.     Name of GYN Physician: [facility] clinic  OBHx: 2 SAB in  & 2017   GynHx: Denies fibroids, cysts, endometriosis, STI's, Abnormal pap smears   PMH: None  PSH: D&C   Meds: None  Allx: None  Social History:  Denies smoking use, drug use, alcohol use.

## 2022-12-27 LAB — SURGICAL PATHOLOGY STUDY: SIGNIFICANT CHANGE UP

## 2022-12-28 ENCOUNTER — NON-APPOINTMENT (OUTPATIENT)
Age: 37
End: 2022-12-28

## 2022-12-30 NOTE — CHART NOTE - NSCHARTNOTEFT_GEN_A_CORE
Called patient to discuss results of pathology. Voicemail box not set up, unable to leave message. Patient has follow up appointment in [facility] on 1/3, will discuss results at appointment.    MITCHELL Shahid
Called and spoke with patient regarding bHCG follow up. Patient was originally scheduled for appointment for repeat bHCG and TVUS on Monday 12/19 but we have requested that the patient report to the Emergency Room today 12/16 to have her testing done earlier. Patient states she will report today. She was advised to have the ED team call GYN if she has any difficulties or to call clinic with any questions.    MITCHELL Shahid  d/w GYN team

## 2023-01-04 ENCOUNTER — OUTPATIENT (OUTPATIENT)
Dept: OUTPATIENT SERVICES | Facility: HOSPITAL | Age: 38
LOS: 1 days | End: 2023-01-04

## 2023-01-04 ENCOUNTER — APPOINTMENT (OUTPATIENT)
Dept: OBGYN | Facility: HOSPITAL | Age: 38
End: 2023-01-04
Payer: MEDICAID

## 2023-01-04 VITALS
DIASTOLIC BLOOD PRESSURE: 79 MMHG | TEMPERATURE: 97.9 F | SYSTOLIC BLOOD PRESSURE: 129 MMHG | BODY MASS INDEX: 38.68 KG/M2 | HEART RATE: 108 BPM | WEIGHT: 210.2 LBS | HEIGHT: 62 IN

## 2023-01-04 DIAGNOSIS — Z98.890 OTHER SPECIFIED POSTPROCEDURAL STATES: Chronic | ICD-10-CM

## 2023-01-04 PROCEDURE — 99212 OFFICE O/P EST SF 10 MIN: CPT | Mod: GC,24

## 2023-01-04 PROCEDURE — 99024 POSTOP FOLLOW-UP VISIT: CPT

## 2023-01-05 DIAGNOSIS — Z30.011 ENCOUNTER FOR INITIAL PRESCRIPTION OF CONTRACEPTIVE PILLS: ICD-10-CM

## 2023-01-15 NOTE — PLAN
[None] : None [FreeTextEntry1] : #Postop\par - pt doing well, asymptomatic, physical exam unremarkable\par - pathology reviewed,  products of conception and chorionic villi, consistent with failed IUP\par \par #Contraception\par - pt counseled on all forms of contraception and desires OCPs, risks/benefits/alternatives explained to pt \par - Sprintec RX sent to preferred pharmacy\par \par #Wellbeing\par - pt grieving following pregnancy loss\par - pt seen by social work\par \par pt seen and counseled with Dr. Friend, JANEE fellow\par Pt discussed w/ Dr. Toussaint, attending\par \par LAURITA Whitehead MD\par OBGYN PGY4  \par \par MIGS Fellow Addendum\par 36yo  presenting POD15 for postop check s/p uncomplicated suction D&C secondary to mab. Patient reports no postop complication and denies significant bleeding or pain/cramping post op. Detailed conversation with patient regarding clinical course, as patient stated she was confused and upset about evaluation process of mab prior to procedure. Discussed that course of evaluation was appropriate given her clinical picture and that performing preemptive treatment before definitive diagnosis for a mab, especially in a desired pregnancy, would have been inappropriate. Many questions and concerns addressed. Patient also stated that she desires BC with appropriate planning and preconception counseling in the future. Discussed all forms of BC, including LARCS, along with r/b/a. Patient desires OCPs with follow up and possible change to LARC if pregnancy no longer desired. Patient denies contraindications to OCPs, including migraine with aura, h/o DVT/PE, HTN. Liver Dx, etc. Pathology reviewed with chorionic villi noted. All questions and concerns addressed.\par D/W Dr. Toussaint\jorge alberto Friend, FMIGS-1

## 2023-01-15 NOTE — ASSESSMENT
[Doing Well] : is doing well [No Sign of Infection] : is showing no signs of infection [de-identified] : Ms. Garcia is a 38y/o  presenting for her postop check following dilation and vacuum curettage for SAB who is doing well  physically and asymptomatic although continues to experience grief from her loss. Pt does not desire conception at this time and would like OCPs for contraception.

## 2023-01-15 NOTE — HISTORY OF PRESENT ILLNESS
[None] : no vaginal bleeding [Pathology reviewed] : pathology reviewed [Chills] : no chills [Nausea] : no nausea [Vomiting] : no vomiting [Vaginal Bleeding] : no vaginal bleeding [Pelvic Pressure] : no pelvic pressure [Dysuria] : no dysuria [Vaginal Discharge] : no vaginal discharge [Constipation] : no constipation [de-identified] : 15 [de-identified] : dilation and vacuum curettage [de-identified] : SAB [de-identified] : Ms. Garcia is a 38y/o  presenting for her postop check following dilation and vacuum curettage performed for a failed intrauterine pregnancy (persistent TVUS findings showing a gestational sac without yolk sac or fetal pole for > 2 weeks). The procedure was uncomplicated and pathology results showed products of conception and chorionic villi, consistent with failed IUP.\par \par  [de-identified] : Pt reports she is still sad following this loss. She does not desire to attempt to conceive at this moment and desires OCPs.  [de-identified] : benign abdominal exam

## 2023-02-13 NOTE — ED ADULT NURSE NOTE - IS THE PATIENT ABLE TO BE SCREENED?
Yes Xerosis Aggressive Treatment: I recommended application of Cetaphil or CeraVe numerous times a day going to bed to all dry areas. I also prescribed a topical steroid for twice daily use.

## 2023-08-15 ENCOUNTER — APPOINTMENT (OUTPATIENT)
Dept: OBGYN | Facility: HOSPITAL | Age: 38
End: 2023-08-15

## 2023-08-15 ENCOUNTER — OUTPATIENT (OUTPATIENT)
Dept: OUTPATIENT SERVICES | Facility: HOSPITAL | Age: 38
LOS: 1 days | End: 2023-08-15

## 2023-08-15 DIAGNOSIS — Z32.00 ENCOUNTER FOR PREGNANCY TEST, RESULT UNKNOWN: ICD-10-CM

## 2023-08-15 LAB
HCG UR QL: POSITIVE
QUALITY CONTROL: YES

## 2023-08-24 ENCOUNTER — EMERGENCY (EMERGENCY)
Facility: HOSPITAL | Age: 38
LOS: 1 days | Discharge: HOME | End: 2023-08-24
Admitting: EMERGENCY MEDICINE
Payer: MEDICAID

## 2023-08-24 VITALS
OXYGEN SATURATION: 98 % | DIASTOLIC BLOOD PRESSURE: 77 MMHG | RESPIRATION RATE: 18 BRPM | SYSTOLIC BLOOD PRESSURE: 132 MMHG | HEART RATE: 99 BPM | TEMPERATURE: 98 F

## 2023-08-24 DIAGNOSIS — Z98.890 OTHER SPECIFIED POSTPROCEDURAL STATES: Chronic | ICD-10-CM

## 2023-08-24 LAB
ALBUMIN SERPL ELPH-MCNC: 4.3 G/DL — SIGNIFICANT CHANGE UP (ref 3.3–5)
ALP SERPL-CCNC: 85 U/L — SIGNIFICANT CHANGE UP (ref 40–120)
ALT FLD-CCNC: 20 U/L — SIGNIFICANT CHANGE UP (ref 4–33)
ANION GAP SERPL CALC-SCNC: 12 MMOL/L — SIGNIFICANT CHANGE UP (ref 7–14)
APPEARANCE UR: CLEAR — SIGNIFICANT CHANGE UP
AST SERPL-CCNC: 21 U/L — SIGNIFICANT CHANGE UP (ref 4–32)
BACTERIA # UR AUTO: ABNORMAL /HPF
BASOPHILS # BLD AUTO: 0.05 K/UL — SIGNIFICANT CHANGE UP (ref 0–0.2)
BASOPHILS NFR BLD AUTO: 0.5 % — SIGNIFICANT CHANGE UP (ref 0–2)
BILIRUB SERPL-MCNC: <0.2 MG/DL — SIGNIFICANT CHANGE UP (ref 0.2–1.2)
BILIRUB UR-MCNC: NEGATIVE — SIGNIFICANT CHANGE UP
BLD GP AB SCN SERPL QL: NEGATIVE — SIGNIFICANT CHANGE UP
BUN SERPL-MCNC: 8 MG/DL — SIGNIFICANT CHANGE UP (ref 7–23)
CALCIUM SERPL-MCNC: 9 MG/DL — SIGNIFICANT CHANGE UP (ref 8.4–10.5)
CAST: 1 /LPF — SIGNIFICANT CHANGE UP (ref 0–4)
CHLORIDE SERPL-SCNC: 102 MMOL/L — SIGNIFICANT CHANGE UP (ref 98–107)
CO2 SERPL-SCNC: 21 MMOL/L — LOW (ref 22–31)
COLOR SPEC: YELLOW — SIGNIFICANT CHANGE UP
CREAT SERPL-MCNC: 0.41 MG/DL — LOW (ref 0.5–1.3)
DIFF PNL FLD: ABNORMAL
EGFR: 129 ML/MIN/1.73M2 — SIGNIFICANT CHANGE UP
EOSINOPHIL # BLD AUTO: 0.09 K/UL — SIGNIFICANT CHANGE UP (ref 0–0.5)
EOSINOPHIL NFR BLD AUTO: 0.9 % — SIGNIFICANT CHANGE UP (ref 0–6)
GLUCOSE SERPL-MCNC: 115 MG/DL — HIGH (ref 70–99)
GLUCOSE UR QL: NEGATIVE MG/DL — SIGNIFICANT CHANGE UP
HCG SERPL-ACNC: SIGNIFICANT CHANGE UP MIU/ML
HCT VFR BLD CALC: 39.9 % — SIGNIFICANT CHANGE UP (ref 34.5–45)
HGB BLD-MCNC: 12.8 G/DL — SIGNIFICANT CHANGE UP (ref 11.5–15.5)
IANC: 7.64 K/UL — HIGH (ref 1.8–7.4)
IMM GRANULOCYTES NFR BLD AUTO: 0.4 % — SIGNIFICANT CHANGE UP (ref 0–0.9)
KETONES UR-MCNC: NEGATIVE MG/DL — SIGNIFICANT CHANGE UP
LEUKOCYTE ESTERASE UR-ACNC: ABNORMAL
LYMPHOCYTES # BLD AUTO: 1.86 K/UL — SIGNIFICANT CHANGE UP (ref 1–3.3)
LYMPHOCYTES # BLD AUTO: 18.1 % — SIGNIFICANT CHANGE UP (ref 13–44)
MCHC RBC-ENTMCNC: 26.4 PG — LOW (ref 27–34)
MCHC RBC-ENTMCNC: 32.1 GM/DL — SIGNIFICANT CHANGE UP (ref 32–36)
MCV RBC AUTO: 82.4 FL — SIGNIFICANT CHANGE UP (ref 80–100)
MONOCYTES # BLD AUTO: 0.6 K/UL — SIGNIFICANT CHANGE UP (ref 0–0.9)
MONOCYTES NFR BLD AUTO: 5.8 % — SIGNIFICANT CHANGE UP (ref 2–14)
NEUTROPHILS # BLD AUTO: 7.64 K/UL — HIGH (ref 1.8–7.4)
NEUTROPHILS NFR BLD AUTO: 74.3 % — SIGNIFICANT CHANGE UP (ref 43–77)
NITRITE UR-MCNC: NEGATIVE — SIGNIFICANT CHANGE UP
NRBC # BLD: 0 /100 WBCS — SIGNIFICANT CHANGE UP (ref 0–0)
NRBC # FLD: 0 K/UL — SIGNIFICANT CHANGE UP (ref 0–0)
PH UR: 7 — SIGNIFICANT CHANGE UP (ref 5–8)
PLATELET # BLD AUTO: 368 K/UL — SIGNIFICANT CHANGE UP (ref 150–400)
POTASSIUM SERPL-MCNC: 4.3 MMOL/L — SIGNIFICANT CHANGE UP (ref 3.5–5.3)
POTASSIUM SERPL-SCNC: 4.3 MMOL/L — SIGNIFICANT CHANGE UP (ref 3.5–5.3)
PROT SERPL-MCNC: 7.8 G/DL — SIGNIFICANT CHANGE UP (ref 6–8.3)
PROT UR-MCNC: NEGATIVE MG/DL — SIGNIFICANT CHANGE UP
RBC # BLD: 4.84 M/UL — SIGNIFICANT CHANGE UP (ref 3.8–5.2)
RBC # FLD: 14.7 % — HIGH (ref 10.3–14.5)
RBC CASTS # UR COMP ASSIST: 3 /HPF — SIGNIFICANT CHANGE UP (ref 0–4)
REVIEW: SIGNIFICANT CHANGE UP
RH IG SCN BLD-IMP: POSITIVE — SIGNIFICANT CHANGE UP
SODIUM SERPL-SCNC: 135 MMOL/L — SIGNIFICANT CHANGE UP (ref 135–145)
SP GR SPEC: 1.01 — SIGNIFICANT CHANGE UP (ref 1–1.03)
SQUAMOUS # UR AUTO: 4 /HPF — SIGNIFICANT CHANGE UP (ref 0–5)
UROBILINOGEN FLD QL: 0.2 MG/DL — SIGNIFICANT CHANGE UP (ref 0.2–1)
WBC # BLD: 10.28 K/UL — SIGNIFICANT CHANGE UP (ref 3.8–10.5)
WBC # FLD AUTO: 10.28 K/UL — SIGNIFICANT CHANGE UP (ref 3.8–10.5)
WBC UR QL: 4 /HPF — SIGNIFICANT CHANGE UP (ref 0–5)

## 2023-08-24 PROCEDURE — 76817 TRANSVAGINAL US OBSTETRIC: CPT | Mod: 26

## 2023-08-24 PROCEDURE — 99284 EMERGENCY DEPT VISIT MOD MDM: CPT

## 2023-08-24 NOTE — ED ADULT NURSE NOTE - NSFALLUNIVINTERV_ED_ALL_ED
Bed/Stretcher in lowest position, wheels locked, appropriate side rails in place/Call bell, personal items and telephone in reach/Instruct patient to call for assistance before getting out of bed/chair/stretcher/Non-slip footwear applied when patient is off stretcher/[place] to call system/Physically safe environment - no spills, clutter or unnecessary equipment/Purposeful proactive rounding/Room/bathroom lighting operational, light cord in reach

## 2023-08-24 NOTE — ED PROVIDER NOTE - PATIENT PORTAL LINK FT
You can access the FollowMyHealth Patient Portal offered by [facility] by registering at the following website: [email or web address]. By joining [employer]’s FollowMyHealth portal, you will also be able to view your health information using other applications (apps) compatible with our system.

## 2023-08-24 NOTE — ED PROVIDER NOTE - NS_EDPROVIDERDISPOUSERTYPE_ED_A_ED
Return to work    3/13/2019      RE:    Nacho Carlson   [address]  [facility] [address]      This is to certify that Nacho was seen in the clinic today and can return to regular work on 3/13/19.    Restrictions: none        Signature: __________________________________________________, 3/13/2019              Uri Gomez MD  [facility] [address]  [place] [id number] [phone]     I have personally evaluated and examined the patient. The Attending was available to me as a supervising provider if needed.

## 2023-08-24 NOTE — ED PROVIDER NOTE - NSFOLLOWUPINSTRUCTIONS_ED_ALL_ED_FT
aginal Bleeding During Pregnancy, First Trimester  A small amount of bleeding from the vagina, or spotting, is common during early pregnancy. Some bleeding may be related to the pregnancy, and some may not. In many cases, the bleeding is normal and is not a problem. However, bleeding can also be a sign of something serious.    Normal things that may cause bleeding during the first trimester:  Implantation of the fertilized egg in the lining of the uterus.  Rapid changes in blood vessels. This is caused by changes that are happening to the body during pregnancy.  Sex.  Pelvic exams.  Abnormal things that may cause bleeding during the first trimester include:  Infection or inflammation of the cervix.  Growths or polyps on the cervix.  Miscarriage or threatened miscarriage.  Pregnancy that is growing outside of the uterus (ectopic pregnancy).  A fertilized egg that becomes a mass of tissue (molar pregnancy).  Tell your health care provider right away if there is any bleeding from your vagina.    Follow these instructions at home:  Monitoring your bleeding      Monitor your bleeding.  Pay attention to any changes in your symptoms. Let your health care provider know about any concerns.  Try to understand when the bleeding occurs. Does the bleeding start on its own, or does it start after something is done, such as sex or a pelvic exam?  Use a diary to record the things you see about your bleeding, including:  The kind of bleeding you are having. Does the bleeding start and stop irregularly, or is it a constant flow?  The severity of your bleeding. Is the bleeding heavy or light?  The number of pads you use each day, how often you change them, and how soaked they are.  Tell your health care provider if you pass tissue. He or she may want to see it.  Activity    Follow instructions from your health care provider about limiting your activity. Ask what activities are safe for you.  Do not have sex until your health care provider says that this is safe.  If needed, make plans for someone to help with your regular activities.  General instructions    Take over-the-counter and prescription medicines only as told by your health care provider.  Do not take aspirin because it can cause bleeding.  Do not use tampons or douche.  Keep all follow-up visits. This is important.  Contact a health care provider if:  You have vaginal bleeding during any part of your pregnancy.  You have cramps or labor pains.  You have a fever or chills.  Get help right away if:  You have severe cramps in your back or abdomen.  You pass large clots or a large amount of tissue from your vagina.  Your bleeding increases.  You feel light-headed or weak, or you faint.  You are leaking fluid or have a gush of fluid from your vagina.  Summary  A small amount of bleeding from the vagina is common during early pregnancy.  Be sure to tell your health care provider about any vaginal bleeding right away.  Try to understand when bleeding occurs. Does bleeding occur on its own, or does it occur after something is done, such as sex or pelvic exams?  Keep all follow-up visits. This is important.  This information is not intended to replace advice given to you by your health care provider. Make sure you discuss any questions you have with your health care provider.

## 2023-08-24 NOTE — ED PROVIDER NOTE - CLINICAL SUMMARY MEDICAL DECISION MAKING FREE TEXT BOX
38F with no pmh  currently approx 6 weeks pregnant by LMP (23) p/w vaginal spotting since this morning.  R/o miscarriage  vs threatened     Plan:  - labs  - TVUS  - ua urine cx

## 2023-08-24 NOTE — ED PROVIDER NOTE - PHYSICAL EXAMINATION
constitutional: well appearing no acute distress, sitting comfortably   HEENT: head- normocephalic, atraumatic   Cardiac: regular rate and rhythm, normal S1 S2 no murmurs rubs or gallops  Respiratory: able to speak in full sentences, no wheezing rales or rhonchi, lungs CTA b/l   Abd: soft non tender, non distended, no CVAT  : scant dark brown vaginal discharge, cervical os closed, no cmt or adnexal tenderness b/l  Neuro: A x O x 4, face symmetric

## 2023-08-24 NOTE — ED PROVIDER NOTE - OBJECTIVE STATEMENT
38F with no pmh  currently approx 6 weeks pregnant by LMP (23) p/w vaginal spotting since this morning. Pt has f/u with gyn for her first appointment in 1-2 weeks. Intermittent spotting without having to use mentrual pads. Denies any abd pain, n/v/d, urinary symptoms, flank pain.

## 2023-08-24 NOTE — ED ADULT NURSE NOTE - OBJECTIVE STATEMENT
received pt in wellness room 1, 38 yr/o female A+OX4, ambulatory at baseline. denies significant past medical history. pt presented to the ED stating she is 6 weeks pregnant (not confirmed with US) and has been experiencing vaginal spotting since this morning. states blood is minimal when wiping and pink. denies pelvic pain. abdomen is flat, soft, nontender; denies nausea, vomiting, diarrhea, and constipation. PERRLA and facial symmetry noted. pt denies numbness, tingling, and weakness in peripheral extremities. VSS, denies chest pain and SOB, RR even and unlabored. pt has active and passive ROM of all extremities, no obvious joint deformities noted. skin is clean dry and intact. left AC 20g placed, labs drawn and sent. pt is stable at this time. received pt in wellness room 1, 38 yr/o female A+OX4, ambulatory at baseline. denies significant past medical history. pt presented to the ED stating she is 6 weeks pregnant (not confirmed with US) and has been experiencing vaginal spotting since this morning. states blood is minimal when wiping and pink. denies pelvic pain. abdomen is flat, soft, nontender; denies nausea, vomiting, diarrhea, and constipation. PERRLA and facial symmetry noted. pt denies numbness, tingling, and weakness in peripheral extremities. VSS, denies chest pain and SOB, RR even and unlabored. pt has active and passive ROM of all extremities, no obvious joint deformities noted. skin is clean dry and intact. left hand 22g placed, labs drawn and sent. pt is stable at this time.

## 2023-08-24 NOTE — ED PROVIDER NOTE - PROGRESS NOTE DETAILS
MITCHELL Appiah: labs and us wnl. Results d/w pt. PT has appointment with gyn next week. Return precautions d/w pt including worsening vaginal bleeding, worsening abd pain. PT verbalizes understanding

## 2023-08-29 ENCOUNTER — RESULT REVIEW (OUTPATIENT)
Age: 38
End: 2023-08-29

## 2023-08-29 ENCOUNTER — OUTPATIENT (OUTPATIENT)
Dept: OUTPATIENT SERVICES | Facility: HOSPITAL | Age: 38
LOS: 1 days | End: 2023-08-29

## 2023-08-29 ENCOUNTER — APPOINTMENT (OUTPATIENT)
Dept: OBGYN | Facility: HOSPITAL | Age: 38
End: 2023-08-29

## 2023-08-29 VITALS
HEART RATE: 103 BPM | BODY MASS INDEX: 39.56 KG/M2 | WEIGHT: 215 LBS | SYSTOLIC BLOOD PRESSURE: 120 MMHG | DIASTOLIC BLOOD PRESSURE: 73 MMHG | HEIGHT: 62 IN | TEMPERATURE: 98.4 F

## 2023-08-29 DIAGNOSIS — Z82.5 FAMILY HISTORY OF ASTHMA AND OTHER CHRONIC LOWER RESPIRATORY DISEASES: ICD-10-CM

## 2023-08-29 DIAGNOSIS — Z30.011 ENCOUNTER FOR INITIAL PRESCRIPTION OF CONTRACEPTIVE PILLS: ICD-10-CM

## 2023-08-29 DIAGNOSIS — Z98.890 OTHER SPECIFIED POSTPROCEDURAL STATES: Chronic | ICD-10-CM

## 2023-08-29 LAB
-  AMIKACIN: SIGNIFICANT CHANGE UP
-  AMOXICILLIN/CLAVULANIC ACID: SIGNIFICANT CHANGE UP
-  AMPICILLIN/SULBACTAM: SIGNIFICANT CHANGE UP
-  AMPICILLIN: SIGNIFICANT CHANGE UP
-  AZTREONAM: SIGNIFICANT CHANGE UP
-  CEFAZOLIN: SIGNIFICANT CHANGE UP
-  CEFEPIME: SIGNIFICANT CHANGE UP
-  CEFOXITIN: SIGNIFICANT CHANGE UP
-  CEFTRIAXONE: SIGNIFICANT CHANGE UP
-  CEFUROXIME: SIGNIFICANT CHANGE UP
-  CIPROFLOXACIN: SIGNIFICANT CHANGE UP
-  ERTAPENEM: SIGNIFICANT CHANGE UP
-  GENTAMICIN: SIGNIFICANT CHANGE UP
-  IMIPENEM: SIGNIFICANT CHANGE UP
-  LEVOFLOXACIN: SIGNIFICANT CHANGE UP
-  MEROPENEM: SIGNIFICANT CHANGE UP
-  NITROFURANTOIN: SIGNIFICANT CHANGE UP
-  PIPERACILLIN/TAZOBACTAM: SIGNIFICANT CHANGE UP
-  TOBRAMYCIN: SIGNIFICANT CHANGE UP
-  TRIMETHOPRIM/SULFAMETHOXAZOLE: SIGNIFICANT CHANGE UP
24R-OH-CALCIDIOL SERPL-MCNC: 16.4 NG/ML — LOW (ref 30–80)
A1C WITH ESTIMATED AVERAGE GLUCOSE RESULT: 5.3 % — SIGNIFICANT CHANGE UP (ref 4–5.6)
APPEARANCE UR: ABNORMAL
APTT BLD: 29.5 SEC — SIGNIFICANT CHANGE UP (ref 24.5–35.6)
BACTERIA # UR AUTO: ABNORMAL /HPF
BILIRUB UR-MCNC: NEGATIVE — SIGNIFICANT CHANGE UP
CAST: 0 /LPF — SIGNIFICANT CHANGE UP (ref 0–4)
COLOR SPEC: YELLOW — SIGNIFICANT CHANGE UP
CULTURE RESULTS: SIGNIFICANT CHANGE UP
DIFF PNL FLD: NEGATIVE — SIGNIFICANT CHANGE UP
DRVVT RATIO: 0.97 RATIO — SIGNIFICANT CHANGE UP (ref 0–1.21)
DRVVT SCREEN TO CONFIRM RATIO: NEGATIVE — SIGNIFICANT CHANGE UP
ESTIMATED AVERAGE GLUCOSE: 105 — SIGNIFICANT CHANGE UP
GLUCOSE UR QL: NEGATIVE MG/DL — SIGNIFICANT CHANGE UP
HIV 1+2 AB+HIV1 P24 AG SERPL QL IA: SIGNIFICANT CHANGE UP
KETONES UR-MCNC: NEGATIVE MG/DL — SIGNIFICANT CHANGE UP
LEUKOCYTE ESTERASE UR-ACNC: ABNORMAL
METHOD TYPE: SIGNIFICANT CHANGE UP
NITRITE UR-MCNC: NEGATIVE — SIGNIFICANT CHANGE UP
NORMALIZED SCT PPP-RTO: 1.05 RATIO — SIGNIFICANT CHANGE UP
NORMALIZED SCT PPP-RTO: NEGATIVE — SIGNIFICANT CHANGE UP
ORGANISM # SPEC MICROSCOPIC CNT: SIGNIFICANT CHANGE UP
ORGANISM # SPEC MICROSCOPIC CNT: SIGNIFICANT CHANGE UP
PH UR: 6.5 — SIGNIFICANT CHANGE UP (ref 5–8)
PROT UR-MCNC: SIGNIFICANT CHANGE UP MG/DL
RBC CASTS # UR COMP ASSIST: 8 /HPF — HIGH (ref 0–4)
REVIEW: SIGNIFICANT CHANGE UP
SP GR SPEC: 1.02 — SIGNIFICANT CHANGE UP (ref 1–1.03)
SPECIMEN SOURCE: SIGNIFICANT CHANGE UP
SQUAMOUS # UR AUTO: 4 /HPF — SIGNIFICANT CHANGE UP (ref 0–5)
URATE SERPL-MCNC: 3.5 MG/DL — SIGNIFICANT CHANGE UP (ref 2.5–7)
UROBILINOGEN FLD QL: 1 MG/DL — SIGNIFICANT CHANGE UP (ref 0.2–1)
WBC UR QL: 3 /HPF — SIGNIFICANT CHANGE UP (ref 0–5)

## 2023-08-30 DIAGNOSIS — Z34.91 ENCOUNTER FOR SUPERVISION OF NORMAL PREGNANCY, UNSPECIFIED, FIRST TRIMESTER: ICD-10-CM

## 2023-08-30 DIAGNOSIS — Z87.59 PERSONAL HISTORY OF OTHER COMPLICATIONS OF PREGNANCY, CHILDBIRTH AND THE PUERPERIUM: ICD-10-CM

## 2023-08-30 DIAGNOSIS — O99.210 OBESITY COMPLICATING PREGNANCY, UNSPECIFIED TRIMESTER: ICD-10-CM

## 2023-08-30 LAB
C TRACH RRNA SPEC QL NAA+PROBE: SIGNIFICANT CHANGE UP
HBV SURFACE AG SER-ACNC: SIGNIFICANT CHANGE UP
HCV AB S/CO SERPL IA: 0.12 S/CO — SIGNIFICANT CHANGE UP (ref 0–0.99)
HCV AB SERPL-IMP: SIGNIFICANT CHANGE UP
HPV HIGH+LOW RISK DNA PNL CVX: SIGNIFICANT CHANGE UP
MEV IGG SER-ACNC: 13.9 AU/ML — SIGNIFICANT CHANGE UP
MEV IGG+IGM SER-IMP: SIGNIFICANT CHANGE UP
N GONORRHOEA RRNA SPEC QL NAA+PROBE: SIGNIFICANT CHANGE UP
RUBV IGG SER-ACNC: 18.3 INDEX — SIGNIFICANT CHANGE UP
RUBV IGG SER-IMP: POSITIVE — SIGNIFICANT CHANGE UP
SPECIMEN SOURCE: SIGNIFICANT CHANGE UP
T PALLIDUM AB TITR SER: NEGATIVE — SIGNIFICANT CHANGE UP
VZV IGG FLD QL IA: 571.8 INDEX — SIGNIFICANT CHANGE UP
VZV IGG FLD QL IA: POSITIVE — SIGNIFICANT CHANGE UP

## 2023-08-30 RX ORDER — PYRIDOXINE HCL (VITAMIN B6) 25 MG
25 TABLET ORAL
Qty: 30 | Refills: 0 | Status: ACTIVE | COMMUNITY
Start: 2023-08-30 | End: 1900-01-01

## 2023-08-30 RX ORDER — AMOXICILLIN AND CLAVULANATE POTASSIUM 500; 125 MG/1; MG/1
500-125 TABLET, FILM COATED ORAL
Qty: 14 | Refills: 0 | Status: ACTIVE | COMMUNITY
Start: 2023-08-30 | End: 1900-01-01

## 2023-08-30 RX ORDER — DOXYLAMINE SUCCINATE 25 MG
25 TABLET ORAL
Qty: 30 | Refills: 0 | Status: ACTIVE | COMMUNITY
Start: 2023-08-30 | End: 1900-01-01

## 2023-08-30 NOTE — ED POST DISCHARGE NOTE - RESULT SUMMARY
UCX : E. Coli > 10,000-49,000CFU/ml . No antibiotic listed in ED provider note or prescription writer at time of discharge. Patient pregnant. Patient contacted denies any urinary symptoms and had follow up with OB yesterday Dr Moreno . Patient to call Dr Moreno to day and just have him review UCX results and decide if Abx required. According to patient when saw Dr Moreno yesterday he did a Urine. Discussed with patient need to return to ED if symptoms don't continue to improve or recur or develops any new or worsening symptoms that are of concern.

## 2023-08-31 LAB
B2 GLYCOPROT1 AB SER QL: NEGATIVE — SIGNIFICANT CHANGE UP
B2 GLYCOPROT1 IGA SER QL: <5 SAU — SIGNIFICANT CHANGE UP
CARDIOLIPIN AB SER-ACNC: NEGATIVE — SIGNIFICANT CHANGE UP
CYTOLOGY SPEC DOC CYTO: SIGNIFICANT CHANGE UP
GAMMA INTERFERON BACKGROUND BLD IA-ACNC: 0.01 IU/ML — SIGNIFICANT CHANGE UP
LEAD BLD-MCNC: <1 UG/DL — SIGNIFICANT CHANGE UP (ref 0–3.4)
M TB IFN-G BLD-IMP: NEGATIVE — SIGNIFICANT CHANGE UP
M TB IFN-G CD4+ BCKGRND COR BLD-ACNC: 0 IU/ML — SIGNIFICANT CHANGE UP
M TB IFN-G CD4+CD8+ BCKGRND COR BLD-ACNC: 0 IU/ML — SIGNIFICANT CHANGE UP
QUANT TB PLUS MITOGEN MINUS NIL: 3.81 IU/ML — SIGNIFICANT CHANGE UP

## 2023-09-01 ENCOUNTER — NON-APPOINTMENT (OUTPATIENT)
Age: 38
End: 2023-09-01

## 2023-09-25 ENCOUNTER — NON-APPOINTMENT (OUTPATIENT)
Age: 38
End: 2023-09-25

## 2023-09-26 ENCOUNTER — RESULT REVIEW (OUTPATIENT)
Age: 38
End: 2023-09-26

## 2023-09-26 ENCOUNTER — NON-APPOINTMENT (OUTPATIENT)
Age: 38
End: 2023-09-26

## 2023-09-26 ENCOUNTER — OUTPATIENT (OUTPATIENT)
Dept: OUTPATIENT SERVICES | Facility: HOSPITAL | Age: 38
LOS: 1 days | End: 2023-09-26

## 2023-09-26 ENCOUNTER — APPOINTMENT (OUTPATIENT)
Dept: OBGYN | Facility: HOSPITAL | Age: 38
End: 2023-09-26

## 2023-09-26 ENCOUNTER — MED ADMIN CHARGE (OUTPATIENT)
Age: 38
End: 2023-09-26

## 2023-09-26 VITALS
BODY MASS INDEX: 40.12 KG/M2 | DIASTOLIC BLOOD PRESSURE: 69 MMHG | TEMPERATURE: 98 F | HEIGHT: 62 IN | HEART RATE: 95 BPM | SYSTOLIC BLOOD PRESSURE: 113 MMHG | WEIGHT: 218 LBS | RESPIRATION RATE: 20 BRPM

## 2023-09-26 DIAGNOSIS — Z23 ENCOUNTER FOR IMMUNIZATION: ICD-10-CM

## 2023-09-26 DIAGNOSIS — Z32.00 ENCOUNTER FOR PREGNANCY TEST, RESULT UNKNOWN: ICD-10-CM

## 2023-09-26 DIAGNOSIS — O36.80X0 PREGNANCY WITH INCONCLUSIVE FETAL VIABILITY, NOT APPLICABLE OR UNSPECIFIED: ICD-10-CM

## 2023-09-26 DIAGNOSIS — O09.529 SUPERVISION OF ELDERLY MULTIGRAVIDA, UNSPECIFIED TRIMESTER: ICD-10-CM

## 2023-09-26 DIAGNOSIS — Z87.59 PERSONAL HISTORY OF OTHER COMPLICATIONS OF PREGNANCY, CHILDBIRTH AND THE PUERPERIUM: ICD-10-CM

## 2023-09-26 DIAGNOSIS — Z34.91 ENCOUNTER FOR SUPERVISION OF NORMAL PREGNANCY, UNSPECIFIED, FIRST TRIMESTER: ICD-10-CM

## 2023-09-26 DIAGNOSIS — Z32.01 ENCOUNTER FOR PREGNANCY TEST, RESULT POSITIVE: ICD-10-CM

## 2023-09-26 DIAGNOSIS — E55.9 VITAMIN D DEFICIENCY, UNSPECIFIED: ICD-10-CM

## 2023-09-26 LAB
APPEARANCE UR: CLEAR — SIGNIFICANT CHANGE UP
BILIRUB UR-MCNC: NEGATIVE — SIGNIFICANT CHANGE UP
COLOR SPEC: YELLOW — SIGNIFICANT CHANGE UP
DIFF PNL FLD: NEGATIVE — SIGNIFICANT CHANGE UP
GLUCOSE 1H P MEAL SERPL-MCNC: 111 MG/DL — SIGNIFICANT CHANGE UP (ref 70–134)
GLUCOSE UR QL: NEGATIVE MG/DL — SIGNIFICANT CHANGE UP
KETONES UR-MCNC: NEGATIVE MG/DL — SIGNIFICANT CHANGE UP
LEUKOCYTE ESTERASE UR-ACNC: NEGATIVE — SIGNIFICANT CHANGE UP
MEV IGG SER-ACNC: 11.7 AU/ML — SIGNIFICANT CHANGE UP
MEV IGG+IGM SER-IMP: NEGATIVE — SIGNIFICANT CHANGE UP
NITRITE UR-MCNC: NEGATIVE — SIGNIFICANT CHANGE UP
PH UR: 7 — SIGNIFICANT CHANGE UP (ref 5–8)
PROT UR-MCNC: NEGATIVE MG/DL — SIGNIFICANT CHANGE UP
SP GR SPEC: 1.02 — SIGNIFICANT CHANGE UP (ref 1–1.03)
UROBILINOGEN FLD QL: 0.2 MG/DL — SIGNIFICANT CHANGE UP (ref 0.2–1)

## 2023-09-26 RX ORDER — ASPIRIN 81 MG/1
81 TABLET, COATED ORAL DAILY
Qty: 60 | Refills: 3 | Status: ACTIVE | COMMUNITY
Start: 2023-09-26 | End: 1900-01-01

## 2023-09-26 RX ORDER — UBIDECARENONE/VIT E ACET 100MG-5
50 MCG CAPSULE ORAL
Qty: 60 | Refills: 1 | Status: ACTIVE | COMMUNITY
Start: 2023-09-26 | End: 1900-01-01

## 2023-10-02 ENCOUNTER — NON-APPOINTMENT (OUTPATIENT)
Age: 38
End: 2023-10-02

## 2023-10-02 ENCOUNTER — EMERGENCY (EMERGENCY)
Facility: HOSPITAL | Age: 38
LOS: 1 days | Discharge: HOME | End: 2023-10-02
Admitting: STUDENT IN AN ORGANIZED HEALTH CARE EDUCATION/TRAINING PROGRAM
Payer: MEDICAID

## 2023-10-02 VITALS
TEMPERATURE: 98 F | HEART RATE: 93 BPM | SYSTOLIC BLOOD PRESSURE: 121 MMHG | OXYGEN SATURATION: 100 % | RESPIRATION RATE: 17 BRPM | DIASTOLIC BLOOD PRESSURE: 61 MMHG

## 2023-10-02 DIAGNOSIS — Z98.890 OTHER SPECIFIED POSTPROCEDURAL STATES: Chronic | ICD-10-CM

## 2023-10-02 LAB
ALBUMIN SERPL ELPH-MCNC: 3.8 G/DL — SIGNIFICANT CHANGE UP (ref 3.3–5)
ALP SERPL-CCNC: 66 U/L — SIGNIFICANT CHANGE UP (ref 40–120)
ALT FLD-CCNC: 10 U/L — SIGNIFICANT CHANGE UP (ref 4–33)
ANION GAP SERPL CALC-SCNC: 10 MMOL/L — SIGNIFICANT CHANGE UP (ref 7–14)
APPEARANCE UR: CLEAR — SIGNIFICANT CHANGE UP
AST SERPL-CCNC: 15 U/L — SIGNIFICANT CHANGE UP (ref 4–32)
BACTERIA # UR AUTO: NEGATIVE /HPF — SIGNIFICANT CHANGE UP
BASE EXCESS BLDV CALC-SCNC: -3.9 MMOL/L — LOW (ref -2–3)
BASOPHILS # BLD AUTO: 0.04 K/UL — SIGNIFICANT CHANGE UP (ref 0–0.2)
BASOPHILS NFR BLD AUTO: 0.4 % — SIGNIFICANT CHANGE UP (ref 0–2)
BILIRUB SERPL-MCNC: <0.2 MG/DL — SIGNIFICANT CHANGE UP (ref 0.2–1.2)
BILIRUB UR-MCNC: NEGATIVE — SIGNIFICANT CHANGE UP
BLOOD GAS VENOUS COMPREHENSIVE RESULT: SIGNIFICANT CHANGE UP
BUN SERPL-MCNC: 7 MG/DL — SIGNIFICANT CHANGE UP (ref 7–23)
CALCIUM SERPL-MCNC: 9.3 MG/DL — SIGNIFICANT CHANGE UP (ref 8.4–10.5)
CAST: 0 /LPF — SIGNIFICANT CHANGE UP (ref 0–4)
CHLORIDE BLDV-SCNC: 103 MMOL/L — SIGNIFICANT CHANGE UP (ref 96–108)
CHLORIDE SERPL-SCNC: 102 MMOL/L — SIGNIFICANT CHANGE UP (ref 98–107)
CO2 BLDV-SCNC: 22.8 MMOL/L — SIGNIFICANT CHANGE UP (ref 22–26)
CO2 SERPL-SCNC: 20 MMOL/L — LOW (ref 22–31)
COLOR SPEC: YELLOW — SIGNIFICANT CHANGE UP
CREAT SERPL-MCNC: 0.36 MG/DL — LOW (ref 0.5–1.3)
DIFF PNL FLD: ABNORMAL
EGFR: 133 ML/MIN/1.73M2 — SIGNIFICANT CHANGE UP
EOSINOPHIL # BLD AUTO: 0.19 K/UL — SIGNIFICANT CHANGE UP (ref 0–0.5)
EOSINOPHIL NFR BLD AUTO: 1.7 % — SIGNIFICANT CHANGE UP (ref 0–6)
GAS PNL BLDV: 132 MMOL/L — LOW (ref 136–145)
GAS PNL BLDV: SIGNIFICANT CHANGE UP
GLUCOSE BLDV-MCNC: 88 MG/DL — SIGNIFICANT CHANGE UP (ref 70–99)
GLUCOSE SERPL-MCNC: 82 MG/DL — SIGNIFICANT CHANGE UP (ref 70–99)
GLUCOSE UR QL: NEGATIVE MG/DL — SIGNIFICANT CHANGE UP
HCG SERPL-ACNC: SIGNIFICANT CHANGE UP MIU/ML
HCO3 BLDV-SCNC: 22 MMOL/L — SIGNIFICANT CHANGE UP (ref 22–29)
HCT VFR BLD CALC: 36.3 % — SIGNIFICANT CHANGE UP (ref 34.5–45)
HCT VFR BLDA CALC: 37 % — SIGNIFICANT CHANGE UP (ref 34.5–46.5)
HGB BLD CALC-MCNC: 12.2 G/DL — SIGNIFICANT CHANGE UP (ref 11.7–16.1)
HGB BLD-MCNC: 11.9 G/DL — SIGNIFICANT CHANGE UP (ref 11.5–15.5)
IANC: 8.05 K/UL — HIGH (ref 1.8–7.4)
IMM GRANULOCYTES NFR BLD AUTO: 1 % — HIGH (ref 0–0.9)
KETONES UR-MCNC: NEGATIVE MG/DL — SIGNIFICANT CHANGE UP
LACTATE BLDV-MCNC: 1.2 MMOL/L — SIGNIFICANT CHANGE UP (ref 0.5–2)
LEUKOCYTE ESTERASE UR-ACNC: ABNORMAL
LIDOCAIN IGE QN: 28 U/L — SIGNIFICANT CHANGE UP (ref 7–60)
LYMPHOCYTES # BLD AUTO: 19.1 % — SIGNIFICANT CHANGE UP (ref 13–44)
LYMPHOCYTES # BLD AUTO: 2.14 K/UL — SIGNIFICANT CHANGE UP (ref 1–3.3)
MCHC RBC-ENTMCNC: 27 PG — SIGNIFICANT CHANGE UP (ref 27–34)
MCHC RBC-ENTMCNC: 32.8 GM/DL — SIGNIFICANT CHANGE UP (ref 32–36)
MCV RBC AUTO: 82.5 FL — SIGNIFICANT CHANGE UP (ref 80–100)
MONOCYTES # BLD AUTO: 0.66 K/UL — SIGNIFICANT CHANGE UP (ref 0–0.9)
MONOCYTES NFR BLD AUTO: 5.9 % — SIGNIFICANT CHANGE UP (ref 2–14)
NEUTROPHILS # BLD AUTO: 8.05 K/UL — HIGH (ref 1.8–7.4)
NEUTROPHILS NFR BLD AUTO: 71.9 % — SIGNIFICANT CHANGE UP (ref 43–77)
NITRITE UR-MCNC: NEGATIVE — SIGNIFICANT CHANGE UP
NRBC # BLD: 0 /100 WBCS — SIGNIFICANT CHANGE UP (ref 0–0)
NRBC # FLD: 0 K/UL — SIGNIFICANT CHANGE UP (ref 0–0)
PCO2 BLDV: 40 MMHG — SIGNIFICANT CHANGE UP (ref 39–52)
PH BLDV: 7.34 — SIGNIFICANT CHANGE UP (ref 7.32–7.43)
PH UR: 6 — SIGNIFICANT CHANGE UP (ref 5–8)
PLATELET # BLD AUTO: 384 K/UL — SIGNIFICANT CHANGE UP (ref 150–400)
PO2 BLDV: 38 MMHG — SIGNIFICANT CHANGE UP (ref 25–45)
POTASSIUM BLDV-SCNC: 4.6 MMOL/L — SIGNIFICANT CHANGE UP (ref 3.5–5.1)
POTASSIUM SERPL-MCNC: 4.2 MMOL/L — SIGNIFICANT CHANGE UP (ref 3.5–5.3)
POTASSIUM SERPL-SCNC: 4.2 MMOL/L — SIGNIFICANT CHANGE UP (ref 3.5–5.3)
PROT SERPL-MCNC: 6.9 G/DL — SIGNIFICANT CHANGE UP (ref 6–8.3)
PROT UR-MCNC: NEGATIVE MG/DL — SIGNIFICANT CHANGE UP
RBC # BLD: 4.4 M/UL — SIGNIFICANT CHANGE UP (ref 3.8–5.2)
RBC # FLD: 15.2 % — HIGH (ref 10.3–14.5)
RBC CASTS # UR COMP ASSIST: 1 /HPF — SIGNIFICANT CHANGE UP (ref 0–4)
SAO2 % BLDV: 66.2 % — LOW (ref 67–88)
SODIUM SERPL-SCNC: 132 MMOL/L — LOW (ref 135–145)
SP GR SPEC: 1.01 — SIGNIFICANT CHANGE UP (ref 1–1.03)
SQUAMOUS # UR AUTO: 2 /HPF — SIGNIFICANT CHANGE UP (ref 0–5)
UROBILINOGEN FLD QL: 0.2 MG/DL — SIGNIFICANT CHANGE UP (ref 0.2–1)
WBC # BLD: 11.19 K/UL — HIGH (ref 3.8–10.5)
WBC # FLD AUTO: 11.19 K/UL — HIGH (ref 3.8–10.5)
WBC UR QL: 5 /HPF — SIGNIFICANT CHANGE UP (ref 0–5)

## 2023-10-02 PROCEDURE — 99284 EMERGENCY DEPT VISIT MOD MDM: CPT

## 2023-10-02 PROCEDURE — 76801 OB US < 14 WKS SINGLE FETUS: CPT | Mod: 26

## 2023-10-02 RX ORDER — CEFPODOXIME PROXETIL 100 MG
100 TABLET ORAL ONCE
Refills: 0 | Status: COMPLETED | OUTPATIENT
Start: 2023-10-02 | End: 2023-10-02

## 2023-10-02 RX ADMIN — Medication 100 MILLIGRAM(S): at 16:27

## 2023-10-02 NOTE — ED ADULT NURSE NOTE - OBJECTIVE STATEMENT
38 year old female, received to intake. Pt A&Ox4, ambulatory. Respirations equal and unlabored. Denies any past medical history. Pt approx 11 weeks pregnant, c/o RLQ pain. Pt told she has a UTI. Pt denies urinary burning, frequency, urgency, vaginal bleeding, foul smelling urine or discharge. Pt denies chest pain, SOB, palpitations, dizziness, blurry vision, headache, numbness, tingling, any other complaints. 22G IV placed to R hand. Labs drawn and sent. Urine collected. Pending US. Pt to results waiting after US. No acute distress noted. Safety maintained.

## 2023-10-02 NOTE — ED PROVIDER NOTE - GENITOURINARY UTERUS EXAM
This note was copied from the mother's chart. Pt. Stated pumping is going well. Pt. Stated she has no questions or concerns at this time. normal

## 2023-10-02 NOTE — ED PROVIDER NOTE - NSFOLLOWUPINSTRUCTIONS_ED_ALL_ED_FT
Urinary Tract Infection in Pregnancy    WHAT YOU NEED TO KNOW:    What is a urinary tract infection (UTI)? A UTI is caused by bacteria that get inside your urinary tract. The urinary tract includes your kidneys and bladder. UTIs are common during pregnancy. This is because of changes in your immune system, hormones, and uterus. As your uterus grows, your bladder may not completely empty. Bacteria can grow in the urine left in your bladder and cause a UTI. UTIs during pregnancy can increase your risk for a kidney infection and  labor.  Female Urinary System    What are the signs and symptoms of a UTI?    Urinating more often, leaking urine, or waking from sleep to urinate    Pain or burning when you urinate    Pain or pressure in your lower abdomen    Urine that smells bad    Blood in your urine  How is a UTI diagnosed? Your healthcare provider will ask about your signs and symptoms. Your provider may press on your stomach, sides, and back to check if you feel pain. You may also need any of the following:    Urinalysis will show infection and your overall health.    Urine cultures may show which germ is causing your infection.  How is a UTI treated?    Antibiotics treat a bacterial infection.    Medicines may be given to decrease pain and burning when you urinate or to decrease the urge to urinate often.  What can I do to prevent a UTI?    Urinate when you feel the urge. Do not hold your urine. Urinate as soon as needed. Always urinate after you have sex. This helps flush out bacteria passed during sex.    Drink liquids as directed. Ask how much liquid to drink each day and which liquids are best for you. You may need to drink more fluids than usual to help flush bacteria out of your urinary tract. Do not drink caffeine or carbonated liquids. These drinks can irritate your bladder. Your healthcare provider may recommend cranberry juice to help prevent a UTI.    Wipe from front to back after you urinate or have a bowel movement. This will help prevent germs from getting into your urinary tract through your urethra.    Do pelvic muscle exercises often. Pelvic muscle exercises may help you start and stop urinating. Strong pelvic muscles may help you empty your bladder easier. Squeeze these muscles tightly for 5 seconds like you are trying to hold back urine. Then relax for 5 seconds. Gradually work up to squeezing for 10 seconds. Do 3 sets of 15 repetitions a day, or as directed.  When should I seek immediate care?    You are urinating very little or not at all.    You have severe pain in your lower abdomen.    You have a fever and chills.  When should I call my doctor or obstetrician?    You have pain in the sides of your back.    You do not feel better after 2 days of treatment.    You are vomiting.    You have questions or concerns about your condition or care.  CARE AGREEMENT:    You have the right to help plan your care. Learn about your health condition and how it may be treated. Discuss treatment options with your healthcare providers to decide what care you want to receive. You always have the right to refuse treatment.

## 2023-10-02 NOTE — ED PROVIDER NOTE - PATIENT PORTAL LINK FT
You can access the FollowMyHealth Patient Portal offered by [facility] by registering at the following website: [email or web address]. By joining [employer]’s FollowMyHealth portal, you will also be able to view your health information using other applications (apps) compatible with our system.

## 2023-10-02 NOTE — ED PROVIDER NOTE - CLINICAL SUMMARY MEDICAL DECISION MAKING FREE TEXT BOX
37 y/o female 11 weeks  who is currently approximately 11 weeks pregnant presenting with atraumatic non- radiating episodic right pelvic pain x 3 hours. Pt also endorses suprapubic pain/pressure x 2-3 days and was dx with UTI today by OBGYN but given pain was advised to go to the ER.    This patient presents with symptoms consistent with acute uncomplicated cystitis. No systemic symptoms. Not septic. Well appearing. Low suspicion for acute pyelonephritis given lack of fever, CVAT, or systemic features. Low suspicion for kidney stone or infected stone. ICON negative; low concern for ectopic as pt has OB US done on Tuesday which confirmed IUP. Given peliv pain in pregnancy will obtain US, will also r/o torsion although low clinical suspicion     Plan: UA, UCx, labs, TVUS, Reassess

## 2023-10-02 NOTE — ED PROVIDER NOTE - PROGRESS NOTE DETAILS
MITCHELL Mullins: Patient endorses no longer in pain.  Discussed ultrasound findings with confirmed IUP and appropriate fetal heart rate.  Discussed labs within normal limits, however UA suggestive of a urinary tract infection.  Patient states she would follow-up with her OB/GYN Dr. Moreno on Thursday (3 days from today) and will  her antibiotic as was already sent by her OB/GYN.  Discussed strict return precautions and prompt follow-up.  Discussed if she starts developing unilateral pelvic pain there is concern for ovarian torsion to strictly return immediately if experiencing reoccuring or worsening, pain, nausea, vomiting, fever, chills, vaginal bleeding or any other concerning symptoms. Pt tolerated P trail of crackers. Pt verbalized an understanding and agrees with the plan. IV removed.

## 2023-10-02 NOTE — ED PROVIDER NOTE - OBJECTIVE STATEMENT
39 y/o female 11 weeks pregnant presenting with right pelvic pain x 1 day. Denies fever, dysuria, vaginal discharge, 37 y/o female 11 weeks  pregnant presenting with right pelvic pain x 1 day. Denies fever, n/v, dysuria, vaginal discharge. 39 y/o female 11 weeks  who is currently approximately 11 weeks pregnant presenting with atraumatic non- radiating episodic right pelvic pain x 3 hours. Pt also endorses suprapubic pain/pressure x 2-3 days and was dx with UTI today by OBGYN but given pain was advised to go to the ER.    Pt unsure what abx was sent. Has not taken anything for pain. No prior AP surgeries      Denies fevers, chills, dysuria, hematuria, vaginal bleeding, vaginal discharge, vaginal itching, change in urinary pattern, back pain, nausea, vomiting, diarrhea, rash, cough or congestion, chest pain, shortness of breath, palpitations, dizziness, lightheadedness,

## 2023-10-05 ENCOUNTER — APPOINTMENT (OUTPATIENT)
Dept: MATERNAL FETAL MEDICINE | Facility: HOSPITAL | Age: 38
End: 2023-10-05
Payer: MEDICAID

## 2023-10-05 ENCOUNTER — ASOB RESULT (OUTPATIENT)
Age: 38
End: 2023-10-05

## 2023-10-05 PROCEDURE — 76801 OB US < 14 WKS SINGLE FETUS: CPT | Mod: 26,59

## 2023-10-05 PROCEDURE — 76813 OB US NUCHAL MEAS 1 GEST: CPT | Mod: 26

## 2023-10-05 NOTE — ED POST DISCHARGE NOTE - RESULT SUMMARY
Urine Culture:  >100,000 E.coli. Pan-sensitive. Patient dced on Cefpodoxime. No further intervention needed

## 2023-10-23 ENCOUNTER — NON-APPOINTMENT (OUTPATIENT)
Age: 38
End: 2023-10-23

## 2023-10-24 ENCOUNTER — APPOINTMENT (OUTPATIENT)
Dept: OBGYN | Facility: HOSPITAL | Age: 38
End: 2023-10-24

## 2023-10-24 ENCOUNTER — OUTPATIENT (OUTPATIENT)
Dept: OUTPATIENT SERVICES | Facility: HOSPITAL | Age: 38
LOS: 1 days | End: 2023-10-24

## 2023-10-24 ENCOUNTER — RESULT REVIEW (OUTPATIENT)
Age: 38
End: 2023-10-24

## 2023-10-24 VITALS
SYSTOLIC BLOOD PRESSURE: 119 MMHG | WEIGHT: 224 LBS | HEART RATE: 98 BPM | TEMPERATURE: 98 F | HEIGHT: 62 IN | DIASTOLIC BLOOD PRESSURE: 56 MMHG | BODY MASS INDEX: 41.22 KG/M2

## 2023-10-24 DIAGNOSIS — Z98.890 OTHER SPECIFIED POSTPROCEDURAL STATES: Chronic | ICD-10-CM

## 2023-10-24 LAB
APPEARANCE UR: CLEAR — SIGNIFICANT CHANGE UP
APPEARANCE UR: CLEAR — SIGNIFICANT CHANGE UP
BACTERIA # UR AUTO: NEGATIVE /HPF — SIGNIFICANT CHANGE UP
BACTERIA # UR AUTO: NEGATIVE /HPF — SIGNIFICANT CHANGE UP
BILIRUB UR-MCNC: NEGATIVE — SIGNIFICANT CHANGE UP
BILIRUB UR-MCNC: NEGATIVE — SIGNIFICANT CHANGE UP
CAST: 1 /LPF — SIGNIFICANT CHANGE UP (ref 0–4)
CAST: 1 /LPF — SIGNIFICANT CHANGE UP (ref 0–4)
COLOR SPEC: YELLOW — SIGNIFICANT CHANGE UP
COLOR SPEC: YELLOW — SIGNIFICANT CHANGE UP
DIFF PNL FLD: NEGATIVE — SIGNIFICANT CHANGE UP
DIFF PNL FLD: NEGATIVE — SIGNIFICANT CHANGE UP
GLUCOSE UR QL: NEGATIVE MG/DL — SIGNIFICANT CHANGE UP
GLUCOSE UR QL: NEGATIVE MG/DL — SIGNIFICANT CHANGE UP
KETONES UR-MCNC: NEGATIVE MG/DL — SIGNIFICANT CHANGE UP
KETONES UR-MCNC: NEGATIVE MG/DL — SIGNIFICANT CHANGE UP
LEUKOCYTE ESTERASE UR-ACNC: ABNORMAL
LEUKOCYTE ESTERASE UR-ACNC: ABNORMAL
NITRITE UR-MCNC: NEGATIVE — SIGNIFICANT CHANGE UP
NITRITE UR-MCNC: NEGATIVE — SIGNIFICANT CHANGE UP
PH UR: 7.5 — SIGNIFICANT CHANGE UP (ref 5–8)
PH UR: 7.5 — SIGNIFICANT CHANGE UP (ref 5–8)
PROT UR-MCNC: NEGATIVE MG/DL — SIGNIFICANT CHANGE UP
PROT UR-MCNC: NEGATIVE MG/DL — SIGNIFICANT CHANGE UP
RBC CASTS # UR COMP ASSIST: 1 /HPF — SIGNIFICANT CHANGE UP (ref 0–4)
RBC CASTS # UR COMP ASSIST: 1 /HPF — SIGNIFICANT CHANGE UP (ref 0–4)
SP GR SPEC: 1.01 — SIGNIFICANT CHANGE UP (ref 1–1.03)
SP GR SPEC: 1.01 — SIGNIFICANT CHANGE UP (ref 1–1.03)
SQUAMOUS # UR AUTO: 7 /HPF — HIGH (ref 0–5)
SQUAMOUS # UR AUTO: 7 /HPF — HIGH (ref 0–5)
UROBILINOGEN FLD QL: 0.2 MG/DL — SIGNIFICANT CHANGE UP (ref 0.2–1)
UROBILINOGEN FLD QL: 0.2 MG/DL — SIGNIFICANT CHANGE UP (ref 0.2–1)
WBC UR QL: 3 /HPF — SIGNIFICANT CHANGE UP (ref 0–5)
WBC UR QL: 3 /HPF — SIGNIFICANT CHANGE UP (ref 0–5)

## 2023-10-24 RX ORDER — NORGESTIMATE AND ETHINYL ESTRADIOL 0.25-0.035
0.25-35 KIT ORAL DAILY
Qty: 1 | Refills: 12 | Status: DISCONTINUED | COMMUNITY
Start: 2023-01-04 | End: 2023-10-24

## 2023-10-25 DIAGNOSIS — O09.292 SUPERVISION OF PREGNANCY WITH OTHER POOR REPRODUCTIVE OR OBSTETRIC HISTORY, SECOND TRIMESTER: ICD-10-CM

## 2023-10-25 DIAGNOSIS — Z34.83 ENCOUNTER FOR SUPERVISION OF OTHER NORMAL PREGNANCY, THIRD TRIMESTER: ICD-10-CM

## 2023-10-25 DIAGNOSIS — E55.9 VITAMIN D DEFICIENCY, UNSPECIFIED: ICD-10-CM

## 2023-10-25 DIAGNOSIS — O23.40 UNSPECIFIED INFECTION OF URINARY TRACT IN PREGNANCY, UNSPECIFIED TRIMESTER: ICD-10-CM

## 2023-10-25 DIAGNOSIS — Z23 ENCOUNTER FOR IMMUNIZATION: ICD-10-CM

## 2023-10-25 DIAGNOSIS — O99.013 ANEMIA COMPLICATING PREGNANCY, THIRD TRIMESTER: ICD-10-CM

## 2023-10-25 DIAGNOSIS — O26.899 OTHER SPECIFIED PREGNANCY RELATED CONDITIONS, UNSPECIFIED TRIMESTER: ICD-10-CM

## 2023-10-27 LAB
-  AMIKACIN: SIGNIFICANT CHANGE UP
-  AMIKACIN: SIGNIFICANT CHANGE UP
-  AMOXICILLIN/CLAVULANIC ACID: SIGNIFICANT CHANGE UP
-  AMOXICILLIN/CLAVULANIC ACID: SIGNIFICANT CHANGE UP
-  AMPICILLIN/SULBACTAM: SIGNIFICANT CHANGE UP
-  AMPICILLIN/SULBACTAM: SIGNIFICANT CHANGE UP
-  AMPICILLIN: SIGNIFICANT CHANGE UP
-  AMPICILLIN: SIGNIFICANT CHANGE UP
-  AZTREONAM: SIGNIFICANT CHANGE UP
-  AZTREONAM: SIGNIFICANT CHANGE UP
-  CEFAZOLIN: SIGNIFICANT CHANGE UP
-  CEFAZOLIN: SIGNIFICANT CHANGE UP
-  CEFEPIME: SIGNIFICANT CHANGE UP
-  CEFEPIME: SIGNIFICANT CHANGE UP
-  CEFOXITIN: SIGNIFICANT CHANGE UP
-  CEFOXITIN: SIGNIFICANT CHANGE UP
-  CEFTRIAXONE: SIGNIFICANT CHANGE UP
-  CEFTRIAXONE: SIGNIFICANT CHANGE UP
-  CEFUROXIME: SIGNIFICANT CHANGE UP
-  CEFUROXIME: SIGNIFICANT CHANGE UP
-  CIPROFLOXACIN: SIGNIFICANT CHANGE UP
-  CIPROFLOXACIN: SIGNIFICANT CHANGE UP
-  ERTAPENEM: SIGNIFICANT CHANGE UP
-  ERTAPENEM: SIGNIFICANT CHANGE UP
-  GENTAMICIN: SIGNIFICANT CHANGE UP
-  GENTAMICIN: SIGNIFICANT CHANGE UP
-  IMIPENEM: SIGNIFICANT CHANGE UP
-  IMIPENEM: SIGNIFICANT CHANGE UP
-  LEVOFLOXACIN: SIGNIFICANT CHANGE UP
-  LEVOFLOXACIN: SIGNIFICANT CHANGE UP
-  MEROPENEM: SIGNIFICANT CHANGE UP
-  MEROPENEM: SIGNIFICANT CHANGE UP
-  NITROFURANTOIN: SIGNIFICANT CHANGE UP
-  NITROFURANTOIN: SIGNIFICANT CHANGE UP
-  PIPERACILLIN/TAZOBACTAM: SIGNIFICANT CHANGE UP
-  PIPERACILLIN/TAZOBACTAM: SIGNIFICANT CHANGE UP
-  TOBRAMYCIN: SIGNIFICANT CHANGE UP
-  TOBRAMYCIN: SIGNIFICANT CHANGE UP
-  TRIMETHOPRIM/SULFAMETHOXAZOLE: SIGNIFICANT CHANGE UP
-  TRIMETHOPRIM/SULFAMETHOXAZOLE: SIGNIFICANT CHANGE UP
CULTURE RESULTS: ABNORMAL
CULTURE RESULTS: ABNORMAL
METHOD TYPE: SIGNIFICANT CHANGE UP
METHOD TYPE: SIGNIFICANT CHANGE UP
ORGANISM # SPEC MICROSCOPIC CNT: ABNORMAL
SPECIMEN SOURCE: SIGNIFICANT CHANGE UP
SPECIMEN SOURCE: SIGNIFICANT CHANGE UP

## 2023-10-31 ENCOUNTER — NON-APPOINTMENT (OUTPATIENT)
Age: 38
End: 2023-10-31

## 2023-10-31 RX ORDER — NITROFURANTOIN (MONOHYDRATE/MACROCRYSTALS) 25; 75 MG/1; MG/1
100 CAPSULE ORAL
Qty: 14 | Refills: 1 | Status: ACTIVE | COMMUNITY
Start: 2023-10-31 | End: 1900-01-01

## 2023-11-06 ENCOUNTER — NON-APPOINTMENT (OUTPATIENT)
Age: 38
End: 2023-11-06

## 2023-11-07 ENCOUNTER — OUTPATIENT (OUTPATIENT)
Dept: OUTPATIENT SERVICES | Facility: HOSPITAL | Age: 38
LOS: 1 days | End: 2023-11-07

## 2023-11-07 ENCOUNTER — RESULT REVIEW (OUTPATIENT)
Age: 38
End: 2023-11-07

## 2023-11-07 ENCOUNTER — APPOINTMENT (OUTPATIENT)
Dept: OBGYN | Facility: HOSPITAL | Age: 38
End: 2023-11-07

## 2023-11-07 VITALS
WEIGHT: 226 LBS | HEART RATE: 115 BPM | BODY MASS INDEX: 41.59 KG/M2 | SYSTOLIC BLOOD PRESSURE: 112 MMHG | HEIGHT: 62 IN | DIASTOLIC BLOOD PRESSURE: 68 MMHG | TEMPERATURE: 98.1 F

## 2023-11-07 DIAGNOSIS — Z92.29 PERSONAL HISTORY OF OTHER DRUG THERAPY: ICD-10-CM

## 2023-11-07 DIAGNOSIS — O21.9 VOMITING OF PREGNANCY, UNSPECIFIED: ICD-10-CM

## 2023-11-07 DIAGNOSIS — Z98.890 OTHER SPECIFIED POSTPROCEDURAL STATES: Chronic | ICD-10-CM

## 2023-11-07 DIAGNOSIS — Z34.91 ENCOUNTER FOR SUPERVISION OF NORMAL PREGNANCY, UNSPECIFIED, FIRST TRIMESTER: ICD-10-CM

## 2023-11-10 LAB
AFP ADJ MOM SERPL: 0.92 — SIGNIFICANT CHANGE UP
AFP ADJ MOM SERPL: 0.92 — SIGNIFICANT CHANGE UP
AFP INTERP SERPL-IMP: SIGNIFICANT CHANGE UP
AFP SERPL-MCNC: 26.2 NG/ML — SIGNIFICANT CHANGE UP
AFP SERPL-MCNC: 26.2 NG/ML — SIGNIFICANT CHANGE UP
AGE AT DELIVERY: 38.9 YR — SIGNIFICANT CHANGE UP
AGE AT DELIVERY: 38.9 YR — SIGNIFICANT CHANGE UP
ALPHA FETOPROTEIN SERUM COMMENT: SIGNIFICANT CHANGE UP
ALPHA FETOPROTEIN SERUM COMMENT: SIGNIFICANT CHANGE UP
ALPHA FETOPROTEIN SERUM RESULTS: SIGNIFICANT CHANGE UP
ALPHA FETOPROTEIN SERUM RESULTS: SIGNIFICANT CHANGE UP
GA METHOD: SIGNIFICANT CHANGE UP
GA METHOD: SIGNIFICANT CHANGE UP
GA: 16.6 WEEKS — SIGNIFICANT CHANGE UP
GA: 16.6 WEEKS — SIGNIFICANT CHANGE UP
IDDM PATIENT QL: SIGNIFICANT CHANGE UP
IDDM PATIENT QL: SIGNIFICANT CHANGE UP
MULTIPLE PREGNANCY: SIGNIFICANT CHANGE UP
MULTIPLE PREGNANCY: SIGNIFICANT CHANGE UP
NEURAL TUBE DEFECT RISK FETUS: SIGNIFICANT CHANGE UP
NEURAL TUBE DEFECT RISK FETUS: SIGNIFICANT CHANGE UP
RACE AFP: SIGNIFICANT CHANGE UP
RACE AFP: SIGNIFICANT CHANGE UP

## 2023-11-13 DIAGNOSIS — O09.292 SUPERVISION OF PREGNANCY WITH OTHER POOR REPRODUCTIVE OR OBSTETRIC HISTORY, SECOND TRIMESTER: ICD-10-CM

## 2023-11-13 DIAGNOSIS — N39.0 URINARY TRACT INFECTION, SITE NOT SPECIFIED: ICD-10-CM

## 2023-12-03 ENCOUNTER — EMERGENCY (EMERGENCY)
Facility: HOSPITAL | Age: 38
LOS: 1 days | Discharge: HOME | End: 2023-12-03
Attending: EMERGENCY MEDICINE | Admitting: EMERGENCY MEDICINE
Payer: MEDICAID

## 2023-12-03 VITALS
RESPIRATION RATE: 20 BRPM | SYSTOLIC BLOOD PRESSURE: 124 MMHG | DIASTOLIC BLOOD PRESSURE: 76 MMHG | OXYGEN SATURATION: 99 % | TEMPERATURE: 98 F | HEART RATE: 105 BPM

## 2023-12-03 DIAGNOSIS — Z98.890 OTHER SPECIFIED POSTPROCEDURAL STATES: Chronic | ICD-10-CM

## 2023-12-03 LAB
FLUAV AG NPH QL: SIGNIFICANT CHANGE UP
FLUAV AG NPH QL: SIGNIFICANT CHANGE UP
FLUBV AG NPH QL: SIGNIFICANT CHANGE UP
FLUBV AG NPH QL: SIGNIFICANT CHANGE UP
RSV RNA NPH QL NAA+NON-PROBE: SIGNIFICANT CHANGE UP
RSV RNA NPH QL NAA+NON-PROBE: SIGNIFICANT CHANGE UP
SARS-COV-2 RNA SPEC QL NAA+PROBE: SIGNIFICANT CHANGE UP
SARS-COV-2 RNA SPEC QL NAA+PROBE: SIGNIFICANT CHANGE UP

## 2023-12-03 PROCEDURE — 99284 EMERGENCY DEPT VISIT MOD MDM: CPT

## 2023-12-03 RX ORDER — ALBUTEROL 90 UG/1
1 AEROSOL, METERED ORAL ONCE
Refills: 0 | Status: COMPLETED | OUTPATIENT
Start: 2023-12-03 | End: 2023-12-03

## 2023-12-03 RX ADMIN — ALBUTEROL 1 PUFF(S): 90 AEROSOL, METERED ORAL at 09:41

## 2023-12-03 NOTE — ED ADULT TRIAGE NOTE - CHIEF COMPLAINT QUOTE
pt is 20wks preg.. with prod. cough started on Friday, nasal congestion, chills, denies vag. bleeding, no ABD pain.  Due date 4/19/24

## 2023-12-03 NOTE — ED PROVIDER NOTE - PATIENT PORTAL LINK FT
You can access the FollowMyHealth Patient Portal offered by [facility] by registering at the following website: [email or web address]. By joining [employer]’s FollowMyHealth portal, you will also be able to view your health information using other applications (apps) compatible with our system. You can access the FollowMyHealth Patient Portal offered by [facility] by registering at the following website: [email or web address]. By joining [employer]’s FollowMyHealth portal, you will also be able to view your health information using other applications (apps) compatible with our system. You can access the FollowMyHealth Patient Portal offered by [facility] by registering at the following website: [email or web address]. By joining [employer]’s FollowMyHealth portal, you will also be able to view your health information using other applications (apps) compatible with our system.

## 2023-12-03 NOTE — ED ADULT NURSE NOTE - NSFALLUNIVINTERV_ED_ALL_ED
Bed/Stretcher in lowest position, wheels locked, appropriate side rails in place/Call bell, personal items and telephone in reach/Instruct patient to call for assistance before getting out of bed/chair/stretcher/Non-slip footwear applied when patient is off stretcher/[place] to call system/Physically safe environment - no spills, clutter or unnecessary equipment/Purposeful proactive rounding/Room/bathroom lighting operational, light cord in reach Bed/Stretcher in lowest position, wheels locked, appropriate side rails in place/Call bell, personal items and telephone in reach/Instruct patient to call for assistance before getting out of bed/chair/stretcher/Non-slip footwear applied when patient is off stretcher/[place] to call system/Physically safe environment - no spills, clutter or unnecessary equipment/Purposeful proactive rounding/Room/bathroom lighting operational, light cord in reach Bed/Stretcher in lowest position, wheels locked, appropriate side rails in place/Call bell, personal items and telephone in reach/Instruct patient to call for assistance before getting out of bed/chair/stretcher/Non-slip footwear applied when patient is off stretcher/[place] to call system/Physically safe environment - no spills, clutter or unnecessary equipment/Purposeful proactive rounding/Room/bathroom lighting operational, light cord in reach

## 2023-12-03 NOTE — ED PROVIDER NOTE - NSFOLLOWUPINSTRUCTIONS_ED_ALL_ED_FT
Drink plenty of fluids and get plenty of rest.    You may take Tylenol 650mg every 6 hours as needed for fever/pain.    You may take guaifenesin as needed for cough (found over the counter).  Recommend honey and humidified air for cough as well.    You may also use the albuterol inhaler 2puffs every 4-6 hours.    If symptoms worsen, please return to the emergency department as needed.

## 2023-12-03 NOTE — ED PROVIDER NOTE - CLINICAL SUMMARY MEDICAL DECISION MAKING FREE TEXT BOX
38F  at 20wks gestation presents with URI sx.  Pt c/o 3 days of cough, sore throat, runny nose, watery eyes.  Had a fever x 1 day.  +Sick contacts at work.  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      Exam with clear lungs    A/P  - likely viral URI, will check for covid/flu as she may benefit from Tamiflu/Paxlovid 38F  at 20wks gestation presents with URI sx.  Pt c/o 3 days of cough, sore throat, runny nose, watery eyes.  Had a fever x 1 day.  +Sick contacts at work.  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      Exam with clear lungs, fetal heart rate 141bpm    A/P  - likely viral URI, will check for covid/flu as she may benefit from Tamiflu/Paxlovid

## 2023-12-03 NOTE — ED ADULT NURSE NOTE - OBJECTIVE STATEMENT
Patient received in stretcher. AOX4. Respirations even and unlabored. Spontaneous movement of all extremities noted. Presents to ER c/o flu like symptoms. + body aches + fevers + chills + cough Swab collected. No signs of acute distress noted  Comfort and safety maintained. All current care needs met. Care plan continued Edd CASTANO Patient received in stretcher. AOX4. Respirations even and unlabored. Spontaneous movement of all extremities noted. Presents to ER c/o flu like symptoms. + body aches + fevers + chills + cough Patient reports she is about 20 weeks preg. A sper patient she has not felt fetal movement yet throught this pregancy. Denies any pregnancy complaints. Swab collected. No signs of acute distress noted  Comfort and safety maintained. All current care needs met. Care plan continued Edd CASTANO

## 2023-12-06 ENCOUNTER — NON-APPOINTMENT (OUTPATIENT)
Age: 38
End: 2023-12-06

## 2023-12-07 ENCOUNTER — APPOINTMENT (OUTPATIENT)
Dept: MATERNAL FETAL MEDICINE | Facility: HOSPITAL | Age: 38
End: 2023-12-07
Payer: MEDICAID

## 2023-12-07 ENCOUNTER — ASOB RESULT (OUTPATIENT)
Age: 38
End: 2023-12-07

## 2023-12-07 ENCOUNTER — APPOINTMENT (OUTPATIENT)
Dept: OBGYN | Facility: HOSPITAL | Age: 38
End: 2023-12-07
Payer: MEDICAID

## 2023-12-07 ENCOUNTER — OUTPATIENT (OUTPATIENT)
Dept: OUTPATIENT SERVICES | Facility: HOSPITAL | Age: 38
LOS: 1 days | End: 2023-12-07

## 2023-12-07 VITALS
BODY MASS INDEX: 41.59 KG/M2 | DIASTOLIC BLOOD PRESSURE: 72 MMHG | SYSTOLIC BLOOD PRESSURE: 131 MMHG | HEART RATE: 82 BPM | WEIGHT: 226 LBS | HEIGHT: 62 IN | TEMPERATURE: 97.7 F

## 2023-12-07 PROCEDURE — 76811 OB US DETAILED SNGL FETUS: CPT | Mod: 26

## 2023-12-11 DIAGNOSIS — O09.292 SUPERVISION OF PREGNANCY WITH OTHER POOR REPRODUCTIVE OR OBSTETRIC HISTORY, SECOND TRIMESTER: ICD-10-CM

## 2024-01-07 ENCOUNTER — EMERGENCY (EMERGENCY)
Facility: HOSPITAL | Age: 39
LOS: 1 days | Discharge: HOME | End: 2024-01-07
Attending: EMERGENCY MEDICINE | Admitting: EMERGENCY MEDICINE
Payer: MEDICAID

## 2024-01-07 VITALS
DIASTOLIC BLOOD PRESSURE: 60 MMHG | SYSTOLIC BLOOD PRESSURE: 108 MMHG | RESPIRATION RATE: 18 BRPM | OXYGEN SATURATION: 100 % | TEMPERATURE: 98 F | HEART RATE: 93 BPM

## 2024-01-07 VITALS
TEMPERATURE: 98 F | RESPIRATION RATE: 16 BRPM | SYSTOLIC BLOOD PRESSURE: 122 MMHG | HEART RATE: 119 BPM | OXYGEN SATURATION: 100 % | DIASTOLIC BLOOD PRESSURE: 66 MMHG

## 2024-01-07 DIAGNOSIS — Z98.890 OTHER SPECIFIED POSTPROCEDURAL STATES: Chronic | ICD-10-CM

## 2024-01-07 LAB
ALBUMIN SERPL ELPH-MCNC: 3.3 G/DL — SIGNIFICANT CHANGE UP (ref 3.3–5)
ALBUMIN SERPL ELPH-MCNC: 3.3 G/DL — SIGNIFICANT CHANGE UP (ref 3.3–5)
ALP SERPL-CCNC: 101 U/L — SIGNIFICANT CHANGE UP (ref 40–120)
ALP SERPL-CCNC: 101 U/L — SIGNIFICANT CHANGE UP (ref 40–120)
ALT FLD-CCNC: 6 U/L — SIGNIFICANT CHANGE UP (ref 4–33)
ALT FLD-CCNC: 6 U/L — SIGNIFICANT CHANGE UP (ref 4–33)
ANION GAP SERPL CALC-SCNC: 13 MMOL/L — SIGNIFICANT CHANGE UP (ref 7–14)
ANION GAP SERPL CALC-SCNC: 13 MMOL/L — SIGNIFICANT CHANGE UP (ref 7–14)
ANISOCYTOSIS BLD QL: SLIGHT — SIGNIFICANT CHANGE UP
ANISOCYTOSIS BLD QL: SLIGHT — SIGNIFICANT CHANGE UP
APPEARANCE UR: ABNORMAL
APPEARANCE UR: ABNORMAL
AST SERPL-CCNC: 10 U/L — SIGNIFICANT CHANGE UP (ref 4–32)
AST SERPL-CCNC: 10 U/L — SIGNIFICANT CHANGE UP (ref 4–32)
B PERT DNA SPEC QL NAA+PROBE: SIGNIFICANT CHANGE UP
B PERT DNA SPEC QL NAA+PROBE: SIGNIFICANT CHANGE UP
B PERT+PARAPERT DNA PNL SPEC NAA+PROBE: SIGNIFICANT CHANGE UP
B PERT+PARAPERT DNA PNL SPEC NAA+PROBE: SIGNIFICANT CHANGE UP
BACTERIA # UR AUTO: ABNORMAL /HPF
BACTERIA # UR AUTO: ABNORMAL /HPF
BASOPHILS # BLD AUTO: 0 K/UL — SIGNIFICANT CHANGE UP (ref 0–0.2)
BASOPHILS # BLD AUTO: 0 K/UL — SIGNIFICANT CHANGE UP (ref 0–0.2)
BASOPHILS NFR BLD AUTO: 0 % — SIGNIFICANT CHANGE UP (ref 0–2)
BASOPHILS NFR BLD AUTO: 0 % — SIGNIFICANT CHANGE UP (ref 0–2)
BILIRUB SERPL-MCNC: <0.2 MG/DL — SIGNIFICANT CHANGE UP (ref 0.2–1.2)
BILIRUB SERPL-MCNC: <0.2 MG/DL — SIGNIFICANT CHANGE UP (ref 0.2–1.2)
BILIRUB UR-MCNC: NEGATIVE — SIGNIFICANT CHANGE UP
BILIRUB UR-MCNC: NEGATIVE — SIGNIFICANT CHANGE UP
BORDETELLA PARAPERTUSSIS (RAPRVP): SIGNIFICANT CHANGE UP
BORDETELLA PARAPERTUSSIS (RAPRVP): SIGNIFICANT CHANGE UP
BUN SERPL-MCNC: 5 MG/DL — LOW (ref 7–23)
BUN SERPL-MCNC: 5 MG/DL — LOW (ref 7–23)
C PNEUM DNA SPEC QL NAA+PROBE: SIGNIFICANT CHANGE UP
C PNEUM DNA SPEC QL NAA+PROBE: SIGNIFICANT CHANGE UP
CALCIUM SERPL-MCNC: 8.5 MG/DL — SIGNIFICANT CHANGE UP (ref 8.4–10.5)
CALCIUM SERPL-MCNC: 8.5 MG/DL — SIGNIFICANT CHANGE UP (ref 8.4–10.5)
CAST: 0 /LPF — SIGNIFICANT CHANGE UP (ref 0–4)
CAST: 0 /LPF — SIGNIFICANT CHANGE UP (ref 0–4)
CHLORIDE SERPL-SCNC: 106 MMOL/L — SIGNIFICANT CHANGE UP (ref 98–107)
CHLORIDE SERPL-SCNC: 106 MMOL/L — SIGNIFICANT CHANGE UP (ref 98–107)
CO2 SERPL-SCNC: 19 MMOL/L — LOW (ref 22–31)
CO2 SERPL-SCNC: 19 MMOL/L — LOW (ref 22–31)
COLOR SPEC: YELLOW — SIGNIFICANT CHANGE UP
COLOR SPEC: YELLOW — SIGNIFICANT CHANGE UP
CREAT SERPL-MCNC: 0.29 MG/DL — LOW (ref 0.5–1.3)
CREAT SERPL-MCNC: 0.29 MG/DL — LOW (ref 0.5–1.3)
DACRYOCYTES BLD QL SMEAR: SLIGHT — SIGNIFICANT CHANGE UP
DACRYOCYTES BLD QL SMEAR: SLIGHT — SIGNIFICANT CHANGE UP
DIFF PNL FLD: NEGATIVE — SIGNIFICANT CHANGE UP
DIFF PNL FLD: NEGATIVE — SIGNIFICANT CHANGE UP
EGFR: 140 ML/MIN/1.73M2 — SIGNIFICANT CHANGE UP
EGFR: 140 ML/MIN/1.73M2 — SIGNIFICANT CHANGE UP
EOSINOPHIL # BLD AUTO: 0.16 K/UL — SIGNIFICANT CHANGE UP (ref 0–0.5)
EOSINOPHIL # BLD AUTO: 0.16 K/UL — SIGNIFICANT CHANGE UP (ref 0–0.5)
EOSINOPHIL NFR BLD AUTO: 1.7 % — SIGNIFICANT CHANGE UP (ref 0–6)
EOSINOPHIL NFR BLD AUTO: 1.7 % — SIGNIFICANT CHANGE UP (ref 0–6)
FLUAV SUBTYP SPEC NAA+PROBE: SIGNIFICANT CHANGE UP
FLUAV SUBTYP SPEC NAA+PROBE: SIGNIFICANT CHANGE UP
FLUBV RNA SPEC QL NAA+PROBE: SIGNIFICANT CHANGE UP
FLUBV RNA SPEC QL NAA+PROBE: SIGNIFICANT CHANGE UP
GLUCOSE SERPL-MCNC: 148 MG/DL — HIGH (ref 70–99)
GLUCOSE SERPL-MCNC: 148 MG/DL — HIGH (ref 70–99)
GLUCOSE UR QL: NEGATIVE MG/DL — SIGNIFICANT CHANGE UP
GLUCOSE UR QL: NEGATIVE MG/DL — SIGNIFICANT CHANGE UP
HADV DNA SPEC QL NAA+PROBE: SIGNIFICANT CHANGE UP
HADV DNA SPEC QL NAA+PROBE: SIGNIFICANT CHANGE UP
HCOV 229E RNA SPEC QL NAA+PROBE: SIGNIFICANT CHANGE UP
HCOV 229E RNA SPEC QL NAA+PROBE: SIGNIFICANT CHANGE UP
HCOV HKU1 RNA SPEC QL NAA+PROBE: SIGNIFICANT CHANGE UP
HCOV HKU1 RNA SPEC QL NAA+PROBE: SIGNIFICANT CHANGE UP
HCOV NL63 RNA SPEC QL NAA+PROBE: SIGNIFICANT CHANGE UP
HCOV NL63 RNA SPEC QL NAA+PROBE: SIGNIFICANT CHANGE UP
HCOV OC43 RNA SPEC QL NAA+PROBE: SIGNIFICANT CHANGE UP
HCOV OC43 RNA SPEC QL NAA+PROBE: SIGNIFICANT CHANGE UP
HCT VFR BLD CALC: 32.6 % — LOW (ref 34.5–45)
HCT VFR BLD CALC: 32.6 % — LOW (ref 34.5–45)
HGB BLD-MCNC: 10.6 G/DL — LOW (ref 11.5–15.5)
HGB BLD-MCNC: 10.6 G/DL — LOW (ref 11.5–15.5)
HMPV RNA SPEC QL NAA+PROBE: SIGNIFICANT CHANGE UP
HMPV RNA SPEC QL NAA+PROBE: SIGNIFICANT CHANGE UP
HPIV1 RNA SPEC QL NAA+PROBE: SIGNIFICANT CHANGE UP
HPIV1 RNA SPEC QL NAA+PROBE: SIGNIFICANT CHANGE UP
HPIV2 RNA SPEC QL NAA+PROBE: SIGNIFICANT CHANGE UP
HPIV2 RNA SPEC QL NAA+PROBE: SIGNIFICANT CHANGE UP
HPIV3 RNA SPEC QL NAA+PROBE: SIGNIFICANT CHANGE UP
HPIV3 RNA SPEC QL NAA+PROBE: SIGNIFICANT CHANGE UP
HPIV4 RNA SPEC QL NAA+PROBE: SIGNIFICANT CHANGE UP
HPIV4 RNA SPEC QL NAA+PROBE: SIGNIFICANT CHANGE UP
IANC: 7.08 K/UL — SIGNIFICANT CHANGE UP (ref 1.8–7.4)
IANC: 7.08 K/UL — SIGNIFICANT CHANGE UP (ref 1.8–7.4)
KETONES UR-MCNC: NEGATIVE MG/DL — SIGNIFICANT CHANGE UP
KETONES UR-MCNC: NEGATIVE MG/DL — SIGNIFICANT CHANGE UP
LEUKOCYTE ESTERASE UR-ACNC: ABNORMAL
LEUKOCYTE ESTERASE UR-ACNC: ABNORMAL
LYMPHOCYTES # BLD AUTO: 1.07 K/UL — SIGNIFICANT CHANGE UP (ref 1–3.3)
LYMPHOCYTES # BLD AUTO: 1.07 K/UL — SIGNIFICANT CHANGE UP (ref 1–3.3)
LYMPHOCYTES # BLD AUTO: 11.3 % — LOW (ref 13–44)
LYMPHOCYTES # BLD AUTO: 11.3 % — LOW (ref 13–44)
M PNEUMO DNA SPEC QL NAA+PROBE: SIGNIFICANT CHANGE UP
M PNEUMO DNA SPEC QL NAA+PROBE: SIGNIFICANT CHANGE UP
MAGNESIUM SERPL-MCNC: 1.6 MG/DL — SIGNIFICANT CHANGE UP (ref 1.6–2.6)
MAGNESIUM SERPL-MCNC: 1.6 MG/DL — SIGNIFICANT CHANGE UP (ref 1.6–2.6)
MANUAL SMEAR VERIFICATION: SIGNIFICANT CHANGE UP
MANUAL SMEAR VERIFICATION: SIGNIFICANT CHANGE UP
MCHC RBC-ENTMCNC: 27.5 PG — SIGNIFICANT CHANGE UP (ref 27–34)
MCHC RBC-ENTMCNC: 27.5 PG — SIGNIFICANT CHANGE UP (ref 27–34)
MCHC RBC-ENTMCNC: 32.5 GM/DL — SIGNIFICANT CHANGE UP (ref 32–36)
MCHC RBC-ENTMCNC: 32.5 GM/DL — SIGNIFICANT CHANGE UP (ref 32–36)
MCV RBC AUTO: 84.5 FL — SIGNIFICANT CHANGE UP (ref 80–100)
MCV RBC AUTO: 84.5 FL — SIGNIFICANT CHANGE UP (ref 80–100)
METAMYELOCYTES # FLD: 0.9 % — SIGNIFICANT CHANGE UP (ref 0–1)
METAMYELOCYTES # FLD: 0.9 % — SIGNIFICANT CHANGE UP (ref 0–1)
MICROCYTES BLD QL: SLIGHT — SIGNIFICANT CHANGE UP
MICROCYTES BLD QL: SLIGHT — SIGNIFICANT CHANGE UP
MONOCYTES # BLD AUTO: 0.42 K/UL — SIGNIFICANT CHANGE UP (ref 0–0.9)
MONOCYTES # BLD AUTO: 0.42 K/UL — SIGNIFICANT CHANGE UP (ref 0–0.9)
MONOCYTES NFR BLD AUTO: 4.4 % — SIGNIFICANT CHANGE UP (ref 2–14)
MONOCYTES NFR BLD AUTO: 4.4 % — SIGNIFICANT CHANGE UP (ref 2–14)
MYELOCYTES NFR BLD: 1.7 % — HIGH (ref 0–0)
MYELOCYTES NFR BLD: 1.7 % — HIGH (ref 0–0)
NEUTROPHILS # BLD AUTO: 7.33 K/UL — SIGNIFICANT CHANGE UP (ref 1.8–7.4)
NEUTROPHILS # BLD AUTO: 7.33 K/UL — SIGNIFICANT CHANGE UP (ref 1.8–7.4)
NEUTROPHILS NFR BLD AUTO: 75.7 % — SIGNIFICANT CHANGE UP (ref 43–77)
NEUTROPHILS NFR BLD AUTO: 75.7 % — SIGNIFICANT CHANGE UP (ref 43–77)
NEUTS BAND # BLD: 1.7 % — SIGNIFICANT CHANGE UP (ref 0–6)
NEUTS BAND # BLD: 1.7 % — SIGNIFICANT CHANGE UP (ref 0–6)
NITRITE UR-MCNC: POSITIVE
NITRITE UR-MCNC: POSITIVE
NT-PROBNP SERPL-SCNC: <36 PG/ML — SIGNIFICANT CHANGE UP
NT-PROBNP SERPL-SCNC: <36 PG/ML — SIGNIFICANT CHANGE UP
PH UR: 6.5 — SIGNIFICANT CHANGE UP (ref 5–8)
PH UR: 6.5 — SIGNIFICANT CHANGE UP (ref 5–8)
PHOSPHATE SERPL-MCNC: 2.8 MG/DL — SIGNIFICANT CHANGE UP (ref 2.5–4.5)
PHOSPHATE SERPL-MCNC: 2.8 MG/DL — SIGNIFICANT CHANGE UP (ref 2.5–4.5)
PLAT MORPH BLD: NORMAL — SIGNIFICANT CHANGE UP
PLAT MORPH BLD: NORMAL — SIGNIFICANT CHANGE UP
PLATELET # BLD AUTO: 269 K/UL — SIGNIFICANT CHANGE UP (ref 150–400)
PLATELET # BLD AUTO: 269 K/UL — SIGNIFICANT CHANGE UP (ref 150–400)
PLATELET COUNT - ESTIMATE: NORMAL — SIGNIFICANT CHANGE UP
PLATELET COUNT - ESTIMATE: NORMAL — SIGNIFICANT CHANGE UP
POIKILOCYTOSIS BLD QL AUTO: SLIGHT — SIGNIFICANT CHANGE UP
POIKILOCYTOSIS BLD QL AUTO: SLIGHT — SIGNIFICANT CHANGE UP
POTASSIUM SERPL-MCNC: 3.8 MMOL/L — SIGNIFICANT CHANGE UP (ref 3.5–5.3)
POTASSIUM SERPL-MCNC: 3.8 MMOL/L — SIGNIFICANT CHANGE UP (ref 3.5–5.3)
POTASSIUM SERPL-SCNC: 3.8 MMOL/L — SIGNIFICANT CHANGE UP (ref 3.5–5.3)
POTASSIUM SERPL-SCNC: 3.8 MMOL/L — SIGNIFICANT CHANGE UP (ref 3.5–5.3)
PROT SERPL-MCNC: 6.4 G/DL — SIGNIFICANT CHANGE UP (ref 6–8.3)
PROT SERPL-MCNC: 6.4 G/DL — SIGNIFICANT CHANGE UP (ref 6–8.3)
PROT UR-MCNC: NEGATIVE MG/DL — SIGNIFICANT CHANGE UP
PROT UR-MCNC: NEGATIVE MG/DL — SIGNIFICANT CHANGE UP
RAPID RVP RESULT: DETECTED
RAPID RVP RESULT: DETECTED
RBC # BLD: 3.86 M/UL — SIGNIFICANT CHANGE UP (ref 3.8–5.2)
RBC # BLD: 3.86 M/UL — SIGNIFICANT CHANGE UP (ref 3.8–5.2)
RBC # FLD: 14.6 % — HIGH (ref 10.3–14.5)
RBC # FLD: 14.6 % — HIGH (ref 10.3–14.5)
RBC BLD AUTO: ABNORMAL
RBC BLD AUTO: ABNORMAL
RBC CASTS # UR COMP ASSIST: 1 /HPF — SIGNIFICANT CHANGE UP (ref 0–4)
RBC CASTS # UR COMP ASSIST: 1 /HPF — SIGNIFICANT CHANGE UP (ref 0–4)
RSV RNA SPEC QL NAA+PROBE: DETECTED
RSV RNA SPEC QL NAA+PROBE: DETECTED
RV+EV RNA SPEC QL NAA+PROBE: SIGNIFICANT CHANGE UP
RV+EV RNA SPEC QL NAA+PROBE: SIGNIFICANT CHANGE UP
SARS-COV-2 RNA SPEC QL NAA+PROBE: SIGNIFICANT CHANGE UP
SARS-COV-2 RNA SPEC QL NAA+PROBE: SIGNIFICANT CHANGE UP
SMUDGE CELLS # BLD: PRESENT — SIGNIFICANT CHANGE UP
SMUDGE CELLS # BLD: PRESENT — SIGNIFICANT CHANGE UP
SODIUM SERPL-SCNC: 138 MMOL/L — SIGNIFICANT CHANGE UP (ref 135–145)
SODIUM SERPL-SCNC: 138 MMOL/L — SIGNIFICANT CHANGE UP (ref 135–145)
SP GR SPEC: 1.02 — SIGNIFICANT CHANGE UP (ref 1–1.03)
SP GR SPEC: 1.02 — SIGNIFICANT CHANGE UP (ref 1–1.03)
SPHEROCYTES BLD QL SMEAR: SLIGHT — SIGNIFICANT CHANGE UP
SPHEROCYTES BLD QL SMEAR: SLIGHT — SIGNIFICANT CHANGE UP
SQUAMOUS # UR AUTO: 4 /HPF — SIGNIFICANT CHANGE UP (ref 0–5)
SQUAMOUS # UR AUTO: 4 /HPF — SIGNIFICANT CHANGE UP (ref 0–5)
TROPONIN T, HIGH SENSITIVITY RESULT: <6 NG/L — SIGNIFICANT CHANGE UP
TROPONIN T, HIGH SENSITIVITY RESULT: <6 NG/L — SIGNIFICANT CHANGE UP
UROBILINOGEN FLD QL: 0.2 MG/DL — SIGNIFICANT CHANGE UP (ref 0.2–1)
UROBILINOGEN FLD QL: 0.2 MG/DL — SIGNIFICANT CHANGE UP (ref 0.2–1)
VARIANT LYMPHS # BLD: 2.6 % — SIGNIFICANT CHANGE UP (ref 0–6)
VARIANT LYMPHS # BLD: 2.6 % — SIGNIFICANT CHANGE UP (ref 0–6)
WBC # BLD: 9.47 K/UL — SIGNIFICANT CHANGE UP (ref 3.8–10.5)
WBC # BLD: 9.47 K/UL — SIGNIFICANT CHANGE UP (ref 3.8–10.5)
WBC # FLD AUTO: 9.47 K/UL — SIGNIFICANT CHANGE UP (ref 3.8–10.5)
WBC # FLD AUTO: 9.47 K/UL — SIGNIFICANT CHANGE UP (ref 3.8–10.5)
WBC UR QL: 8 /HPF — HIGH (ref 0–5)
WBC UR QL: 8 /HPF — HIGH (ref 0–5)

## 2024-01-07 PROCEDURE — 99285 EMERGENCY DEPT VISIT HI MDM: CPT

## 2024-01-07 PROCEDURE — 93010 ELECTROCARDIOGRAM REPORT: CPT

## 2024-01-07 RX ORDER — SODIUM CHLORIDE 9 MG/ML
1000 INJECTION INTRAMUSCULAR; INTRAVENOUS; SUBCUTANEOUS ONCE
Refills: 0 | Status: COMPLETED | OUTPATIENT
Start: 2024-01-07 | End: 2024-01-07

## 2024-01-07 RX ORDER — ALBUTEROL 90 UG/1
2 AEROSOL, METERED ORAL ONCE
Refills: 0 | Status: COMPLETED | OUTPATIENT
Start: 2024-01-07 | End: 2024-01-07

## 2024-01-07 RX ORDER — CEFTRIAXONE 500 MG/1
1000 INJECTION, POWDER, FOR SOLUTION INTRAMUSCULAR; INTRAVENOUS ONCE
Refills: 0 | Status: COMPLETED | OUTPATIENT
Start: 2024-01-07 | End: 2024-01-07

## 2024-01-07 RX ORDER — FAMOTIDINE 10 MG/ML
20 INJECTION INTRAVENOUS ONCE
Refills: 0 | Status: COMPLETED | OUTPATIENT
Start: 2024-01-07 | End: 2024-01-07

## 2024-01-07 RX ORDER — CEFPODOXIME PROXETIL 100 MG
1 TABLET ORAL
Qty: 14 | Refills: 0
Start: 2024-01-07 | End: 2024-01-13

## 2024-01-07 RX ORDER — ACETAMINOPHEN 500 MG
975 TABLET ORAL ONCE
Refills: 0 | Status: COMPLETED | OUTPATIENT
Start: 2024-01-07 | End: 2024-01-07

## 2024-01-07 RX ADMIN — CEFTRIAXONE 100 MILLIGRAM(S): 500 INJECTION, POWDER, FOR SOLUTION INTRAMUSCULAR; INTRAVENOUS at 12:38

## 2024-01-07 RX ADMIN — FAMOTIDINE 20 MILLIGRAM(S): 10 INJECTION INTRAVENOUS at 11:19

## 2024-01-07 RX ADMIN — Medication 975 MILLIGRAM(S): at 09:51

## 2024-01-07 RX ADMIN — SODIUM CHLORIDE 1000 MILLILITER(S): 9 INJECTION INTRAMUSCULAR; INTRAVENOUS; SUBCUTANEOUS at 12:37

## 2024-01-07 RX ADMIN — ALBUTEROL 2 PUFF(S): 90 AEROSOL, METERED ORAL at 12:38

## 2024-01-07 RX ADMIN — Medication 975 MILLIGRAM(S): at 12:21

## 2024-01-07 RX ADMIN — SODIUM CHLORIDE 1000 MILLILITER(S): 9 INJECTION INTRAMUSCULAR; INTRAVENOUS; SUBCUTANEOUS at 09:53

## 2024-01-07 NOTE — OB PERINATAL HUDDLE NOTE - NS_HUDDLECOMMENTS_OBGYN_ALL_OB_FT
Addendum     NST: Baseline 130, moderate variability,+accels, - decels   acontractile   Reactive    Continue care as per ED     d/w Dr. Marco Padilla, PGY4

## 2024-01-07 NOTE — OB PERINATAL HUDDLE NOTE - NS_HUDDLERESPONDERS_OBGYN_ALL_OB_FT
Detail Level: Detailed Danii Padilla, PGY4   Dr.Renee Lara (Attending ) Detail Level: Zone Detail Level: Generalized

## 2024-01-07 NOTE — OB PERINATAL HUDDLE NOTE - NS_HUDDLEASSDIAG_OBGYN_ALL_OB_FT
39 yo  @ 25w 2d presenting with productive cough since Thursday. Patient states that she did not have any known sick contacts, but she has been feeling unwell since. The cough has led to reproducible chest pain and sob, especially when she has a coughing fit. Patientunable to sleep secondary to the coughing. Patient also with a temp (100.1 at home) . –VB, -LOF, -Ctx, +FM. denies fever, chills, nausea, vomiting, diarrhea, headache, constipation, dizziness, syncope,  palpitations, dysuria, urgency, frequency.    PNC: [facility]  ObHx[place] SABx2, , . [place] 15  GynHx: denies fibroids, cysts, STD/STI, abn pap   MedHx: denies  SrgHx: denies  SocialHx: denies smoking drinking and drugs   AllergyHx: denies  RxHx: PNV, ASA 39 yo  @ 25w 2d presenting with productive cough since Thursday. Patient states that she did not have any known sick contacts, but she has been feeling unwell since. The cough has led to reproducible chest pain and sob, especially when she has a coughing fit. Patientunable to sleep secondary to the coughing. Patient also with a temp (100.1 at home) . –VB, -LOF, -Ctx, +FM. denies fever, chills, nausea, vomiting, diarrhea, headache, constipation, dizziness, syncope,  palpitations, dysuria, urgency, frequency.    PNC: [facility]  ObHx: SABx2, , . [place] 15  GynHx: denies fibroids, cysts, STD/STI, abn pap   MedHx: denies  SrgHx: denies  SocialHx: denies smoking drinking and drugs   AllergyHx: denies  RxHx: PNV, ASA

## 2024-01-07 NOTE — ED ADULT NURSE NOTE - NSFALLUNIVINTERV_ED_ALL_ED
Bed/Stretcher in lowest position, wheels locked, appropriate side rails in place/Call bell, personal items and telephone in reach/Instruct patient to call for assistance before getting out of bed/chair/stretcher/Non-slip footwear applied when patient is off stretcher/[place] to call system/Physically safe environment - no spills, clutter or unnecessary equipment/Purposeful proactive rounding/Room/bathroom lighting operational, light cord in reach Bed/Stretcher in lowest position, wheels locked, appropriate side rails in place/Call bell, personal items and telephone in reach/Instruct patient to call for assistance before getting out of bed/chair/stretcher/Non-slip footwear applied when patient is off stretcher/[place] to call system/Physically safe environment - no spills, clutter or unnecessary equipment/Purposeful proactive rounding/Room/bathroom lighting operational, light cord in reach

## 2024-01-07 NOTE — PROVIDER CONTACT NOTE (OTHER) - SITUATION
patient in ED for SOB and upper respiratory symptoms since thursday.  PAtient is 25 weeks.   huddle performed with Dr. guidry and Dr. Lara

## 2024-01-07 NOTE — ED ADULT NURSE NOTE - CHIEF COMPLAINT QUOTE
25 weeks pregnant, , pt of Dr. Baldwin, c/o URI symptoms x 2 days now c/o increased chest pain, shortness of breath and wheezing. Denies medical history. Pt has no OB complaints at this time, feels baby move and denies vaginal leakage. L&D called, pt to be seen in ED as a  huddle as per L&D. Charge made aware.

## 2024-01-07 NOTE — ED PROVIDER NOTE - CARE PROVIDERS DIRECT ADDRESSES
[email or web address]

## 2024-01-07 NOTE — ED PROVIDER NOTE - OBJECTIVE STATEMENT
Patient is a 38 year-old-female with no PMH  currently 25 weeks pregnant presents with 2-day history of URI symptoms and chest pain/sob. Reports that she started feeling sick on Friday, with nasal congestion and coughing. Then started having tightness in the midsternal chest with some associated shortness of breath. Denies abdominal pain, vaginal bleeding/discharge. Feels that the baby is moving normally.   SEGUNDO Baldwin; appointment on 24

## 2024-01-07 NOTE — ED PROVIDER NOTE - CLINICAL SUMMARY MEDICAL DECISION MAKING FREE TEXT BOX
Patient is a 38 year-old-female with no PMH  currently 25 weeks pregnant presents with 2-day history of URI symptoms and chest pain/sob. Likely viral syndrome. Low suspicion for acute ACS/PE. Workup includes labs, ECG, CXR and viral swab. Hydration and reassess.  huddle called at triage and consulted OBGYN. NST initiated by OBGYN.

## 2024-01-07 NOTE — ED PROVIDER NOTE - CARE PROVIDER_API CALL
Norma Baldwin  Obstetrics and Gynecology  [address] [place] [address], [address]  [place] [address]  Phone: [phone]  Fax: [phone]  Follow Up Time:    Norma Baldwin  Obstetrics and Gynecology  [address] [place] [address], [address]  [place] [address]  Phone: [phone]  Fax: [phone]  Follow Up Time:

## 2024-01-07 NOTE — ED PROVIDER NOTE - ATTENDING CONTRIBUTION TO CARE
Patient is a 38-year-old female G3, P0, with no chronic medical problems, no history of smoking, currently 25 weeks pregnant, follows with Dr. Baldwin, here for evaluation of fever, cough, congestion, chest pressure and shortness of breath.  Patient states symptoms started 4 days ago with a runny nose and has progressed.  She reports that she has had posttussive vomiting.  No diarrhea.  The chest pressure feels like a tightness and she feels she is wheezing.  No history of asthma.  She denies any known sick contacts.  Denies any calf pain.  No abdominal pain.  No vaginal bleeding or urinary symptoms.  Patient reports she had a fever of 100.2 last night.    VS noted  Gen. no acute distress, Non toxic   HEENT: EOMI, mmm  Lungs: slightly rhodochrous, wheezes  CVS: tachycardic  Abd; Soft non tender, non distended   Ext: minimal lower extremity edema, no calf tenderness  Skin: no rash  Neuro AAOx3 non focal clear speech  a/p:   Fever, cough, congestion, chest pressure–EKG reviewed and is  sinus tachycardia without ST elevations or depressions.  Patient is saturating well on room air.  This is likely a viral syndrome.  Patient states that she had posttussive vomiting seeming like and feels nauseous with eating.  Plan for IV fluids, labs.  A  huddle was called secondary to patient's advanced gestational age, tachycardia and complaint of chest pressure and shortness of breath. NST was set up at the bedside.  OB/GYN was informed of the call from triage and will see patient.  - Carmela DWYER

## 2024-01-07 NOTE — ED PROVIDER NOTE - WET READ LAUNCH FT
Lung nodule that was previously noted on calcium heart scan in the lingula unchanged.  This scan is better able to visualize the lung and now shows multiple small pulmonary nodules and axillary lymph nodes.  Also, no evidence of aortic enlargement on this scan.  Will plan on repeat lung CT in 6 months (ordered).  Will also review this with his oncologist, Dr. Del Rio.   There are no Wet Read(s) to document.

## 2024-01-07 NOTE — ED PROVIDER NOTE - PROGRESS NOTE DETAILS
Roshan PGY3  RSV+ and UA concerning for UTI and will give ceftriaxone. Bloodwork unremarkable. Patient reassessed and reports feeling better. HR ~90s. Will give another fluid bolus and reassess. Roshan PGY3  Patient reports feeling better. Return precautions reviewed and recommended follow up with OBGYN. Prescription for cefpodoxime sent to pharmacy.

## 2024-01-07 NOTE — OB PERINATAL HUDDLE NOTE - NS_HUDDLEPLAN_OBGYN_ALL_OB_FT
Patient with BPP 8/8 , NST being performed at beside   VSS   Recommed RVP   Continue workup by ED    Dr. Lara at bedside   Danii Padilla, pgy4

## 2024-01-07 NOTE — ED PROVIDER NOTE - PATIENT PORTAL LINK FT
You can access the FollowMyHealth Patient Portal offered by [facility] by registering at the following website: [email or web address]. By joining [employer]’s FollowMyHealth portal, you will also be able to view your health information using other applications (apps) compatible with our system. You can access the FollowMyHealth Patient Portal offered by [facility] by registering at the following website: [email or web address]. By joining [employer]’s FollowMyHealth portal, you will also be able to view your health information using other applications (apps) compatible with our system.

## 2024-01-07 NOTE — ED PROVIDER NOTE - NSFOLLOWUPINSTRUCTIONS_ED_ALL_ED_FT
You were seen in the emergency department for coughing, chest pain, shortness of breath and fever. Your workup in the emergency department includes bloodwork, ECG and viral swab. You can find the results of all the tests in this discharge packet. Please follow up with Dr. Baldwin for continuation of care.     Return to the emergency department if you experience any new/concerning/worsening symptoms such as but not limited to: fever (>100.3F), intractable nausea, vomiting, chest pain, shortness of breath, abdominal pain, abnormal vaginal bleeding or discharge, urinary symptoms. You were seen in the emergency department for coughing, chest pain, shortness of breath and fever. Your workup in the emergency department includes bloodwork, ECG, urine study and viral swab. You can find the results of all the tests in this discharge packet. Please follow up with Dr. Baldwin for continuation of care.     Return to the emergency department if you experience any new/concerning/worsening symptoms such as but not limited to: fever (>100.3F), intractable nausea, vomiting, chest pain, shortness of breath, abdominal pain, abnormal vaginal bleeding or discharge, urinary symptoms.    You are prescribed:  1) Cefpodoxime: take 200mg every 12 hours for 7 days   *Please finish the whole course of antibiotics*

## 2024-01-07 NOTE — ED PROVIDER NOTE - PHYSICAL EXAMINATION
Vitals: I have reviewed the patients vital signs  General: Well dressed, well appearing, no acute distress  HEENT: Atraumatic, normocephalic, airway patent  Eyes: EOMI, tracking appropriately  Neck: no tracheal deviation, no JVD  Chest/Lungs: no trauma, symmetric chest rise, speaking in complete sentences, no WOB, no wheezing/crackles appreciated   Heart: skin and extremities well perfused, tachycardia, no LE edema   Abdomen: soft, nontender and nondistended   Neuro: A+Ox3, ambulating without difficulty, CN grossly intact  MSK: strength at baseline in all extremities, no muscle wasting or atrophy  Skin: no cyanosis, no jaundice, no new emergent lesions Vitals: I have reviewed the patients vital signs  General: Well dressed, well appearing, no acute distress  HEENT: Atraumatic, normocephalic, airway patent  Eyes: EOMI, tracking appropriately  Neck: no tracheal deviation, no JVD  Chest/Lungs: no trauma, symmetric chest rise, speaking in complete sentences, no WOB, no wheezing/crackles appreciated   Heart: skin and extremities well perfused, tachycardia, no LE edema   Abdomen: soft, nontender and nondistended, gravid  Neuro: A+Ox3, ambulating without difficulty, CN grossly intact  MSK: strength at baseline in all extremities, no muscle wasting or atrophy  Skin: no cyanosis, no jaundice, no new emergent lesions

## 2024-01-07 NOTE — ED ADULT TRIAGE NOTE - CHIEF COMPLAINT QUOTE
25 weeks pregnant, , pt of Dr. Baldwin, c/o URI symptoms x 2 days now c/o increased chest pain, shortness of breath and wheezing. Denies medical history. Pt has no OB complaints at this time, feels baby move and denies vaginal leakage. L&D called, pt to be seen in ED as a  huddle. 25 weeks pregnant, , pt of Dr. Baldwin, c/o URI symptoms x 2 days now c/o increased chest pain, shortness of breath and wheezing. Denies medical history. Pt has no OB complaints at this time, feels baby move and denies vaginal leakage. L&D called, pt to be seen in ED as a  huddle. Charge made aware. 25 weeks pregnant, , pt of Dr. Baldwin, c/o URI symptoms x 2 days now c/o increased chest pain, shortness of breath and wheezing. Denies medical history. Pt has no OB complaints at this time, feels baby move and denies vaginal leakage. L&D called, pt to be seen in ED as a  huddle as per L&D. Charge made aware.

## 2024-01-09 ENCOUNTER — EMERGENCY (EMERGENCY)
Facility: HOSPITAL | Age: 39
LOS: 1 days | Discharge: STILL ADMITTED OR ADMINISTRATIVE | End: 2024-01-09
Admitting: EMERGENCY MEDICINE
Payer: SELF-PAY

## 2024-01-09 ENCOUNTER — OUTPATIENT (OUTPATIENT)
Dept: INPATIENT UNIT | Facility: HOSPITAL | Age: 39
LOS: 1 days | Discharge: HOME | End: 2024-01-09
Payer: MEDICAID

## 2024-01-09 VITALS
SYSTOLIC BLOOD PRESSURE: 141 MMHG | HEART RATE: 111 BPM | OXYGEN SATURATION: 97 % | DIASTOLIC BLOOD PRESSURE: 84 MMHG | RESPIRATION RATE: 20 BRPM | TEMPERATURE: 98 F

## 2024-01-09 VITALS
DIASTOLIC BLOOD PRESSURE: 69 MMHG | HEART RATE: 100 BPM | RESPIRATION RATE: 18 BRPM | SYSTOLIC BLOOD PRESSURE: 119 MMHG | TEMPERATURE: 99 F

## 2024-01-09 DIAGNOSIS — O26.899 OTHER SPECIFIED PREGNANCY RELATED CONDITIONS, UNSPECIFIED TRIMESTER: ICD-10-CM

## 2024-01-09 DIAGNOSIS — Z98.890 OTHER SPECIFIED POSTPROCEDURAL STATES: Chronic | ICD-10-CM

## 2024-01-09 PROCEDURE — 59025 FETAL NON-STRESS TEST: CPT | Mod: 26

## 2024-01-09 PROCEDURE — 99222 1ST HOSP IP/OBS MODERATE 55: CPT | Mod: 25

## 2024-01-09 PROCEDURE — L9996: CPT

## 2024-01-09 NOTE — OB RN TRIAGE NOTE - FALL HARM RISK - UNIVERSAL INTERVENTIONS
Bed in lowest position, wheels locked, appropriate side rails in place/Call bell, personal items and telephone in reach/Instruct patient to call for assistance before getting out of bed or chair/Non-slip footwear when patient is out of bed/[place] to call system/Physically safe environment - no spills, clutter or unnecessary equipment/Purposeful Proactive Rounding/Room/bathroom lighting operational, light cord in reach Bed in lowest position, wheels locked, appropriate side rails in place/Call bell, personal items and telephone in reach/Instruct patient to call for assistance before getting out of bed or chair/Non-slip footwear when patient is out of bed/[place] to call system/Physically safe environment - no spills, clutter or unnecessary equipment/Purposeful Proactive Rounding/Room/bathroom lighting operational, light cord in reach

## 2024-01-09 NOTE — ED ADULT TRIAGE NOTE - NS ED NURSE NOTE DISPO AOU
INTERNAL MEDICINE PROGRESS NOTE    NAME:  Amanda Newsome   :   1950   MRN:   [id number]     Date/Time:  2023 5:55 AM  Subjective:   History:  Chart reviewed and patient seen and examined and D/W his nurse this AM and all events noted. He is followed by me for HTN, HLD, ESRD on dialysis, Gout, chronic hepatitis C, DJD, Anxiety and other medical problems. He was seen by me at the office on  with SOB post Covid infection in December and found to have bilateral Pneumonia with acute hypoxemic respiratory failure and thus admitted. This AM he continues to feel less short of breath since steroids added . He still has a little nonproductive cough. He has noted no fevers or chills and none have been recorded. There are no other cardiac or respiratory complaints. He notes no GI or  complaints. He notes no neurologic complaints and the previous weakness and lightheaded sensation has resolved. The remainder complete review of systems is negative.     Medications reviewed:  Current Facility-Administered Medications   Medication Dose Route Frequency    methylPREDNISolone (PF) (SOLU-MEDROL) injection 20 mg  20 mg IntraVENous Q8H    albuterol-ipratropium (DUO-NEB) 2.5 MG-0.5 MG/3 ML  3 mL Nebulization Q4H PRN    azithromycin (ZITHROMAX) 500 mg in 0.9% sodium chloride 250 mL ([id number])  500 mg IntraVENous Q24H    epoetin jade-epbx (RETACRIT) injection 10,000 Units  10,000 Units SubCUTAneous Q TUE, THU & SAT    albuterol (PROVENTIL HFA, VENTOLIN HFA, PROAIR HFA) inhaler 2 Puff  2 Puff Inhalation Q6H PRN    amLODIPine (NORVASC) tablet 10 mg  10 mg Oral DAILY    carvediloL (COREG) tablet 12.5 mg  12.5 mg Oral BID WITH MEALS    lisinopriL (PRINIVIL, ZESTRIL) tablet 20 mg  20 mg Oral DAILY    LORazepam (ATIVAN) tablet 0.5 mg  0.5 mg Oral BID PRN    sevelamer carbonate (RENVELA) tab 800 mg  800 mg Oral TID WITH MEALS    tiZANidine (ZANAFLEX) tablet 4 mg  4 mg Oral TID PRN    sodium chloride (NS) flush 5-40 mL 5-40 mL IntraVENous Q8H    sodium chloride (NS) flush 5-40 mL  5-40 mL IntraVENous PRN    acetaminophen (TYLENOL) tablet 650 mg  650 mg Oral Q6H PRN    Or    acetaminophen (TYLENOL) suppository 650 mg  650 mg Rectal Q6H PRN    polyethylene glycol (MIRALAX) packet 17 g  17 g Oral DAILY PRN    ondansetron (ZOFRAN ODT) tablet 4 mg  4 mg Oral Q8H PRN    Or    ondansetron (ZOFRAN) injection 4 mg  4 mg IntraVENous Q6H PRN    heparin (porcine) injection 5,000 Units  5,000 Units SubCUTAneous Q8H    cefTRIAXone (ROCEPHIN) 1 g in 0.9% sodium chloride (MBP/ADV) 50 mL MBP  1 g IntraVENous Q24H        Objective:   Vitals:  Visit Vitals  /62   Pulse 76   Temp 97.8 °F (36.6 °C)   Resp 16   Ht 5' 9\" (1.753 m)   Wt 173 lb (78.5 kg)   SpO2 100%   BMI 25.55 kg/m²    O2 Flow Rate (L/min): 2 l/min O2 Device: Nasal cannula Temp (24hrs), Av.7 °F (36.5 °C), Min:97.2 °F (36.2 °C), Max:98.2 °F (36.8 °C)      Last 24hr Input/Output:    Intake/Output Summary (Last 24 hours) at 2023 0555  Last data filed at 2023 2325  Gross per 24 hour   Intake --   Output 0 ml   Net 0 ml          PHYSICAL EXAM:  General:     Alert, cooperative, no distress, appears stated age. Head:    Normocephalic, without obvious abnormality, atraumatic. Eyes:    Conjunctivae/corneas clear. PERRLA  Nose:   Nares normal. No drainage or sinus tenderness. Throat:     Lips, mucosa, and tongue normal.  No Thrush  Neck:   Supple, symmetrical,  no adenopathy, thyroid: non tender     no carotid bruit and no JVD. Back:     Symmetric,  No CVA tenderness. Lungs:    Clear to auscultation bilaterally except occasional scattered rhonchi. No Wheezing or Rhonchi. No rales. Heart:    Regular rate and rhythm,  no murmur, rub or gallop. Abdomen:    Soft, non-tender. Not distended. Bowel sounds normal. No masses  Extremities:  Extremities normal, atraumatic, No cyanosis. No edema.  No clubbing  Lymph nodes:  Cervical, supraclavicular normal.  Neurologic: Normal strength, Alert and oriented X 3. Skin:                No rash      Lab Data Reviewed:    Recent Results (from the past 24 hour(s))   CBC WITH AUTOMATED DIFF    Collection Time: 01/26/23  3:59 AM   Result Value Ref Range    WBC 24.5 (H) 4.1 - 11.1 K/uL    RBC 2.85 (L) 4.10 - 5.70 M/uL    HGB 9.2 (L) 12.1 - 17.0 g/dL    HCT 28.8 (L) 36.6 - 50.3 %    .1 (H) 80.0 - 99.0 FL    MCH 32.3 26.0 - 34.0 PG    MCHC 31.9 30.0 - 36.5 g/dL    RDW 13.1 11.5 - 14.5 %    PLATELET 119 208 - 768 K/uL    MPV 9.6 8.9 - 12.9 FL    NRBC 0.4 (H) 0  WBC    ABSOLUTE NRBC 0.10 (H) 0.00 - 0.01 K/uL    NEUTROPHILS 85 (H) 32 - 75 %    LYMPHOCYTES 10 (L) 12 - 49 %    MONOCYTES 5 5 - 13 %    EOSINOPHILS 0 0 - 7 %    BASOPHILS 0 0 - 1 %    IMMATURE GRANULOCYTES 0 0.0 - 0.5 %    ABS. NEUTROPHILS 20.8 (H) 1.8 - 8.0 K/UL    ABS. LYMPHOCYTES 2.5 0.8 - 3.5 K/UL    ABS. MONOCYTES 1.2 (H) 0.0 - 1.0 K/UL    ABS. EOSINOPHILS 0.0 0.0 - 0.4 K/UL    ABS. BASOPHILS 0.0 0.0 - 0.1 K/UL    ABS. IMM.  GRANS. 0.0 0.00 - 0.04 K/UL    DF AUTOMATED      RBC COMMENTS NORMOCYTIC, NORMOCHROMIC     METABOLIC PANEL, BASIC    Collection Time: 01/26/23  3:59 AM   Result Value Ref Range    Sodium 135 (L) 136 - 145 mmol/L    Potassium 4.5 3.5 - 5.1 mmol/L    Chloride 94 (L) 97 - 108 mmol/L    CO2 31 21 - 32 mmol/L    Anion gap 10 5 - 15 mmol/L    Glucose 120 (H) 65 - 100 mg/dL    BUN 85 (H) 6 - 20 MG/DL    Creatinine 10.20 (H) 0.70 - 1.30 MG/DL    BUN/Creatinine ratio 8 (L) 12 - 20      eGFR 5 (L) >60 ml/min/1.73m2    Calcium 9.8 8.5 - 10.1 MG/DL         Assessment/Plan:     Principal Problem:    Acute hypoxemic respiratory failure (HCC) (1/20/2023)    Active Problems:    Hypertension, renal disease (10/22/2017)      [facility] (10/22/2017)      Gastroesophageal reflux disease without esophagitis (10/22/2017)      COVID-19 (1/3/2023)      Overview: Dec 11, 2022 - Tx Paxlovid      CAP (community acquired pneumonia) (1/20/2023) ___________________________________________________  PLAN:    1. Continue Rocephin and Zithromax for pneumonia with Zithromax to be completed today  2. Continue supplemental oxygen to keep sat greater than 92%, sat good now on room air  3. I added Solu-Medrol in light of his continued shortness of breath and noting he had a recent COVID infection. I began to taper that starting yesterday and will change to p.o. today  4. ProAir inhaler as needed and supplement with DuoNeb if needed  5. Currently on Norvasc 10 mg daily and Coreg 12.5 twice daily as well as lisinopril 20 daily so we will watch blood pressure closely in light of his complaint of lightheadedness. Except 1 isolated systolic low blood pressures have been well  6. Anemia so continue Retacrit   7. ESRD dialysis per renal with dialysis yesterday  8. Follow-up on Echo: Results as follows reviewed:  Mild concentric LVH, low normal EF of 50 to 84%, grade 1 diastolic dysfunction, mild mitral regurgitation  9. Elevated white blood count secondary to steroids as I do not feel there is any ongoing sepsis currently  10. We will repeat a chest x-ray today    50 Minutes spent today in direct care of this high complexity patient with greater than 50% in counseling and coordination of care.     If need to contact me use hospital  [phone], DO NOT USE PERFECT SERVE    ___________________________________________________    Attending Physician: Dann Navarro MD AOU-L&D

## 2024-01-09 NOTE — ED ADULT TRIAGE NOTE - CHIEF COMPLAINT QUOTE
Pt c/o abdominal pain, shortness of breath, cough, and vomiting. Pt was seen here few days ago and tested RSV+. Pt is approx 25 weeks pregnant. No fevers/chills. Was given inhaler at discharge, states has been using it at home w/ minimal relief. Denies PMHx

## 2024-01-10 VITALS — SYSTOLIC BLOOD PRESSURE: 113 MMHG | DIASTOLIC BLOOD PRESSURE: 57 MMHG

## 2024-01-10 LAB
-  AMOXICILLIN/CLAVULANIC ACID: SIGNIFICANT CHANGE UP
-  AMOXICILLIN/CLAVULANIC ACID: SIGNIFICANT CHANGE UP
-  AMPICILLIN/SULBACTAM: SIGNIFICANT CHANGE UP
-  AMPICILLIN/SULBACTAM: SIGNIFICANT CHANGE UP
-  AMPICILLIN: SIGNIFICANT CHANGE UP
-  AMPICILLIN: SIGNIFICANT CHANGE UP
-  AZTREONAM: SIGNIFICANT CHANGE UP
-  AZTREONAM: SIGNIFICANT CHANGE UP
-  CEFAZOLIN: SIGNIFICANT CHANGE UP
-  CEFAZOLIN: SIGNIFICANT CHANGE UP
-  CEFEPIME: SIGNIFICANT CHANGE UP
-  CEFEPIME: SIGNIFICANT CHANGE UP
-  CEFOXITIN: SIGNIFICANT CHANGE UP
-  CEFOXITIN: SIGNIFICANT CHANGE UP
-  CEFTRIAXONE: SIGNIFICANT CHANGE UP
-  CEFTRIAXONE: SIGNIFICANT CHANGE UP
-  CEFUROXIME: SIGNIFICANT CHANGE UP
-  CEFUROXIME: SIGNIFICANT CHANGE UP
-  CIPROFLOXACIN: SIGNIFICANT CHANGE UP
-  CIPROFLOXACIN: SIGNIFICANT CHANGE UP
-  ERTAPENEM: SIGNIFICANT CHANGE UP
-  ERTAPENEM: SIGNIFICANT CHANGE UP
-  GENTAMICIN: SIGNIFICANT CHANGE UP
-  GENTAMICIN: SIGNIFICANT CHANGE UP
-  IMIPENEM: SIGNIFICANT CHANGE UP
-  IMIPENEM: SIGNIFICANT CHANGE UP
-  LEVOFLOXACIN: SIGNIFICANT CHANGE UP
-  LEVOFLOXACIN: SIGNIFICANT CHANGE UP
-  MEROPENEM: SIGNIFICANT CHANGE UP
-  MEROPENEM: SIGNIFICANT CHANGE UP
-  NITROFURANTOIN: SIGNIFICANT CHANGE UP
-  NITROFURANTOIN: SIGNIFICANT CHANGE UP
-  PIPERACILLIN/TAZOBACTAM: SIGNIFICANT CHANGE UP
-  PIPERACILLIN/TAZOBACTAM: SIGNIFICANT CHANGE UP
-  TOBRAMYCIN: SIGNIFICANT CHANGE UP
-  TOBRAMYCIN: SIGNIFICANT CHANGE UP
-  TRIMETHOPRIM/SULFAMETHOXAZOLE: SIGNIFICANT CHANGE UP
-  TRIMETHOPRIM/SULFAMETHOXAZOLE: SIGNIFICANT CHANGE UP
ALBUMIN SERPL ELPH-MCNC: 3.5 G/DL — SIGNIFICANT CHANGE UP (ref 3.3–5)
ALBUMIN SERPL ELPH-MCNC: 3.5 G/DL — SIGNIFICANT CHANGE UP (ref 3.3–5)
ALP SERPL-CCNC: 99 U/L — SIGNIFICANT CHANGE UP (ref 40–120)
ALP SERPL-CCNC: 99 U/L — SIGNIFICANT CHANGE UP (ref 40–120)
ALT FLD-CCNC: 8 U/L — SIGNIFICANT CHANGE UP (ref 4–33)
ALT FLD-CCNC: 8 U/L — SIGNIFICANT CHANGE UP (ref 4–33)
AMYLASE P1 CFR SERPL: 60 U/L — SIGNIFICANT CHANGE UP (ref 25–125)
AMYLASE P1 CFR SERPL: 60 U/L — SIGNIFICANT CHANGE UP (ref 25–125)
ANION GAP SERPL CALC-SCNC: 12 MMOL/L — SIGNIFICANT CHANGE UP (ref 7–14)
ANION GAP SERPL CALC-SCNC: 12 MMOL/L — SIGNIFICANT CHANGE UP (ref 7–14)
APPEARANCE UR: CLEAR — SIGNIFICANT CHANGE UP
APPEARANCE UR: CLEAR — SIGNIFICANT CHANGE UP
AST SERPL-CCNC: 8 U/L — SIGNIFICANT CHANGE UP (ref 4–32)
AST SERPL-CCNC: 8 U/L — SIGNIFICANT CHANGE UP (ref 4–32)
BACTERIA # UR AUTO: NEGATIVE /HPF — SIGNIFICANT CHANGE UP
BACTERIA # UR AUTO: NEGATIVE /HPF — SIGNIFICANT CHANGE UP
BASOPHILS # BLD AUTO: 0.04 K/UL — SIGNIFICANT CHANGE UP (ref 0–0.2)
BASOPHILS # BLD AUTO: 0.04 K/UL — SIGNIFICANT CHANGE UP (ref 0–0.2)
BASOPHILS NFR BLD AUTO: 0.3 % — SIGNIFICANT CHANGE UP (ref 0–2)
BASOPHILS NFR BLD AUTO: 0.3 % — SIGNIFICANT CHANGE UP (ref 0–2)
BILIRUB SERPL-MCNC: <0.2 MG/DL — SIGNIFICANT CHANGE UP (ref 0.2–1.2)
BILIRUB SERPL-MCNC: <0.2 MG/DL — SIGNIFICANT CHANGE UP (ref 0.2–1.2)
BILIRUB UR-MCNC: NEGATIVE — SIGNIFICANT CHANGE UP
BILIRUB UR-MCNC: NEGATIVE — SIGNIFICANT CHANGE UP
BUN SERPL-MCNC: 6 MG/DL — LOW (ref 7–23)
BUN SERPL-MCNC: 6 MG/DL — LOW (ref 7–23)
CALCIUM SERPL-MCNC: 8.9 MG/DL — SIGNIFICANT CHANGE UP (ref 8.4–10.5)
CALCIUM SERPL-MCNC: 8.9 MG/DL — SIGNIFICANT CHANGE UP (ref 8.4–10.5)
CAST: 0 /LPF — SIGNIFICANT CHANGE UP (ref 0–4)
CAST: 0 /LPF — SIGNIFICANT CHANGE UP (ref 0–4)
CHLORIDE SERPL-SCNC: 104 MMOL/L — SIGNIFICANT CHANGE UP (ref 98–107)
CHLORIDE SERPL-SCNC: 104 MMOL/L — SIGNIFICANT CHANGE UP (ref 98–107)
CO2 SERPL-SCNC: 20 MMOL/L — LOW (ref 22–31)
CO2 SERPL-SCNC: 20 MMOL/L — LOW (ref 22–31)
COLOR SPEC: YELLOW — SIGNIFICANT CHANGE UP
COLOR SPEC: YELLOW — SIGNIFICANT CHANGE UP
CREAT SERPL-MCNC: 0.37 MG/DL — LOW (ref 0.5–1.3)
CREAT SERPL-MCNC: 0.37 MG/DL — LOW (ref 0.5–1.3)
CULTURE RESULTS: ABNORMAL
CULTURE RESULTS: ABNORMAL
DIFF PNL FLD: ABNORMAL
DIFF PNL FLD: ABNORMAL
EGFR: 132 ML/MIN/1.73M2 — SIGNIFICANT CHANGE UP
EGFR: 132 ML/MIN/1.73M2 — SIGNIFICANT CHANGE UP
EOSINOPHIL # BLD AUTO: 0.47 K/UL — SIGNIFICANT CHANGE UP (ref 0–0.5)
EOSINOPHIL # BLD AUTO: 0.47 K/UL — SIGNIFICANT CHANGE UP (ref 0–0.5)
EOSINOPHIL NFR BLD AUTO: 4.1 % — SIGNIFICANT CHANGE UP (ref 0–6)
EOSINOPHIL NFR BLD AUTO: 4.1 % — SIGNIFICANT CHANGE UP (ref 0–6)
GLUCOSE SERPL-MCNC: 109 MG/DL — HIGH (ref 70–99)
GLUCOSE SERPL-MCNC: 109 MG/DL — HIGH (ref 70–99)
GLUCOSE UR QL: NEGATIVE MG/DL — SIGNIFICANT CHANGE UP
GLUCOSE UR QL: NEGATIVE MG/DL — SIGNIFICANT CHANGE UP
HCT VFR BLD CALC: 33.6 % — LOW (ref 34.5–45)
HCT VFR BLD CALC: 33.6 % — LOW (ref 34.5–45)
HGB BLD-MCNC: 10.7 G/DL — LOW (ref 11.5–15.5)
HGB BLD-MCNC: 10.7 G/DL — LOW (ref 11.5–15.5)
IANC: 7.67 K/UL — HIGH (ref 1.8–7.4)
IANC: 7.67 K/UL — HIGH (ref 1.8–7.4)
IMM GRANULOCYTES NFR BLD AUTO: 2.4 % — HIGH (ref 0–0.9)
IMM GRANULOCYTES NFR BLD AUTO: 2.4 % — HIGH (ref 0–0.9)
KETONES UR-MCNC: NEGATIVE MG/DL — SIGNIFICANT CHANGE UP
KETONES UR-MCNC: NEGATIVE MG/DL — SIGNIFICANT CHANGE UP
LEUKOCYTE ESTERASE UR-ACNC: NEGATIVE — SIGNIFICANT CHANGE UP
LEUKOCYTE ESTERASE UR-ACNC: NEGATIVE — SIGNIFICANT CHANGE UP
LIDOCAIN IGE QN: 26 U/L — SIGNIFICANT CHANGE UP (ref 7–60)
LIDOCAIN IGE QN: 26 U/L — SIGNIFICANT CHANGE UP (ref 7–60)
LYMPHOCYTES # BLD AUTO: 19 % — SIGNIFICANT CHANGE UP (ref 13–44)
LYMPHOCYTES # BLD AUTO: 19 % — SIGNIFICANT CHANGE UP (ref 13–44)
LYMPHOCYTES # BLD AUTO: 2.18 K/UL — SIGNIFICANT CHANGE UP (ref 1–3.3)
LYMPHOCYTES # BLD AUTO: 2.18 K/UL — SIGNIFICANT CHANGE UP (ref 1–3.3)
MCHC RBC-ENTMCNC: 27.2 PG — SIGNIFICANT CHANGE UP (ref 27–34)
MCHC RBC-ENTMCNC: 27.2 PG — SIGNIFICANT CHANGE UP (ref 27–34)
MCHC RBC-ENTMCNC: 31.8 GM/DL — LOW (ref 32–36)
MCHC RBC-ENTMCNC: 31.8 GM/DL — LOW (ref 32–36)
MCV RBC AUTO: 85.3 FL — SIGNIFICANT CHANGE UP (ref 80–100)
MCV RBC AUTO: 85.3 FL — SIGNIFICANT CHANGE UP (ref 80–100)
METHOD TYPE: SIGNIFICANT CHANGE UP
METHOD TYPE: SIGNIFICANT CHANGE UP
MONOCYTES # BLD AUTO: 0.85 K/UL — SIGNIFICANT CHANGE UP (ref 0–0.9)
MONOCYTES # BLD AUTO: 0.85 K/UL — SIGNIFICANT CHANGE UP (ref 0–0.9)
MONOCYTES NFR BLD AUTO: 7.4 % — SIGNIFICANT CHANGE UP (ref 2–14)
MONOCYTES NFR BLD AUTO: 7.4 % — SIGNIFICANT CHANGE UP (ref 2–14)
NEUTROPHILS # BLD AUTO: 7.67 K/UL — HIGH (ref 1.8–7.4)
NEUTROPHILS # BLD AUTO: 7.67 K/UL — HIGH (ref 1.8–7.4)
NEUTROPHILS NFR BLD AUTO: 66.8 % — SIGNIFICANT CHANGE UP (ref 43–77)
NEUTROPHILS NFR BLD AUTO: 66.8 % — SIGNIFICANT CHANGE UP (ref 43–77)
NITRITE UR-MCNC: NEGATIVE — SIGNIFICANT CHANGE UP
NITRITE UR-MCNC: NEGATIVE — SIGNIFICANT CHANGE UP
NRBC # BLD: 0 /100 WBCS — SIGNIFICANT CHANGE UP (ref 0–0)
NRBC # BLD: 0 /100 WBCS — SIGNIFICANT CHANGE UP (ref 0–0)
NRBC # FLD: 0 K/UL — SIGNIFICANT CHANGE UP (ref 0–0)
NRBC # FLD: 0 K/UL — SIGNIFICANT CHANGE UP (ref 0–0)
ORGANISM # SPEC MICROSCOPIC CNT: ABNORMAL
PH UR: 6.5 — SIGNIFICANT CHANGE UP (ref 5–8)
PH UR: 6.5 — SIGNIFICANT CHANGE UP (ref 5–8)
PLATELET # BLD AUTO: 305 K/UL — SIGNIFICANT CHANGE UP (ref 150–400)
PLATELET # BLD AUTO: 305 K/UL — SIGNIFICANT CHANGE UP (ref 150–400)
POTASSIUM SERPL-MCNC: 3.7 MMOL/L — SIGNIFICANT CHANGE UP (ref 3.5–5.3)
POTASSIUM SERPL-MCNC: 3.7 MMOL/L — SIGNIFICANT CHANGE UP (ref 3.5–5.3)
POTASSIUM SERPL-SCNC: 3.7 MMOL/L — SIGNIFICANT CHANGE UP (ref 3.5–5.3)
POTASSIUM SERPL-SCNC: 3.7 MMOL/L — SIGNIFICANT CHANGE UP (ref 3.5–5.3)
PROT SERPL-MCNC: 6.6 G/DL — SIGNIFICANT CHANGE UP (ref 6–8.3)
PROT SERPL-MCNC: 6.6 G/DL — SIGNIFICANT CHANGE UP (ref 6–8.3)
PROT UR-MCNC: NEGATIVE MG/DL — SIGNIFICANT CHANGE UP
PROT UR-MCNC: NEGATIVE MG/DL — SIGNIFICANT CHANGE UP
RBC # BLD: 3.94 M/UL — SIGNIFICANT CHANGE UP (ref 3.8–5.2)
RBC # BLD: 3.94 M/UL — SIGNIFICANT CHANGE UP (ref 3.8–5.2)
RBC # FLD: 14.6 % — HIGH (ref 10.3–14.5)
RBC # FLD: 14.6 % — HIGH (ref 10.3–14.5)
RBC CASTS # UR COMP ASSIST: 3 /HPF — SIGNIFICANT CHANGE UP (ref 0–4)
RBC CASTS # UR COMP ASSIST: 3 /HPF — SIGNIFICANT CHANGE UP (ref 0–4)
SODIUM SERPL-SCNC: 136 MMOL/L — SIGNIFICANT CHANGE UP (ref 135–145)
SODIUM SERPL-SCNC: 136 MMOL/L — SIGNIFICANT CHANGE UP (ref 135–145)
SP GR SPEC: 1.02 — SIGNIFICANT CHANGE UP (ref 1–1.03)
SP GR SPEC: 1.02 — SIGNIFICANT CHANGE UP (ref 1–1.03)
SPECIMEN SOURCE: SIGNIFICANT CHANGE UP
SPECIMEN SOURCE: SIGNIFICANT CHANGE UP
SQUAMOUS # UR AUTO: 1 /HPF — SIGNIFICANT CHANGE UP (ref 0–5)
SQUAMOUS # UR AUTO: 1 /HPF — SIGNIFICANT CHANGE UP (ref 0–5)
UROBILINOGEN FLD QL: 0.2 MG/DL — SIGNIFICANT CHANGE UP (ref 0.2–1)
UROBILINOGEN FLD QL: 0.2 MG/DL — SIGNIFICANT CHANGE UP (ref 0.2–1)
WBC # BLD: 11.48 K/UL — HIGH (ref 3.8–10.5)
WBC # BLD: 11.48 K/UL — HIGH (ref 3.8–10.5)
WBC # FLD AUTO: 11.48 K/UL — HIGH (ref 3.8–10.5)
WBC # FLD AUTO: 11.48 K/UL — HIGH (ref 3.8–10.5)
WBC UR QL: 2 /HPF — SIGNIFICANT CHANGE UP (ref 0–5)
WBC UR QL: 2 /HPF — SIGNIFICANT CHANGE UP (ref 0–5)

## 2024-01-10 RX ORDER — ACETAMINOPHEN 500 MG
1000 TABLET ORAL ONCE
Refills: 0 | Status: COMPLETED | OUTPATIENT
Start: 2024-01-10 | End: 2024-01-10

## 2024-01-10 RX ORDER — ONDANSETRON 8 MG/1
4 TABLET, FILM COATED ORAL ONCE
Refills: 0 | Status: COMPLETED | OUTPATIENT
Start: 2024-01-10 | End: 2024-01-10

## 2024-01-10 RX ORDER — SODIUM CHLORIDE 9 MG/ML
1000 INJECTION, SOLUTION INTRAVENOUS ONCE
Refills: 0 | Status: COMPLETED | OUTPATIENT
Start: 2024-01-10 | End: 2024-01-10

## 2024-01-10 RX ADMIN — Medication 200 MILLIGRAM(S): at 02:02

## 2024-01-10 RX ADMIN — Medication 400 MILLIGRAM(S): at 01:38

## 2024-01-10 RX ADMIN — SODIUM CHLORIDE 2000 MILLILITER(S): 9 INJECTION, SOLUTION INTRAVENOUS at 01:04

## 2024-01-10 RX ADMIN — ONDANSETRON 4 MILLIGRAM(S): 8 TABLET, FILM COATED ORAL at 01:38

## 2024-01-10 RX ADMIN — Medication 1000 MILLIGRAM(S): at 02:38

## 2024-01-10 NOTE — OB PROVIDER TRIAGE NOTE - HISTORY OF PRESENT ILLNESS
39 yo , EGA@25 3/ weeks, presented to D&T with c/o Ribs pain with coughing, SOB at times, positive RSV on 24,pt was seen in the ED, dizziness and vomiting when coughing, denies contractions, vaginal bleeding, leakage of fluid, and reports fetal movement.  Denies fever, chills, chest pain.   Pt is currently getting treated with Cefpodoxime for UTI  Prenatal care with PCAP clinic   Prenatal course is uncomplicated as per patient  Meds: PNV, asa 81mg po daily  Allergies: NKDA     37 yo , EGA@25 3/ weeks, presented to D&T with c/o Ribs pain with coughing, SOB at times, positive RSV on 24,pt was seen in the ED, dizziness and vomiting when coughing, denies contractions, vaginal bleeding, leakage of fluid, and reports fetal movement.  Denies fever, chills, chest pain.   Pt is currently getting treated with Cefpodoxime for UTI  Prenatal care with PCAP clinic   Prenatal course is uncomplicated as per patient  Meds: PNV, asa 81mg po daily  Allergies: NKDA

## 2024-01-10 NOTE — OB PROVIDER TRIAGE NOTE - NSOBPROVIDERNOTE_OBGYN_ALL_OB_FT
39 yo , EGA@25 3/7 weeks,   CBC, Chem, UA, amylase, lipase  tylenols, Robitussin, Zofran 37 yo , EGA@25 3/7 weeks,   CBC, Chem, UA, amylase, lipase  tylenols, Robitussin, Zofran 39 yo , EGA@25 3/7 weeks,   CBC, Chem, UA, amylase, lipase  tylenols, Robitussin, Zofran  Pt report that she is feeling so much better   0230 discuss with Dr. Wolf PGY-3  pt to keep self very well hydrated  stable for discharge   Labor precautions and fetal movements count were reviewed; if not in labor, will follow up with OB for the next schedule appointment.  All ordered tests results reviewed and interpreted.  Plan of care was reviewed with patient and family; patient states understanding of the above plan.  Pt Discharged home @ 0240A

## 2024-01-10 NOTE — OB PROVIDER TRIAGE NOTE - NSHPLABSRESULTS_GEN_ALL_CORE
CBC Full  -  ( 10 Jonathan 2024 00:56 )  WBC Count : 11.48 K/uL  RBC Count : 3.94 M/uL  Hemoglobin : 10.7 g/dL  Hematocrit : 33.6 %  Platelet Count - Automated : 305 K/uL  Mean Cell Volume : 85.3 fL  Mean Cell Hemoglobin : 27.2 pg  Mean Cell Hemoglobin Concentration : 31.8 gm/dL  Auto Neutrophil # : 7.67 K/uL  Auto Lymphocyte # : 2.18 K/uL  Auto Monocyte # : 0.85 K/uL  Auto Eosinophil # : 0.47 K/uL  Auto Basophil # : 0.04 K/uL  Auto Neutrophil % : 66.8 %  Auto Lymphocyte % : 19.0 %  Auto Monocyte % : 7.4 %  Auto Eosinophil % : 4.1 %  Auto Basophil % : 0.3 %  01-10    136  |  104  |  6<L>  ----------------------------<  109<H>  3.7   |  20<L>  |  0.37<L>    Ca    8.9      10 Jonathan 2024 00:56    TPro  6.6  /  Alb  3.5  /  TBili  <0.2  /  DBili  x   /  AST  8   /  ALT  8   /  AlkPhos  99  01-10  Urinalysis Basic - ( 10 Jonathan 2024 01:10 )    Color: Yellow / Appearance: Clear / S.019 / pH: x  Gluc: x / Ketone: Negative mg/dL  / Bili: Negative / Urobili: 0.2 mg/dL   Blood: x / Protein: Negative mg/dL / Nitrite: Negative   Leuk Esterase: Negative / RBC: 3 /HPF / WBC 2 /HPF   Sq Epi: x / Non Sq Epi: 1 /HPF / Bacteria: Negative /HPF

## 2024-01-10 NOTE — OB PROVIDER TRIAGE NOTE - NSHPPHYSICALEXAM_GEN_ALL_CORE
Vital Signs Last 24 Hrs  T(C): 37.1 (09 Jan 2024 23:36), Max: 37.1 (09 Jan 2024 23:36)  T(F): 98.8 (09 Jan 2024 23:36), Max: 98.8 (09 Jan 2024 23:36)  HR: 82 (10 Jonathan 2024 01:48) (82 - 111)  BP: 102/57 (10 Jonathan 2024 01:48) (83/47 - 141/84)  BP(mean): --  RR: 18 (09 Jan 2024 23:36) (18 - 20)  SpO2: 99% (10 Jonathan 2024 01:45) (97% - 100%)      Gen: NAD  Head: NC/AT  Cardio: S1S2+, RRR  Resp: CTABL, no wheezing  Abdomen: Soft, NT/ND, BS+  Extremities: No LE edema bilaterally    NST and BPP done to assess fetal surveillance and results as follows:  NST-->FHR: 130HR baseline, moderate variability, accelerations present, no decelerations, reactive NST.  [place]: no Contractions present  saved in ASOB- TAUS- cephalic presentation, anterior placenta, M-mode 140bpm, MVP 7.0- good fetal movement. Vital Signs Last 24 Hrs  T(C): 37.1 (09 Jan 2024 23:36), Max: 37.1 (09 Jan 2024 23:36)  T(F): 98.8 (09 Jan 2024 23:36), Max: 98.8 (09 Jan 2024 23:36)  HR: 82 (10 Jonathan 2024 01:48) (82 - 111)  BP: 102/57 (10 Jonathan 2024 01:48) (83/47 - 141/84)  BP(mean): --  RR: 18 (09 Jan 2024 23:36) (18 - 20)  SpO2: 99% (10 Jonathan 2024 01:45) (97% - 100%)      Gen: NAD  Head: NC/AT  Cardio: S1S2+, RRR  Resp: CTABL, no wheezing  Abdomen: Soft, NT/ND, BS+  Extremities: No LE edema bilaterally    NST and BPP done to assess fetal surveillance and results as follows:  NST-->FHR: 130HR baseline, moderate variability, accelerations present, no decelerations, reactive NST.  [place]: no Contractions present  saved in ASOB- TAUS- cephalic presentation, anterior placenta, M-mode 140bpm, MVP 7.0- good fetal movement.

## 2024-01-11 DIAGNOSIS — O09.522 SUPERVISION OF ELDERLY MULTIGRAVIDA, SECOND TRIMESTER: ICD-10-CM

## 2024-01-11 DIAGNOSIS — O99.512 DISEASES OF THE RESPIRATORY SYSTEM COMPLICATING PREGNANCY, SECOND TRIMESTER: ICD-10-CM

## 2024-01-11 DIAGNOSIS — Z3A.25 25 WEEKS GESTATION OF PREGNANCY: ICD-10-CM

## 2024-01-11 DIAGNOSIS — R07.81 PLEURODYNIA: ICD-10-CM

## 2024-01-11 DIAGNOSIS — R06.02 SHORTNESS OF BREATH: ICD-10-CM

## 2024-01-11 DIAGNOSIS — O99.891 OTHER SPECIFIED DISEASES AND CONDITIONS COMPLICATING PREGNANCY: ICD-10-CM

## 2024-01-11 DIAGNOSIS — O09.292 SUPERVISION OF PREGNANCY WITH OTHER POOR REPRODUCTIVE OR OBSTETRIC HISTORY, SECOND TRIMESTER: ICD-10-CM

## 2024-01-11 DIAGNOSIS — Z87.442 PERSONAL HISTORY OF URINARY CALCULI: ICD-10-CM

## 2024-01-11 DIAGNOSIS — J45.909 UNSPECIFIED ASTHMA, UNCOMPLICATED: ICD-10-CM

## 2024-01-11 DIAGNOSIS — O26.892 OTHER SPECIFIED PREGNANCY RELATED CONDITIONS, SECOND TRIMESTER: ICD-10-CM

## 2024-01-11 DIAGNOSIS — R05.9 COUGH, UNSPECIFIED: ICD-10-CM

## 2024-01-12 ENCOUNTER — OUTPATIENT (OUTPATIENT)
Dept: OUTPATIENT SERVICES | Facility: HOSPITAL | Age: 39
LOS: 1 days | End: 2024-01-12

## 2024-01-12 ENCOUNTER — APPOINTMENT (OUTPATIENT)
Dept: OBGYN | Facility: HOSPITAL | Age: 39
End: 2024-01-12

## 2024-01-12 ENCOUNTER — RESULT REVIEW (OUTPATIENT)
Age: 39
End: 2024-01-12

## 2024-01-12 VITALS
SYSTOLIC BLOOD PRESSURE: 132 MMHG | HEIGHT: 62 IN | TEMPERATURE: 97.7 F | HEART RATE: 120 BPM | DIASTOLIC BLOOD PRESSURE: 73 MMHG | BODY MASS INDEX: 42.33 KG/M2 | WEIGHT: 230 LBS

## 2024-01-12 VITALS
TEMPERATURE: 97.7 F | HEART RATE: 112 BPM | BODY MASS INDEX: 42.33 KG/M2 | WEIGHT: 230 LBS | SYSTOLIC BLOOD PRESSURE: 122 MMHG | HEIGHT: 62 IN | DIASTOLIC BLOOD PRESSURE: 68 MMHG

## 2024-01-12 DIAGNOSIS — O09.292 SUPERVISION OF PREGNANCY WITH OTHER POOR REPRODUCTIVE OR OBSTETRIC HISTORY, SECOND TRIMESTER: ICD-10-CM

## 2024-01-12 DIAGNOSIS — Z34.82 ENCOUNTER FOR SUPERVISION OF OTHER NORMAL PREGNANCY, SECOND TRIMESTER: ICD-10-CM

## 2024-01-12 DIAGNOSIS — Z98.890 OTHER SPECIFIED POSTPROCEDURAL STATES: Chronic | ICD-10-CM

## 2024-01-12 LAB
24R-OH-CALCIDIOL SERPL-MCNC: 15.6 NG/ML — LOW (ref 30–80)
24R-OH-CALCIDIOL SERPL-MCNC: 15.6 NG/ML — LOW (ref 30–80)
BASOPHILS # BLD AUTO: 0.03 K/UL — SIGNIFICANT CHANGE UP (ref 0–0.2)
BASOPHILS # BLD AUTO: 0.03 K/UL — SIGNIFICANT CHANGE UP (ref 0–0.2)
BASOPHILS NFR BLD AUTO: 0.3 % — SIGNIFICANT CHANGE UP (ref 0–2)
BASOPHILS NFR BLD AUTO: 0.3 % — SIGNIFICANT CHANGE UP (ref 0–2)
EOSINOPHIL # BLD AUTO: 0.17 K/UL — SIGNIFICANT CHANGE UP (ref 0–0.5)
EOSINOPHIL # BLD AUTO: 0.17 K/UL — SIGNIFICANT CHANGE UP (ref 0–0.5)
EOSINOPHIL NFR BLD AUTO: 1.4 % — SIGNIFICANT CHANGE UP (ref 0–6)
EOSINOPHIL NFR BLD AUTO: 1.4 % — SIGNIFICANT CHANGE UP (ref 0–6)
GLUCOSE 1H P MEAL SERPL-MCNC: 146 MG/DL — HIGH (ref 70–134)
GLUCOSE 1H P MEAL SERPL-MCNC: 146 MG/DL — HIGH (ref 70–134)
HCT VFR BLD CALC: 33.4 % — LOW (ref 34.5–45)
HCT VFR BLD CALC: 33.4 % — LOW (ref 34.5–45)
HGB BLD-MCNC: 11.1 G/DL — LOW (ref 11.5–15.5)
HGB BLD-MCNC: 11.1 G/DL — LOW (ref 11.5–15.5)
IANC: 9.25 K/UL — HIGH (ref 1.8–7.4)
IANC: 9.25 K/UL — HIGH (ref 1.8–7.4)
IMM GRANULOCYTES NFR BLD AUTO: 3 % — HIGH (ref 0–0.9)
IMM GRANULOCYTES NFR BLD AUTO: 3 % — HIGH (ref 0–0.9)
LYMPHOCYTES # BLD AUTO: 1.58 K/UL — SIGNIFICANT CHANGE UP (ref 1–3.3)
LYMPHOCYTES # BLD AUTO: 1.58 K/UL — SIGNIFICANT CHANGE UP (ref 1–3.3)
LYMPHOCYTES # BLD AUTO: 13.3 % — SIGNIFICANT CHANGE UP (ref 13–44)
LYMPHOCYTES # BLD AUTO: 13.3 % — SIGNIFICANT CHANGE UP (ref 13–44)
MCHC RBC-ENTMCNC: 28 PG — SIGNIFICANT CHANGE UP (ref 27–34)
MCHC RBC-ENTMCNC: 28 PG — SIGNIFICANT CHANGE UP (ref 27–34)
MCHC RBC-ENTMCNC: 33.2 GM/DL — SIGNIFICANT CHANGE UP (ref 32–36)
MCHC RBC-ENTMCNC: 33.2 GM/DL — SIGNIFICANT CHANGE UP (ref 32–36)
MCV RBC AUTO: 84.3 FL — SIGNIFICANT CHANGE UP (ref 80–100)
MCV RBC AUTO: 84.3 FL — SIGNIFICANT CHANGE UP (ref 80–100)
MONOCYTES # BLD AUTO: 0.53 K/UL — SIGNIFICANT CHANGE UP (ref 0–0.9)
MONOCYTES # BLD AUTO: 0.53 K/UL — SIGNIFICANT CHANGE UP (ref 0–0.9)
MONOCYTES NFR BLD AUTO: 4.4 % — SIGNIFICANT CHANGE UP (ref 2–14)
MONOCYTES NFR BLD AUTO: 4.4 % — SIGNIFICANT CHANGE UP (ref 2–14)
NEUTROPHILS # BLD AUTO: 9.25 K/UL — HIGH (ref 1.8–7.4)
NEUTROPHILS # BLD AUTO: 9.25 K/UL — HIGH (ref 1.8–7.4)
NEUTROPHILS NFR BLD AUTO: 77.6 % — HIGH (ref 43–77)
NEUTROPHILS NFR BLD AUTO: 77.6 % — HIGH (ref 43–77)
NRBC # BLD: 0 /100 WBCS — SIGNIFICANT CHANGE UP (ref 0–0)
NRBC # BLD: 0 /100 WBCS — SIGNIFICANT CHANGE UP (ref 0–0)
NRBC # FLD: 0 K/UL — SIGNIFICANT CHANGE UP (ref 0–0)
NRBC # FLD: 0 K/UL — SIGNIFICANT CHANGE UP (ref 0–0)
PLATELET # BLD AUTO: 324 K/UL — SIGNIFICANT CHANGE UP (ref 150–400)
PLATELET # BLD AUTO: 324 K/UL — SIGNIFICANT CHANGE UP (ref 150–400)
RBC # BLD: 3.96 M/UL — SIGNIFICANT CHANGE UP (ref 3.8–5.2)
RBC # BLD: 3.96 M/UL — SIGNIFICANT CHANGE UP (ref 3.8–5.2)
RBC # FLD: 14.3 % — SIGNIFICANT CHANGE UP (ref 10.3–14.5)
RBC # FLD: 14.3 % — SIGNIFICANT CHANGE UP (ref 10.3–14.5)
WBC # BLD: 11.92 K/UL — HIGH (ref 3.8–10.5)
WBC # BLD: 11.92 K/UL — HIGH (ref 3.8–10.5)
WBC # FLD AUTO: 11.92 K/UL — HIGH (ref 3.8–10.5)
WBC # FLD AUTO: 11.92 K/UL — HIGH (ref 3.8–10.5)

## 2024-01-13 LAB
CULTURE RESULTS: SIGNIFICANT CHANGE UP
CULTURE RESULTS: SIGNIFICANT CHANGE UP
SPECIMEN SOURCE: SIGNIFICANT CHANGE UP
SPECIMEN SOURCE: SIGNIFICANT CHANGE UP
T PALLIDUM AB TITR SER: NEGATIVE — SIGNIFICANT CHANGE UP
T PALLIDUM AB TITR SER: NEGATIVE — SIGNIFICANT CHANGE UP

## 2024-01-17 ENCOUNTER — APPOINTMENT (OUTPATIENT)
Dept: OBGYN | Facility: HOSPITAL | Age: 39
End: 2024-01-17
Payer: MEDICAID

## 2024-01-17 ENCOUNTER — OUTPATIENT (OUTPATIENT)
Dept: OUTPATIENT SERVICES | Facility: HOSPITAL | Age: 39
LOS: 1 days | End: 2024-01-17

## 2024-01-17 ENCOUNTER — RESULT REVIEW (OUTPATIENT)
Age: 39
End: 2024-01-17

## 2024-01-17 DIAGNOSIS — Z34.83 ENCOUNTER FOR SUPERVISION OF OTHER NORMAL PREGNANCY, THIRD TRIMESTER: ICD-10-CM

## 2024-01-17 DIAGNOSIS — Z34.92 ENCOUNTER FOR SUPERVISION OF NORMAL PREGNANCY, UNSPECIFIED, SECOND TRIMESTER: ICD-10-CM

## 2024-01-17 DIAGNOSIS — Z98.890 OTHER SPECIFIED POSTPROCEDURAL STATES: Chronic | ICD-10-CM

## 2024-01-17 LAB
GESTATIONAL GTT PNL UR+SERPL: 88 MG/DL — SIGNIFICANT CHANGE UP (ref 70–94)
GLUCOSE 1H P CHAL SERPL-MCNC: 155 MG/DL — SIGNIFICANT CHANGE UP (ref 70–179)
GTT GEST 2H PNL UR+SERPL: 169 MG/DL — HIGH (ref 70–154)
GTT GEST 3H PNL SERPL: 136 MG/DL — SIGNIFICANT CHANGE UP (ref 70–139)

## 2024-01-17 PROCEDURE — 99211 OFF/OP EST MAY X REQ PHY/QHP: CPT | Mod: TH,25

## 2024-01-31 ENCOUNTER — NON-APPOINTMENT (OUTPATIENT)
Age: 39
End: 2024-01-31

## 2024-02-01 ENCOUNTER — MED ADMIN CHARGE (OUTPATIENT)
Age: 39
End: 2024-02-01

## 2024-02-01 ENCOUNTER — NON-APPOINTMENT (OUTPATIENT)
Age: 39
End: 2024-02-01

## 2024-02-01 ENCOUNTER — APPOINTMENT (OUTPATIENT)
Dept: MATERNAL FETAL MEDICINE | Facility: HOSPITAL | Age: 39
End: 2024-02-01
Payer: COMMERCIAL

## 2024-02-01 ENCOUNTER — APPOINTMENT (OUTPATIENT)
Dept: OBGYN | Facility: HOSPITAL | Age: 39
End: 2024-02-01
Payer: COMMERCIAL

## 2024-02-01 ENCOUNTER — ASOB RESULT (OUTPATIENT)
Age: 39
End: 2024-02-01

## 2024-02-01 ENCOUNTER — OUTPATIENT (OUTPATIENT)
Dept: OUTPATIENT SERVICES | Facility: HOSPITAL | Age: 39
LOS: 1 days | End: 2024-02-01

## 2024-02-01 VITALS
DIASTOLIC BLOOD PRESSURE: 65 MMHG | SYSTOLIC BLOOD PRESSURE: 122 MMHG | HEART RATE: 112 BPM | HEIGHT: 62 IN | WEIGHT: 231 LBS | TEMPERATURE: 98.1 F | BODY MASS INDEX: 42.51 KG/M2

## 2024-02-01 DIAGNOSIS — E55.9 VITAMIN D DEFICIENCY, UNSPECIFIED: ICD-10-CM

## 2024-02-01 DIAGNOSIS — Z23 ENCOUNTER FOR IMMUNIZATION: ICD-10-CM

## 2024-02-01 DIAGNOSIS — N39.0 URINARY TRACT INFECTION, SITE NOT SPECIFIED: ICD-10-CM

## 2024-02-01 DIAGNOSIS — Z34.83 ENCOUNTER FOR SUPERVISION OF OTHER NORMAL PREGNANCY, THIRD TRIMESTER: ICD-10-CM

## 2024-02-01 DIAGNOSIS — Z98.890 OTHER SPECIFIED POSTPROCEDURAL STATES: Chronic | ICD-10-CM

## 2024-02-01 PROCEDURE — 76816 OB US FOLLOW-UP PER FETUS: CPT | Mod: 26

## 2024-02-01 PROCEDURE — 76819 FETAL BIOPHYS PROFIL W/O NST: CPT | Mod: 26,59

## 2024-02-14 ENCOUNTER — NON-APPOINTMENT (OUTPATIENT)
Age: 39
End: 2024-02-14

## 2024-02-15 ENCOUNTER — OUTPATIENT (OUTPATIENT)
Dept: OUTPATIENT SERVICES | Facility: HOSPITAL | Age: 39
LOS: 1 days | End: 2024-02-15

## 2024-02-15 ENCOUNTER — APPOINTMENT (OUTPATIENT)
Dept: OBGYN | Facility: HOSPITAL | Age: 39
End: 2024-02-15

## 2024-02-15 VITALS
HEIGHT: 62 IN | WEIGHT: 232 LBS | BODY MASS INDEX: 42.69 KG/M2 | SYSTOLIC BLOOD PRESSURE: 117 MMHG | HEART RATE: 99 BPM | TEMPERATURE: 98 F | DIASTOLIC BLOOD PRESSURE: 71 MMHG

## 2024-02-16 DIAGNOSIS — Z34.83 ENCOUNTER FOR SUPERVISION OF OTHER NORMAL PREGNANCY, THIRD TRIMESTER: ICD-10-CM

## 2024-02-28 ENCOUNTER — NON-APPOINTMENT (OUTPATIENT)
Age: 39
End: 2024-02-28

## 2024-02-29 ENCOUNTER — APPOINTMENT (OUTPATIENT)
Dept: OBGYN | Facility: HOSPITAL | Age: 39
End: 2024-02-29

## 2024-02-29 ENCOUNTER — NON-APPOINTMENT (OUTPATIENT)
Age: 39
End: 2024-02-29

## 2024-02-29 ENCOUNTER — ASOB RESULT (OUTPATIENT)
Age: 39
End: 2024-02-29

## 2024-02-29 ENCOUNTER — OUTPATIENT (OUTPATIENT)
Dept: OUTPATIENT SERVICES | Facility: HOSPITAL | Age: 39
LOS: 1 days | End: 2024-02-29

## 2024-02-29 ENCOUNTER — APPOINTMENT (OUTPATIENT)
Dept: MATERNAL FETAL MEDICINE | Facility: HOSPITAL | Age: 39
End: 2024-02-29
Payer: COMMERCIAL

## 2024-02-29 VITALS
WEIGHT: 235 LBS | TEMPERATURE: 98.3 F | BODY MASS INDEX: 43.24 KG/M2 | HEIGHT: 62 IN | SYSTOLIC BLOOD PRESSURE: 111 MMHG | HEART RATE: 94 BPM | DIASTOLIC BLOOD PRESSURE: 62 MMHG

## 2024-02-29 DIAGNOSIS — Z23 ENCOUNTER FOR IMMUNIZATION: ICD-10-CM

## 2024-02-29 DIAGNOSIS — Z98.890 OTHER SPECIFIED POSTPROCEDURAL STATES: Chronic | ICD-10-CM

## 2024-02-29 PROCEDURE — 76816 OB US FOLLOW-UP PER FETUS: CPT | Mod: 26

## 2024-02-29 RX ORDER — ASPIRIN 81 MG/1
81 TABLET, COATED ORAL DAILY
Qty: 60 | Refills: 3 | Status: ACTIVE | COMMUNITY
Start: 2024-02-29 | End: 1900-01-01

## 2024-03-01 DIAGNOSIS — Z34.83 ENCOUNTER FOR SUPERVISION OF OTHER NORMAL PREGNANCY, THIRD TRIMESTER: ICD-10-CM

## 2024-03-01 DIAGNOSIS — E55.9 VITAMIN D DEFICIENCY, UNSPECIFIED: ICD-10-CM

## 2024-03-13 ENCOUNTER — NON-APPOINTMENT (OUTPATIENT)
Age: 39
End: 2024-03-13

## 2024-03-14 ENCOUNTER — APPOINTMENT (OUTPATIENT)
Dept: OBGYN | Facility: HOSPITAL | Age: 39
End: 2024-03-14
Payer: COMMERCIAL

## 2024-03-14 ENCOUNTER — OUTPATIENT (OUTPATIENT)
Dept: OUTPATIENT SERVICES | Facility: HOSPITAL | Age: 39
LOS: 1 days | End: 2024-03-14

## 2024-03-14 VITALS
WEIGHT: 235 LBS | TEMPERATURE: 98.1 F | HEART RATE: 113 BPM | BODY MASS INDEX: 43.24 KG/M2 | HEIGHT: 62 IN | SYSTOLIC BLOOD PRESSURE: 125 MMHG | DIASTOLIC BLOOD PRESSURE: 57 MMHG

## 2024-03-14 DIAGNOSIS — Z98.890 OTHER SPECIFIED POSTPROCEDURAL STATES: Chronic | ICD-10-CM

## 2024-03-14 PROCEDURE — 99213 OFFICE O/P EST LOW 20 MIN: CPT | Mod: 25

## 2024-03-15 DIAGNOSIS — Z34.83 ENCOUNTER FOR SUPERVISION OF OTHER NORMAL PREGNANCY, THIRD TRIMESTER: ICD-10-CM

## 2024-03-21 ENCOUNTER — NON-APPOINTMENT (OUTPATIENT)
Age: 39
End: 2024-03-21

## 2024-03-22 ENCOUNTER — EMERGENCY (EMERGENCY)
Facility: HOSPITAL | Age: 39
LOS: 1 days | Discharge: STILL ADMITTED OR ADMINISTRATIVE | End: 2024-03-22
Admitting: EMERGENCY MEDICINE
Payer: MEDICAID

## 2024-03-22 ENCOUNTER — OUTPATIENT (OUTPATIENT)
Dept: OUTPATIENT SERVICES | Facility: HOSPITAL | Age: 39
LOS: 1 days | Discharge: HOME | End: 2024-03-22
Payer: MEDICAID

## 2024-03-22 VITALS
SYSTOLIC BLOOD PRESSURE: 119 MMHG | DIASTOLIC BLOOD PRESSURE: 64 MMHG | RESPIRATION RATE: 18 BRPM | HEART RATE: 103 BPM | TEMPERATURE: 99 F

## 2024-03-22 VITALS
DIASTOLIC BLOOD PRESSURE: 79 MMHG | OXYGEN SATURATION: 99 % | RESPIRATION RATE: 20 BRPM | HEART RATE: 115 BPM | TEMPERATURE: 98 F | SYSTOLIC BLOOD PRESSURE: 131 MMHG

## 2024-03-22 DIAGNOSIS — Z98.890 OTHER SPECIFIED POSTPROCEDURAL STATES: Chronic | ICD-10-CM

## 2024-03-22 DIAGNOSIS — O26.899 OTHER SPECIFIED PREGNANCY RELATED CONDITIONS, UNSPECIFIED TRIMESTER: ICD-10-CM

## 2024-03-22 PROCEDURE — L9996: CPT

## 2024-03-22 PROCEDURE — 99221 1ST HOSP IP/OBS SF/LOW 40: CPT | Mod: 25

## 2024-03-22 PROCEDURE — 59025 FETAL NON-STRESS TEST: CPT | Mod: 26

## 2024-03-22 RX ORDER — ACETAMINOPHEN 500 MG
3 TABLET ORAL
Qty: 0 | Refills: 0 | DISCHARGE

## 2024-03-22 RX ORDER — IBUPROFEN 200 MG
1 TABLET ORAL
Qty: 0 | Refills: 0 | DISCHARGE

## 2024-03-22 NOTE — ED ADULT TRIAGE NOTE - CHIEF COMPLAINT QUOTE
PT AOX4,  RANDEE , Dr. Israel comes in for contractions since last night, with decreased fetal movement. spoke tp NP Aaliyah. PT AOX4,  RANDEE , Dr. Israel comes in for contractions since last night, with decreased fetal movement today. spoke tp NP Aaliyah.

## 2024-03-23 VITALS — DIASTOLIC BLOOD PRESSURE: 55 MMHG | HEART RATE: 95 BPM | SYSTOLIC BLOOD PRESSURE: 108 MMHG

## 2024-03-23 LAB
APPEARANCE UR: CLEAR — SIGNIFICANT CHANGE UP
BILIRUB UR-MCNC: NEGATIVE — SIGNIFICANT CHANGE UP
COLOR SPEC: YELLOW — SIGNIFICANT CHANGE UP
DIFF PNL FLD: NEGATIVE — SIGNIFICANT CHANGE UP
GLUCOSE UR QL: NEGATIVE MG/DL — SIGNIFICANT CHANGE UP
KETONES UR-MCNC: NEGATIVE MG/DL — SIGNIFICANT CHANGE UP
LEUKOCYTE ESTERASE UR-ACNC: NEGATIVE — SIGNIFICANT CHANGE UP
NITRITE UR-MCNC: NEGATIVE — SIGNIFICANT CHANGE UP
PH UR: 6.5 — SIGNIFICANT CHANGE UP (ref 5–8)
PROT UR-MCNC: NEGATIVE MG/DL — SIGNIFICANT CHANGE UP
SP GR SPEC: 1.02 — SIGNIFICANT CHANGE UP (ref 1–1.03)
UROBILINOGEN FLD QL: 0.2 MG/DL — SIGNIFICANT CHANGE UP (ref 0.2–1)

## 2024-03-23 NOTE — OB PROVIDER TRIAGE NOTE - HISTORY OF PRESENT ILLNESS
37 yo AMA, obese  @ 36.1 wks c/o lower abd cramps once q 4 hrs yesterday and today felt once an hour which has resolved. pt also reports less fetal movement since yesterday- now also resolved. denies vb or lof. AP course complicated by RSV virus 2023 seen in triage and discharged. denies fever chills dysuria constipation ha new swelling vision changes cp or sob. last seen 2 weeks ago next visit on 3/28- received P[facility] at [facility].    meds: PNV ASA  all: denies  PMH: denies  PSH: d&c 2022  gyn hx: denies  ob hx: MAB @ 12 weeks with d&c

## 2024-03-23 NOTE — OB PROVIDER TRIAGE NOTE - ADDITIONAL INSTRUCTIONS
d/w Dr Milan d/c home at 36.1 wks no evidence of PTL, resolved decreased fetal movement  maternal and fetal surveillance reassuring  rest activity as tolerated  increase water intake   labor precautions instructed  keep all OB appointments- clinic 3/28/24  may use maternity band for abdominal support while standing  return for vaginal bleeding, leakage of fluid, decreased fetal movement or any concerns  v/w discharge instructions given with verbal understanding by patient

## 2024-03-23 NOTE — OB PROVIDER TRIAGE NOTE - PLAN OF CARE
d/w Dr Milan d/c home at 36.1 wks no evidence of PTL and resolved decreased fetal movement  maternal and fetal surveillance reassuring  rest activity as tolerated  increase water intake   labor precautions instructed  keep all OB appointments- clinic 3/28/24  may use maternity band for abdominal support while standing  return for vaginal bleeding, leakage of fluid, decreased fetal movement or any concerns  v/w discharge instructions given with verbal understanding by patient

## 2024-03-23 NOTE — OB PROVIDER TRIAGE NOTE - NSHPLABSRESULTS_GEN_ALL_CORE
Urinalysis Basic - ( 23 Mar 2024 01:04 )    Color: Yellow / Appearance: Clear / S.016 / pH: x  Gluc: x / Ketone: Negative mg/dL  / Bili: Negative / Urobili: 0.2 mg/dL   Blood: x / Protein: Negative mg/dL / Nitrite: Negative   Leuk Esterase: Negative / RBC: x / WBC x   Sq Epi: x / Non Sq Epi: x / Bacteria: x

## 2024-03-23 NOTE — OB PROVIDER TRIAGE NOTE - NSHPPHYSICALEXAM_GEN_ALL_CORE
abd soft gravid obese NT  CV RRR  LS clear bilaterally  TAS: images saved on ASOB  vertex  posterior placenta  BPP: 8/8  MAGUI: 12  +FM seen and felt by patient, normal movement  SVE: long closed posterior  FHT: moderate variability + accelerations negative decelerations, reactive FHT  toco: none, 0/10 pain  Vital Signs Last 24 Hrs  T(C): 37.0 (22 Mar 2024 23:41), Max: 37 (22 Mar 2024 23:32)  T(F): 98.6 (22 Mar 2024 23:41), Max: 98.6 (22 Mar 2024 23:32)  HR: 95 (23 Mar 2024 01:39) (95 - 115)  BP: 108/55 (23 Mar 2024 01:39) (108/55 - 131/79)  BP(mean): --  RR: 18 (22 Mar 2024 23:32) (18 - 20)  SpO2: 99% (22 Mar 2024 22:59) (99% - 99%)    Parameters below as of 22 Mar 2024 23:32  Patient On (Oxygen Delivery Method): room air

## 2024-03-25 DIAGNOSIS — O99.513 DISEASES OF THE RESPIRATORY SYSTEM COMPLICATING PREGNANCY, THIRD TRIMESTER: ICD-10-CM

## 2024-03-25 DIAGNOSIS — O26.893 OTHER SPECIFIED PREGNANCY RELATED CONDITIONS, THIRD TRIMESTER: ICD-10-CM

## 2024-03-25 DIAGNOSIS — O09.293 SUPERVISION OF PREGNANCY WITH OTHER POOR REPRODUCTIVE OR OBSTETRIC HISTORY, THIRD TRIMESTER: ICD-10-CM

## 2024-03-25 DIAGNOSIS — Z3A.36 36 WEEKS GESTATION OF PREGNANCY: ICD-10-CM

## 2024-03-25 DIAGNOSIS — O99.891 OTHER SPECIFIED DISEASES AND CONDITIONS COMPLICATING PREGNANCY: ICD-10-CM

## 2024-03-25 DIAGNOSIS — R10.30 LOWER ABDOMINAL PAIN, UNSPECIFIED: ICD-10-CM

## 2024-03-25 DIAGNOSIS — J45.909 UNSPECIFIED ASTHMA, UNCOMPLICATED: ICD-10-CM

## 2024-03-25 DIAGNOSIS — N20.0 CALCULUS OF KIDNEY: ICD-10-CM

## 2024-03-25 DIAGNOSIS — O36.8130 DECREASED FETAL MOVEMENTS, THIRD TRIMESTER, NOT APPLICABLE OR UNSPECIFIED: ICD-10-CM

## 2024-03-25 DIAGNOSIS — O09.523 SUPERVISION OF ELDERLY MULTIGRAVIDA, THIRD TRIMESTER: ICD-10-CM

## 2024-03-28 ENCOUNTER — ASOB RESULT (OUTPATIENT)
Age: 39
End: 2024-03-28

## 2024-03-28 ENCOUNTER — OUTPATIENT (OUTPATIENT)
Dept: OUTPATIENT SERVICES | Facility: HOSPITAL | Age: 39
LOS: 1 days | End: 2024-03-28

## 2024-03-28 ENCOUNTER — RESULT REVIEW (OUTPATIENT)
Age: 39
End: 2024-03-28

## 2024-03-28 ENCOUNTER — APPOINTMENT (OUTPATIENT)
Dept: MATERNAL FETAL MEDICINE | Facility: HOSPITAL | Age: 39
End: 2024-03-28
Payer: COMMERCIAL

## 2024-03-28 ENCOUNTER — APPOINTMENT (OUTPATIENT)
Dept: OBGYN | Facility: HOSPITAL | Age: 39
End: 2024-03-28
Payer: COMMERCIAL

## 2024-03-28 VITALS
TEMPERATURE: 98 F | SYSTOLIC BLOOD PRESSURE: 128 MMHG | HEART RATE: 104 BPM | HEIGHT: 62 IN | WEIGHT: 237 LBS | DIASTOLIC BLOOD PRESSURE: 62 MMHG | BODY MASS INDEX: 43.61 KG/M2

## 2024-03-28 DIAGNOSIS — Z98.890 OTHER SPECIFIED POSTPROCEDURAL STATES: Chronic | ICD-10-CM

## 2024-03-28 LAB
BASOPHILS # BLD AUTO: 0.04 K/UL — SIGNIFICANT CHANGE UP (ref 0–0.2)
BASOPHILS NFR BLD AUTO: 0.4 % — SIGNIFICANT CHANGE UP (ref 0–2)
EOSINOPHIL # BLD AUTO: 0.13 K/UL — SIGNIFICANT CHANGE UP (ref 0–0.5)
EOSINOPHIL NFR BLD AUTO: 1.3 % — SIGNIFICANT CHANGE UP (ref 0–6)
HCT VFR BLD CALC: 32.1 % — LOW (ref 34.5–45)
HGB BLD-MCNC: 10.2 G/DL — LOW (ref 11.5–15.5)
HIV 1+2 AB+HIV1 P24 AG SERPL QL IA: SIGNIFICANT CHANGE UP
IANC: 7.49 K/UL — HIGH (ref 1.8–7.4)
IMM GRANULOCYTES NFR BLD AUTO: 1.3 % — HIGH (ref 0–0.9)
LYMPHOCYTES # BLD AUTO: 1.84 K/UL — SIGNIFICANT CHANGE UP (ref 1–3.3)
LYMPHOCYTES # BLD AUTO: 17.7 % — SIGNIFICANT CHANGE UP (ref 13–44)
MCHC RBC-ENTMCNC: 25.2 PG — LOW (ref 27–34)
MCHC RBC-ENTMCNC: 31.8 GM/DL — LOW (ref 32–36)
MCV RBC AUTO: 79.5 FL — LOW (ref 80–100)
MONOCYTES # BLD AUTO: 0.75 K/UL — SIGNIFICANT CHANGE UP (ref 0–0.9)
MONOCYTES NFR BLD AUTO: 7.2 % — SIGNIFICANT CHANGE UP (ref 2–14)
NEUTROPHILS # BLD AUTO: 7.49 K/UL — HIGH (ref 1.8–7.4)
NEUTROPHILS NFR BLD AUTO: 72.1 % — SIGNIFICANT CHANGE UP (ref 43–77)
NRBC # BLD: 0 /100 WBCS — SIGNIFICANT CHANGE UP (ref 0–0)
NRBC # FLD: 0 K/UL — SIGNIFICANT CHANGE UP (ref 0–0)
PLATELET # BLD AUTO: 348 K/UL — SIGNIFICANT CHANGE UP (ref 150–400)
RBC # BLD: 4.04 M/UL — SIGNIFICANT CHANGE UP (ref 3.8–5.2)
RBC # FLD: 15.2 % — HIGH (ref 10.3–14.5)
WBC # BLD: 10.38 K/UL — SIGNIFICANT CHANGE UP (ref 3.8–10.5)
WBC # FLD AUTO: 10.38 K/UL — SIGNIFICANT CHANGE UP (ref 3.8–10.5)

## 2024-03-28 PROCEDURE — 76819 FETAL BIOPHYS PROFIL W/O NST: CPT | Mod: 26,59

## 2024-03-28 PROCEDURE — 99213 OFFICE O/P EST LOW 20 MIN: CPT | Mod: 25

## 2024-03-28 PROCEDURE — 76816 OB US FOLLOW-UP PER FETUS: CPT | Mod: 26

## 2024-03-28 RX ORDER — CHLORHEXIDINE GLUCONATE 4 %
325 (65 FE) LIQUID (ML) TOPICAL DAILY
Qty: 30 | Refills: 5 | Status: ACTIVE | COMMUNITY
Start: 2024-03-28 | End: 2024-09-24

## 2024-03-29 LAB
C TRACH RRNA SPEC QL NAA+PROBE: SIGNIFICANT CHANGE UP
GROUP B BETA STREP DNA (PCR): SIGNIFICANT CHANGE UP
N GONORRHOEA RRNA SPEC QL NAA+PROBE: SIGNIFICANT CHANGE UP
SOURCE GROUP B STREP: SIGNIFICANT CHANGE UP
SPECIMEN SOURCE: SIGNIFICANT CHANGE UP

## 2024-03-31 ENCOUNTER — EMERGENCY (EMERGENCY)
Facility: HOSPITAL | Age: 39
LOS: 1 days | Discharge: STILL ADMITTED OR ADMINISTRATIVE | End: 2024-03-31
Admitting: EMERGENCY MEDICINE
Payer: MEDICAID

## 2024-03-31 ENCOUNTER — OUTPATIENT (OUTPATIENT)
Dept: OUTPATIENT SERVICES | Facility: HOSPITAL | Age: 39
LOS: 1 days | Discharge: HOME | End: 2024-03-31
Payer: MEDICAID

## 2024-03-31 VITALS
DIASTOLIC BLOOD PRESSURE: 70 MMHG | HEART RATE: 119 BPM | TEMPERATURE: 98 F | SYSTOLIC BLOOD PRESSURE: 114 MMHG | RESPIRATION RATE: 126 BRPM

## 2024-03-31 VITALS
HEART RATE: 118 BPM | TEMPERATURE: 98 F | SYSTOLIC BLOOD PRESSURE: 146 MMHG | OXYGEN SATURATION: 96 % | DIASTOLIC BLOOD PRESSURE: 79 MMHG | RESPIRATION RATE: 16 BRPM

## 2024-03-31 VITALS — DIASTOLIC BLOOD PRESSURE: 55 MMHG | HEART RATE: 96 BPM | SYSTOLIC BLOOD PRESSURE: 109 MMHG

## 2024-03-31 DIAGNOSIS — O26.899 OTHER SPECIFIED PREGNANCY RELATED CONDITIONS, UNSPECIFIED TRIMESTER: ICD-10-CM

## 2024-03-31 DIAGNOSIS — Z98.890 OTHER SPECIFIED POSTPROCEDURAL STATES: Chronic | ICD-10-CM

## 2024-03-31 PROCEDURE — 99221 1ST HOSP IP/OBS SF/LOW 40: CPT | Mod: 25

## 2024-03-31 PROCEDURE — L9996: CPT

## 2024-03-31 PROCEDURE — 59025 FETAL NON-STRESS TEST: CPT | Mod: 26

## 2024-03-31 NOTE — OB PROVIDER TRIAGE NOTE - HISTORY OF PRESENT ILLNESS
39 yo AMA, obese  @ 37 2/7 wks c/o contractions that started last night pain 5/10. denies vaginal bleeding, leakage of fluid and report positive fetal movement.   AP course uncomplicated as per patient.    Last seen 3/28- received PNC at [facility].    meds: PNV ASA  all: denies  PMH: denies  PSH: d&c 2022  gyn hx: denies   39 yo AMA, obese  @ 37 2/7 wks c/o contractions that started last night pain 5/10. denies vaginal bleeding, leakage of fluid and report positive fetal movement.   AP course uncomplicated as per patient.    Last seen 3/28- received PNC at [facility].    GBS negative 24  meds: PNV ASA  allergies: denies

## 2024-03-31 NOTE — OB PROVIDER TRIAGE NOTE - NSOBPROVIDERNOTE_OBGYN_ALL_OB_FT
d/w Dr Milan d/c home at 36.1 wks no evidence of PTL, resolved decreased fetal movement  maternal and fetal surveillance reassuring  rest activity as tolerated  increase water intake   labor precautions instructed  keep all OB appointments- clinic 3/28/24  may use maternity band for abdominal support while standing  return for vaginal bleeding, leakage of fluid, decreased fetal movement or any concerns  v/w discharge instructions given with verbal understanding by patient d/w Dr Rivera d/c home at 37 2/7 wks no evidence   maternal and fetal surveillance reassuring  rest activity as tolerated  increase water intake   labor precautions instructed  keep all OB appointments- clinic   may use maternity band for abdominal support while standing  return for vaginal bleeding, leakage of fluid, decreased fetal movement or any concerns  v/w discharge instructions given with verbal understanding by patient d/w Dr Rivera Chief residence and Dr. Huertas d/c home at 37 2/7 wks no evidence of labor at this time  maternal and fetal surveillance reassuring  rest activity as tolerated  increase water intake   labor precautions instructed  keep all OB appointments- clinic   may use maternity band for abdominal support while standing  return for vaginal bleeding, leakage of fluid, decreased fetal movement or any concerns  v/w discharge instructions given with verbal understanding by patient    Discharged home @0220A    WAlondra hernandez NP [id number] d/w Dr Rivera Chief residence and Dr. Huertas d/c home at 37 2/7 wks no evidence of labor at this time  maternal and fetal surveillance reassuring  rest activity as tolerated  increase water intake   labor precautions instructed  keep all OB appointments- clinic   may use maternity band for abdominal support while standing  return for vaginal bleeding, leakage of fluid, decreased fetal movement or any concerns  v/w discharge instructions given with verbal understanding by patient    Discharged home @0220A    GEOVANI hernandez NP

## 2024-03-31 NOTE — ED ADULT TRIAGE NOTE - CHIEF COMPLAINT QUOTE
alert oriented 37 weeks pregnant c/o abd pain and back pain 1st baby  no vaginal discharge no hx spoke to Christopher will go to [facility]

## 2024-03-31 NOTE — OB PROVIDER TRIAGE NOTE - NSPREVIOUSTERM_OBGYN_ALL_OB
Sara is here for a physical and Pap.     Patient is here for a full physical exam and pap.  Pre-Visit Screening :  Immunizations : up to date  Colonoscopy : na  Mammogram : is up to date  Asthma Action Plan/Test : na  PHQ9/GAD7 : na    [unfilled]  ETOH screening:  Questions:  1-How often do you have a drink containing alcohol?                             0 times per   2-How many drinks containing alcohol do you have on a typical day when you are         Drinking?                              never   3- How often do you have 5 or more drinks on one occasion?                               Never    Have you ever:  @None of the patient's responses to the CAGE screening were positive / Negative CAGE score@       No

## 2024-03-31 NOTE — OB PROVIDER TRIAGE NOTE - NSHPPHYSICALEXAM_GEN_ALL_CORE
abd soft gravid obese NT  CV RRR  LS clear bilaterally  TAS: images saved on ASOB  vertex  posterior placenta  BPP: 8/8  MAGUI: 20.48  SVE: long closed posterior  FHT: moderate variability + accelerations negative decelerations, reactive FHT  toco: none, 0/10 pain  Vital Signs Last 24 Hrs  T(C): 37.0 (22 Mar 2024 23:41), Max: 37 (22 Mar 2024 23:32)  T(F): 98.6 (22 Mar 2024 23:41), Max: 98.6 (22 Mar 2024 23:32)  HR: 95 (23 Mar 2024 01:39) (95 - 115)  BP: 108/55 (23 Mar 2024 01:39) (108/55 - 131/79)  BP(mean): --  RR: 18 (22 Mar 2024 23:32) (18 - 20)  SpO2: 99% (22 Mar 2024 22:59) (99% - 99%)    Parameters below as of 22 Mar 2024 23:32  Patient On (Oxygen Delivery Method): room air

## 2024-03-31 NOTE — OB RN TRIAGE NOTE - FALL HARM RISK - UNIVERSAL INTERVENTIONS
Bed in lowest position, wheels locked, appropriate side rails in place/Call bell, personal items and telephone in reach/Instruct patient to call for assistance before getting out of bed or chair/Non-slip footwear when patient is out of bed/[place] to call system/Physically safe environment - no spills, clutter or unnecessary equipment/Purposeful Proactive Rounding/Room/bathroom lighting operational, light cord in reach

## 2024-04-01 DIAGNOSIS — Z34.83 ENCOUNTER FOR SUPERVISION OF OTHER NORMAL PREGNANCY, THIRD TRIMESTER: ICD-10-CM

## 2024-04-02 DIAGNOSIS — O47.1 FALSE LABOR AT OR AFTER 37 COMPLETED WEEKS OF GESTATION: ICD-10-CM

## 2024-04-02 DIAGNOSIS — O99.213 OBESITY COMPLICATING PREGNANCY, THIRD TRIMESTER: ICD-10-CM

## 2024-04-02 DIAGNOSIS — O09.293 SUPERVISION OF PREGNANCY WITH OTHER POOR REPRODUCTIVE OR OBSTETRIC HISTORY, THIRD TRIMESTER: ICD-10-CM

## 2024-04-02 DIAGNOSIS — E66.01 MORBID (SEVERE) OBESITY DUE TO EXCESS CALORIES: ICD-10-CM

## 2024-04-02 DIAGNOSIS — O09.523 SUPERVISION OF ELDERLY MULTIGRAVIDA, THIRD TRIMESTER: ICD-10-CM

## 2024-04-02 DIAGNOSIS — Z3A.37 37 WEEKS GESTATION OF PREGNANCY: ICD-10-CM

## 2024-04-02 DIAGNOSIS — O99.891 OTHER SPECIFIED DISEASES AND CONDITIONS COMPLICATING PREGNANCY: ICD-10-CM

## 2024-04-02 DIAGNOSIS — O99.513 DISEASES OF THE RESPIRATORY SYSTEM COMPLICATING PREGNANCY, THIRD TRIMESTER: ICD-10-CM

## 2024-04-02 DIAGNOSIS — N20.0 CALCULUS OF KIDNEY: ICD-10-CM

## 2024-04-02 DIAGNOSIS — J45.909 UNSPECIFIED ASTHMA, UNCOMPLICATED: ICD-10-CM

## 2024-04-03 ENCOUNTER — NON-APPOINTMENT (OUTPATIENT)
Age: 39
End: 2024-04-03

## 2024-04-03 ENCOUNTER — OUTPATIENT (OUTPATIENT)
Dept: INPATIENT UNIT | Facility: HOSPITAL | Age: 39
LOS: 1 days | Discharge: HOME | End: 2024-04-03
Payer: MEDICAID

## 2024-04-03 VITALS
HEART RATE: 118 BPM | TEMPERATURE: 98 F | DIASTOLIC BLOOD PRESSURE: 66 MMHG | RESPIRATION RATE: 18 BRPM | SYSTOLIC BLOOD PRESSURE: 129 MMHG

## 2024-04-03 DIAGNOSIS — O26.899 OTHER SPECIFIED PREGNANCY RELATED CONDITIONS, UNSPECIFIED TRIMESTER: ICD-10-CM

## 2024-04-03 DIAGNOSIS — Z98.890 OTHER SPECIFIED POSTPROCEDURAL STATES: Chronic | ICD-10-CM

## 2024-04-03 PROCEDURE — 99221 1ST HOSP IP/OBS SF/LOW 40: CPT

## 2024-04-03 NOTE — OB RN TRIAGE NOTE - FALL HARM RISK - UNIVERSAL INTERVENTIONS
Bed in lowest position, wheels locked, appropriate side rails in place/Call bell, personal items and telephone in reach/Instruct patient to call for assistance before getting out of bed or chair/Non-slip footwear when patient is out of bed/[place] to call system/Physically safe environment - no spills, clutter or unnecessary equipment/Purposeful Proactive Rounding/Room/bathroom lighting operational, light cord in reach

## 2024-04-04 ENCOUNTER — OUTPATIENT (OUTPATIENT)
Dept: OUTPATIENT SERVICES | Facility: HOSPITAL | Age: 39
LOS: 1 days | End: 2024-04-04

## 2024-04-04 ENCOUNTER — APPOINTMENT (OUTPATIENT)
Dept: OBGYN | Facility: HOSPITAL | Age: 39
End: 2024-04-04
Payer: COMMERCIAL

## 2024-04-04 VITALS
HEART RATE: 110 BPM | HEIGHT: 62 IN | DIASTOLIC BLOOD PRESSURE: 66 MMHG | BODY MASS INDEX: 43.79 KG/M2 | WEIGHT: 238 LBS | SYSTOLIC BLOOD PRESSURE: 118 MMHG | TEMPERATURE: 97.8 F

## 2024-04-04 VITALS — SYSTOLIC BLOOD PRESSURE: 130 MMHG | DIASTOLIC BLOOD PRESSURE: 81 MMHG | HEART RATE: 96 BPM

## 2024-04-04 PROCEDURE — 99213 OFFICE O/P EST LOW 20 MIN: CPT | Mod: 25

## 2024-04-04 PROCEDURE — 93010 ELECTROCARDIOGRAM REPORT: CPT

## 2024-04-04 RX ORDER — CHLORHEXIDINE GLUCONATE 4 %
325 (65 FE) LIQUID (ML) TOPICAL DAILY
Qty: 30 | Refills: 5 | Status: ACTIVE | COMMUNITY
Start: 2024-04-04 | End: 2024-10-01

## 2024-04-04 RX ORDER — DOCUSATE SODIUM 100 MG/1
100 CAPSULE ORAL
Qty: 60 | Refills: 3 | Status: ACTIVE | COMMUNITY
Start: 2024-04-04 | End: 1900-01-01

## 2024-04-04 NOTE — OB PROVIDER TRIAGE NOTE - NS_DCDISPOSITION_OBGYN_ALL_OB
Treated Area: medium area Repetition Rate (Hz): 10 Fluence (J/Cm2): 25 Preprocedure Text: The treatment areas were thoroughly cleaned. Any exposed at risk hair that was not to be treated was covered in white paper tape. Clear ultrasound gel was applied to the treatment area. The area was treated with no immediate stacking of pulses. Pulse Duration Units: milliseconds Detail Level: Zone Anesthesia Volume In Cc: 0 Consent: Written consent obtained, risks reviewed including but not limited to crusting, scabbing, blistering, scarring, darker or lighter pigmentary change, bruising, and/or incomplete response. Pulse Duration: 20 Treatment Number: 5 Passes: 1 Post Procedure Text: The patient tolerated the procedure well. Post-Care Instructions: I reviewed with the patient in detail post-care instructions. Patient should stay away from the sun and wear sun protection until treated areas are fully healed Additional Comments (Optional): no backside or labia as hair was too light and long Hide Repetition Rate?: No Spot Size: Finesse Adapter Size: 15 x 15 mm square Cooling (In C): 15 Cooling ?: Yes Cooling (In C): 14 Repetition Rate (Hz): 18 Repetition Rate (Hz): 12 Location Override (Will Not Show Above If Text Entered): Jordanian Spot Size: Finesse Adapter Size: 15 x 45 mm (No Finesse Adapter) Device Serial Number (Optional): model: Lina Bellkarri [id number] Home

## 2024-04-04 NOTE — OB PROVIDER TRIAGE NOTE - NSOBPROVIDERNOTE_OBGYN_ALL_OB_FT
39 y/o pt 37.5 weeks  presents in early labor   d/w 37 y/o pt 37.5 weeks  presents in early labor   d/w Dr. Bishop & Dr. Malloy  Maternal and fetal status reassuring  Maternal HR 96bpm  Plan:  -Patient cleared for discharge   -Patient will follow up with next scheduled appointment  -Fetal kick counts reviewed with patient   -Labor precautions reviewed with patient  -Written and verbal instructions given to patient, pt verbalizes understanding of all information given

## 2024-04-04 NOTE — OB PROVIDER TRIAGE NOTE - HISTORY OF PRESENT ILLNESS
38 y.o pt 37.5 weeks  presents to triage with c/o worsening contractions and pelvic pressure. pt reports ctx every 9 minutes and reports 6/10 pain. pt denies any lof or bleeding. pt denies n/v/d, fever or chills. pt endorses +fetal movement   Ap uncomplicated thus far   PNC at [facility]

## 2024-04-04 NOTE — OB PROVIDER TRIAGE NOTE - ADDITIONAL INSTRUCTIONS
-Patient will follow up with next scheduled appointment  -Fetal kick counts reviewed with patient   -Labor precautions reviewed with patient

## 2024-04-04 NOTE — OB PROVIDER TRIAGE NOTE - NSHPPHYSICALEXAM_GEN_ALL_CORE
Vital Signs Last 24 Hrs  T(C): 36.8 (03 Apr 2024 23:43), Max: 36.8 (03 Apr 2024 23:37)  T(F): 98.24 (03 Apr 2024 23:43), Max: 98.24 (03 Apr 2024 23:43)  HR: 101 (04 Apr 2024 00:47) (97 - 118)  BP: 123/64 (03 Apr 2024 23:46) (123/64 - 129/66)  BP(mean): --  RR: 18 (03 Apr 2024 23:43) (18 - 18)  SpO2: 99% (04 Apr 2024 00:47) (92% - 99%)    Abdomen: soft, non tender. no guarding or rebound tenderness  SVE: 1/50/-3    EFM: Baseline 120bpm with moderate variability, +accels  toco: irregular ctx noted

## 2024-04-05 DIAGNOSIS — R00.0 TACHYCARDIA, UNSPECIFIED: ICD-10-CM

## 2024-04-05 DIAGNOSIS — O47.1 FALSE LABOR AT OR AFTER 37 COMPLETED WEEKS OF GESTATION: ICD-10-CM

## 2024-04-05 DIAGNOSIS — O26.893 OTHER SPECIFIED PREGNANCY RELATED CONDITIONS, THIRD TRIMESTER: ICD-10-CM

## 2024-04-05 DIAGNOSIS — Z3A.37 37 WEEKS GESTATION OF PREGNANCY: ICD-10-CM

## 2024-04-05 DIAGNOSIS — J45.909 UNSPECIFIED ASTHMA, UNCOMPLICATED: ICD-10-CM

## 2024-04-05 DIAGNOSIS — Z34.83 ENCOUNTER FOR SUPERVISION OF OTHER NORMAL PREGNANCY, THIRD TRIMESTER: ICD-10-CM

## 2024-04-05 DIAGNOSIS — O09.523 SUPERVISION OF ELDERLY MULTIGRAVIDA, THIRD TRIMESTER: ICD-10-CM

## 2024-04-05 DIAGNOSIS — O99.513 DISEASES OF THE RESPIRATORY SYSTEM COMPLICATING PREGNANCY, THIRD TRIMESTER: ICD-10-CM

## 2024-04-05 DIAGNOSIS — O09.293 SUPERVISION OF PREGNANCY WITH OTHER POOR REPRODUCTIVE OR OBSTETRIC HISTORY, THIRD TRIMESTER: ICD-10-CM

## 2024-04-09 NOTE — ED PROVIDER NOTE - CCCP TRG CHIEF CMPLNT
04/09/24        William Fong  [address] [facility] [address]      Dear William    Your Epidural Steroid Injection procedure is scheduled at the [facility] with Dr. Sachin Lawrence on April 26, 2024. Please register at the [facility] at 7:15 am.    [facility]   [address] [place] (Please check in on the 2nd floor)  [place], [place]  [id number] [phone]    You can expect to be contacted 1 to 3 days prior to the surgery to confirm arrival and surgery time. These times may change due to various OR schedule needs. We will call you ASAP if this happens.    The following appointment(s) have been scheduled for you:     Post-op with Dr. Lawrence at the [facility] on May 15, 2024 at 9:45 am.    To better prepare for your surgery, please follow these instructions:    If you become sick, are having fevers, are on antibiotics, or are experiencing illness of any type, please call and alert us as your procedure will need to be rescheduled.        You may eat a light breakfast or lunch before the procedure.    You may drive yourself to the procedure if you wish.    Hold all NSAIDs (ibuprofen/Motrin/Advil, naproxen/Aleve, meloxicam, Celebrex, diclofenac, etc) 4 days prior to procedure     Hold all supplements (vitamins, fish oil, and herbal supplements) 7 days before and 24 hours after your procedure.    Special considerations for your upcoming procedure due to Covid-19:  -Day of your procedure when you arrive you will have a screening done that includes taking your temperature.         -Please note you would need to wait 14 days before and after a COVID 19 vaccine  -We suggest self-quarantine prior to your procedure.      If you have questions regarding the procedure, medications, rehab, etc., please contact the nursing staff at [facility] - central scheduling line at [phone].    If you have any  scheduling questions or need to reschedule, please contact me at the telephone number listed below.       Thank you,    Loco NUNEZ - [phone]  [facility] - [phone]  Surgery Scheduler for Dr. [facility]    We understand that unplanned events may cause you to miss your appointments.  If you are unable to attend the appointment for your procedure, give us at least 24 hours of notice if possible.  Please be aware that if you miss your procedure appointment without notifying us in advance, we may no longer be able to serve you as your pain management provider.                               Insurance Authorization Need to Know’s    Prior to your surgical procedure, our team will contact your Insurance Company to initiate a PreAuthorization request.      This is not a guarantee of payment from your insurance company, but rather a step taken to ensure that we have all of the information and documentation for them to confirm the procedure is one that is eligible for coverage under your plan.    We will contact you if we either need more information from you to fulfill the requirements of your insurance company, or if we need to discuss any concerns that may lead to postponement or cancellation of your procedure. If you have any questions regarding your surgery authorization, please check with your insurance company or call our office for an update.    If you need information regarding your level of benefits or out-of-pocket expenses, please contact your insurance company directly.  They can also confirm for you whether or not we (the surgeon and the hospital/surgery center) are in your plan’s preferred network (aka ‘in-network’).    What to do if… My Insurance Changes:  If, at any time, your insurance company, plan or even card changes… Please call our office so that our team can be sure to update your records.  We will need to make sure to submit any PreAuth or magdalena to the correct, up-to-date  insurance plan.      What to do if… My Insurance Requires A Referral:  If your insurance company requires a Referral for Specialty Care or to see a Specialist, you will need to confirm with them if you have one on file.    If your insurance carrier does not have a referral, then you will need to contact your Primary Care Physician to have one directly submitted to your insurance company ASAP.    Without a referral on file, your insurance company will not Pre-Authorize your surgery and may not cover any of your care with our specialty.    What to do if… I have a Workers Compensation (W/C) Claim:  If you have a W/C claim, please be sure to provide our reception team with the information you have regarding your claim ASAP.  We will contact your W/C carrier/adjustor to inform them of your upcoming surgery and check the status of your claim (open vs closed).  We will let you know if they advise of any concerns or issues with your claim.  Even if you have an open W/C claim, please also provide us with your personal/family insurance.  We will want to be sure this plan is loaded into your account.  We always PreAuthorize with personal insurance as a back-up to W/C.  Otherwise, if W/C doesn’t cover something along the way, you will receive a bill for the services.    What to do if… I have Month-to-Month Coverage/Premiums:  If you have an insurance plan that is paid for month to month, or is subject to plan change on a monthly basis, please be aware we cannot initiate PreAuthorization until just before the month of your surgery, as your insurance company will need to verify your premium payments/eligibility first.    What to do if… I Do Not Have Insurance Coverage or Have other Insurance/Billing Questions:  Please call our Patient Contact Center:  [phone] to speak with a team member about your billing needs, including possible coverage options, setting up payment plans, our fee schedule, etc.          right flank pain

## 2024-04-11 ENCOUNTER — APPOINTMENT (OUTPATIENT)
Dept: OBGYN | Facility: HOSPITAL | Age: 39
End: 2024-04-11
Payer: COMMERCIAL

## 2024-04-11 ENCOUNTER — OUTPATIENT (OUTPATIENT)
Dept: OUTPATIENT SERVICES | Facility: HOSPITAL | Age: 39
LOS: 1 days | End: 2024-04-11

## 2024-04-11 VITALS
WEIGHT: 241 LBS | TEMPERATURE: 97.7 F | SYSTOLIC BLOOD PRESSURE: 120 MMHG | HEART RATE: 108 BPM | HEIGHT: 62 IN | DIASTOLIC BLOOD PRESSURE: 62 MMHG | BODY MASS INDEX: 44.35 KG/M2

## 2024-04-11 DIAGNOSIS — Z98.890 OTHER SPECIFIED POSTPROCEDURAL STATES: Chronic | ICD-10-CM

## 2024-04-11 PROCEDURE — 99213 OFFICE O/P EST LOW 20 MIN: CPT | Mod: 25

## 2024-04-12 DIAGNOSIS — Z34.83 ENCOUNTER FOR SUPERVISION OF OTHER NORMAL PREGNANCY, THIRD TRIMESTER: ICD-10-CM

## 2024-04-13 ENCOUNTER — TRANSCRIPTION ENCOUNTER (OUTPATIENT)
Age: 39
End: 2024-04-13

## 2024-04-13 ENCOUNTER — INPATIENT (INPATIENT)
Facility: HOSPITAL | Age: 39
LOS: 4 days | Discharge: HOME HEALTH | End: 2024-04-18
Attending: SPECIALIST | Admitting: SPECIALIST
Payer: MEDICAID

## 2024-04-13 VITALS
SYSTOLIC BLOOD PRESSURE: 115 MMHG | HEIGHT: 62 IN | DIASTOLIC BLOOD PRESSURE: 57 MMHG | RESPIRATION RATE: 17 BRPM | WEIGHT: 240.08 LBS | TEMPERATURE: 99 F | HEART RATE: 100 BPM

## 2024-04-13 DIAGNOSIS — O36.60X0 MATERNAL CARE FOR EXCESSIVE FETAL GROWTH, UNSPECIFIED TRIMESTER, NOT APPLICABLE OR UNSPECIFIED: ICD-10-CM

## 2024-04-13 DIAGNOSIS — Z98.890 OTHER SPECIFIED POSTPROCEDURAL STATES: Chronic | ICD-10-CM

## 2024-04-13 LAB
BASOPHILS # BLD AUTO: 0.04 K/UL — SIGNIFICANT CHANGE UP (ref 0–0.2)
BASOPHILS NFR BLD AUTO: 0.4 % — SIGNIFICANT CHANGE UP (ref 0–2)
BLD GP AB SCN SERPL QL: NEGATIVE — SIGNIFICANT CHANGE UP
EOSINOPHIL # BLD AUTO: 0.08 K/UL — SIGNIFICANT CHANGE UP (ref 0–0.5)
EOSINOPHIL NFR BLD AUTO: 0.8 % — SIGNIFICANT CHANGE UP (ref 0–6)
HCT VFR BLD CALC: 33.1 % — LOW (ref 34.5–45)
HGB BLD-MCNC: 10.2 G/DL — LOW (ref 11.5–15.5)
IANC: 7.11 K/UL — SIGNIFICANT CHANGE UP (ref 1.8–7.4)
IMM GRANULOCYTES NFR BLD AUTO: 1.1 % — HIGH (ref 0–0.9)
LYMPHOCYTES # BLD AUTO: 1.6 K/UL — SIGNIFICANT CHANGE UP (ref 1–3.3)
LYMPHOCYTES # BLD AUTO: 16.5 % — SIGNIFICANT CHANGE UP (ref 13–44)
MCHC RBC-ENTMCNC: 24.8 PG — LOW (ref 27–34)
MCHC RBC-ENTMCNC: 30.8 GM/DL — LOW (ref 32–36)
MCV RBC AUTO: 80.3 FL — SIGNIFICANT CHANGE UP (ref 80–100)
MONOCYTES # BLD AUTO: 0.78 K/UL — SIGNIFICANT CHANGE UP (ref 0–0.9)
MONOCYTES NFR BLD AUTO: 8 % — SIGNIFICANT CHANGE UP (ref 2–14)
NEUTROPHILS # BLD AUTO: 7.11 K/UL — SIGNIFICANT CHANGE UP (ref 1.8–7.4)
NEUTROPHILS NFR BLD AUTO: 73.2 % — SIGNIFICANT CHANGE UP (ref 43–77)
NRBC # BLD: 0 /100 WBCS — SIGNIFICANT CHANGE UP (ref 0–0)
NRBC # FLD: 0 K/UL — SIGNIFICANT CHANGE UP (ref 0–0)
PLATELET # BLD AUTO: 298 K/UL — SIGNIFICANT CHANGE UP (ref 150–400)
RBC # BLD: 4.12 M/UL — SIGNIFICANT CHANGE UP (ref 3.8–5.2)
RBC # FLD: 17.3 % — HIGH (ref 10.3–14.5)
RH IG SCN BLD-IMP: POSITIVE — SIGNIFICANT CHANGE UP
RH IG SCN BLD-IMP: POSITIVE — SIGNIFICANT CHANGE UP
WBC # BLD: 9.72 K/UL — SIGNIFICANT CHANGE UP (ref 3.8–10.5)
WBC # FLD AUTO: 9.72 K/UL — SIGNIFICANT CHANGE UP (ref 3.8–10.5)

## 2024-04-13 RX ORDER — CHLORHEXIDINE GLUCONATE 213 G/1000ML
1 SOLUTION TOPICAL DAILY
Refills: 0 | Status: DISCONTINUED | OUTPATIENT
Start: 2024-04-13 | End: 2024-04-14

## 2024-04-13 RX ORDER — SODIUM CHLORIDE 9 MG/ML
1000 INJECTION, SOLUTION INTRAVENOUS
Refills: 0 | Status: DISCONTINUED | OUTPATIENT
Start: 2024-04-13 | End: 2024-04-15

## 2024-04-13 RX ORDER — OXYTOCIN 10 UNIT/ML
333.33 VIAL (ML) INJECTION
Qty: 20 | Refills: 0 | Status: COMPLETED | OUTPATIENT
Start: 2024-04-13 | End: 2024-04-13

## 2024-04-13 RX ORDER — CITRIC ACID/SODIUM CITRATE 300-500 MG
15 SOLUTION, ORAL ORAL EVERY 6 HOURS
Refills: 0 | Status: DISCONTINUED | OUTPATIENT
Start: 2024-04-13 | End: 2024-04-15

## 2024-04-13 RX ADMIN — SODIUM CHLORIDE 125 MILLILITER(S): 9 INJECTION, SOLUTION INTRAVENOUS at 19:29

## 2024-04-13 RX ADMIN — SODIUM CHLORIDE 125 MILLILITER(S): 9 INJECTION, SOLUTION INTRAVENOUS at 18:38

## 2024-04-13 RX ADMIN — CHLORHEXIDINE GLUCONATE 1 APPLICATION(S): 213 SOLUTION TOPICAL at 19:28

## 2024-04-13 NOTE — OB RN PATIENT PROFILE - AS TEMP SITE
Patient would like to decrease venlafaxine dose as recommended. Please send prescription to pharmacy.     Josiane Akins RN  [facility]    oral

## 2024-04-13 NOTE — OB PROVIDER H&P - RUBELLA: DATE, OB PROFILE
Neurology Consult Subjective:  
  
Marcia Lindsay  is a 59 y.o.  male with a  Medical history of CAD, Hypertension, dyslipidemia, morbid obesity s/p stents placed in 2012 and during that time he was put on ASA, Plavix, Statin, ACE and Beta blockers . Had an ejection fracture of 50% also in 2012. It is noted that patient stopped following up with cardiology and is no longer taking any of his cardiac meds due to its interference  with his ability to work as a  . He  was admitted on 01/24/2018 due to a complaint of acute left sided weakness to include his (L) UE and (R) LE  He went to bed that night around 2130 and 40 minutes later got up to use restroom. He noticed at that time his left upper and lower extremity was weak and  when he got back into bed that is when his wife noticed that he also had some left sided facial droop present as well. .  CT head: completed revealed No acute intracranial abnormality. CTA head and neck reveals: Atherosclerotic calcification origin dominant left vertebral artery with associated stenosis, chronic calcification carotid bifurcations, Abrupt occlusion distal right middle cerebral artery M3 branches corresponding to area of perfusion deficit. Patient was provided TPA in the Emergency room and his symptoms were improving;   Neuro IR was consulted and images reviewed by Dr. Maria De Jesus Hollis and found the  patient was not a candidate for embolectomy. He was then admitted to the Neuro ICU for further care. Neurology was consulted due to srtoke symptoms that were occurring. Past Medical History:  
Diagnosis Date  Arthritis  BMI 40.0-44.9, adult ([facility] [address] 03/20/2018  CAD (coronary artery disease)  Depression  Diabetes ([facility] [address]  GERD (gastroesophageal reflux disease)  Headache(784.0)  Hx of carbuncle of skin and subcutaneous tissue 03/20/2018  Hyperlipidemia  Hypertension  Morbid obesity ([facility] [address] 03/20/2018  Psychiatric disorder Depressioin, anxiety Past Surgical History:  
Procedure Laterality Date  HX GYN    
 c section  HX HEENT    
 tonsillectomy  HX ORTHOPAEDIC    
 lumbar spine surgery  HX ORTHOPAEDIC    
 bilat knee arthroscopy  HX ORTHOPAEDIC    
 cervical fusion  HX ORTHOPAEDIC    
 carpal tunnel surgery Family History Problem Relation Age of Onset  Cancer Maternal Aunt  Diabetes Brother  Heart Disease Maternal Grandmother Social History Tobacco Use  Smoking status: Never Smoker  Smokeless tobacco: Never Used Substance Use Topics  Alcohol use: No  
  
Current Facility-Administered Medications Medication Dose Route Frequency Provider Last Rate Last Dose  midazolam (VERSED) 1 mg/mL injection  propofol (DIPRIVAN) infusion  0-50 mcg/kg/min IntraVENous TITRATE Anastasia Bojorquez MD 32 mL/hr at 01/12/19 2130 50 mcg/kg/min at 01/12/19 2130  
 midazolam (VERSED) 1 mg/mL injection  LORazepam (ATIVAN) 1 mg/mL in D5W infusion  0-5 mg/hr IntraVENous TITRATE Anastasia Bojorquez MD 2.5 mL/hr at 01/12/19 1833 2.5 mg/hr at 01/12/19 1833  
 midazolam (VERSED) injection 5 mg  5 mg IntraVENous NOW Anastasia Bojorquez MD      
 acetaminophen (TYLENOL) suppository 650 mg  650 mg Rectal Q4H PRN Duane Huynh MD      
 0.9% sodium chloride infusion  100 mL/hr IntraVENous CONTINUOUS Duane Huynh  mL/hr at 01/12/19 1834 100 mL/hr at 01/12/19 1834  
 sodium chloride (NS) flush 5-40 mL  5-40 mL IntraVENous Q8H Duane Huynh MD   10 mL at 01/12/19 2144  sodium chloride (NS) flush 5-40 mL  5-40 mL IntraVENous PRN Duane Huynh MD      
 ondansetron (ZOFRAN) injection 4 mg  4 mg IntraVENous Q6H PRN Duane Huynh MD      
 [START ON 1/13/2019] aspirin (ASA) suppository 300 mg  300 mg Rectal DAILY Vidya Del Rio MD      
  bisacodyl (DULCOLAX) suppository 10 mg  10 mg Rectal DAILY PRN Duane Huynh MD      
 heparin (porcine) injection 5,000 Units  5,000 Units SubCUTAneous Q8H Duane Huynh MD   5,000 Units at 01/12/19 1921  
 insulin lispro (HUMALOG) injection   SubCUTAneous AC&HS Thanh ZUNIGA MD   4 Units at 01/12/19 2143  
 glucose chewable tablet 16 g  4 Tab Oral PRN Duane Huynh MD      
 dextrose (D50W) injection syrg 12.5-25 g  12.5-25 g IntraVENous PRN Duane Huynh MD      
 glucagon (GLUCAGEN) injection 1 mg  1 mg IntraMUSCular PRN Duane Huynh MD      
 pantoprazole (PROTONIX) 40 mg in sodium chloride 0.9% 10 mL injection  40 mg IntraVENous DAILY Duane Huynh MD   40 mg at 01/12/19 1928  potassium chloride 10 mEq in 50 ml IVPB  10 mEq IntraVENous Q1H Duane Huynh MD 50 mL/hr at 01/12/19 2228 10 mEq at 01/12/19 2228  niCARdipine (CARDENE) 25 mg in 0.9% sodium chloride 250 mL infusion  0-15 mg/hr IntraVENous TITRATE Duane Huynh MD 75 mL/hr at 01/12/19 2131 7.5 mg/hr at 01/12/19 2131  acetaminophen (OFIRMEV) infusion 1,000 mg  1,000 mg IntraVENous ONCE Ariela Jennings MD      
  
 
Allergies Allergen Reactions  Sulfa (Sulfonamide Antibiotics) Other (comments) Eyes burned after using sulfa eyedrops  Gabapentin Other (comments) Swelling in ankles  Oxycodone Unknown (comments) \"hallucinations\"  Tape [Adhesive] Rash Review of Systems: A comprehensive review of systems was negative except for that written in the History of Present Illness. Objective:  
 
Vitals:  
 01/12/19 2200 01/12/19 2205 01/12/19 2215 01/12/19 2230 BP: 111/76 127/57 134/60 137/55 Pulse: (!) 103 99 99 98 Resp: 11 14 23 19 Temp:    (!) 103.2 °F (39.6 °C) SpO2: 98% 98% 98% 99% Weight:      
Height:      
  
 
Physical Exam: 
 
CONSTITUTIONAL: Well-appearing; well-nourished; in NAD HEAD: Normocephalic; atraumatic EYES: PERRL; EOM intact; conjunctiva and sclera are clear bilaterally. ENT: No rhinorrhea; normal pharynx with no tonsillar hypertrophy; mucous membranes pink/moist, no erythema, no exudate. NECK: Supple; non-tender; no cervical lymphadenopathy CARD: Normal S1, S2; no murmurs, rubs, or gallops. Regular rate and rhythm. RESP: Normal respiratory effort; breath sounds clear and equal bilaterally; no wheezes, rhonchi, or rales. ABD: Normal bowel sounds; non-distended; non-tender; no palpable organomegaly, no masses, no bruits. Back Exam: Normal inspection; no vertebral point tenderness, no CVA tenderness. Normal range of motion. EXT: Normal ROM in all four extremities; non-tender to palpation; no swelling or deformity; distal pulses are normal, Edema noted in bilteral lower extremities. SKIN: Warm; dry; no rash. NEURO:Alert and oriented x 3, Imaging: CT head: 1/23/2019 IMPRESSION:  
No acute intracranial abnormality CTA head/neck PROCEDURE: 
During rapid bolus infusion 40 mL Isovue-370 CT perfusion acquisition through 
the mid calvarium was obtained with color coded mapping reconstructions of time 
to peak, blood volume, blood flow, mean transit time and  T-max . CT dose 
reduction was achieved through use of a standardized protocol tailored for this 
examination and automatic exposure control for dose modulation. COMPARISON: CT 
FINDINGS: 
There is an area of diminished perfusion in the right lateral frontal lobe 
operculum. Possible less defined area of reduced perfusion extending 
posteriorly ECHO: 
PROCEDURE: This was a routine study. The study included complete 2D 
imaging, M-mode, complete spectral Doppler, and color Doppler. The heart 
rate was 65 bpm, at the start of the study. Systolic blood pressure was 114 mmHg, at the start of the study. Diastolic blood pressure was 84 mmHg, 
at the start of the study. Intravenous contrast (Definity) was administered to opacify the left ventricle. This was a technically 
difficult study due to patient body habitus LEFT VENTRICLE: The ventricle was mildly to moderately dilated. Systolic 
function was markedly reduced. Ejection fraction was estimated in the 
range of 25 % to 30 %. There was severe diffuse hypokinesis with distinct 
regional wall motion abnormalities. RIGHT VENTRICLE: Not well visualized. LEFT ATRIUM: The atrium was moderately to severely dilated. ATRIAL SEPTUM: Not well visualized. RIGHT ATRIUM: Not well visualized. MITRAL VALVE: There was mild annular calcification. There was normal 
leaflet separation. AORTIC VALVE: The valve was trileaflet. Leaflets exhibited mild to 
moderate calcification. DOPPLER: There was mild stenosis. TRICUSPID VALVE: Not well visualized. DOPPLER: There was no evidence for 
tricuspid stenosis. There was trivial regurgitation. PULMONIC VALVE: Not well visualized. AORTA: The root exhibited normal size. PERICARDIUM: There was no pericardial effusion. Assessment:  
 
Hospital Problems  Date Reviewed: 1/12/2019 Codes Class Noted POA * (Principal) Acute CVA (cerebrovascular accident) ([facility] [address] ICD-10-CM: I63.9 ICD-9-CM: 434.91  1/12/2019 Yes Plan: - Abrupt occlusion distal right middle cerebral artery with successful/timely administration of TPA with rapid recovery  
- SBP < 140 
- HOB at 30 degrees - Elevated tropin w/o chest pain with possible cardiac cath on 1/24/2019 (cardiolgy consulted) Thank you for this consult and participating in the care of this patient. I have discussed the diagnosis with the patient and the intended plan as seen in the above orders. Patient is in agreement. Signed By: LES Lynch   
 January 12, 2019 13-Apr-2024

## 2024-04-13 NOTE — OB PROVIDER H&P - NS_PRENATALLABSOURCEGBS36PN_OBGYN_ALL_OB
What Type Of Note Output Would You Prefer (Optional)?: Standard Output Hpi Title: Evaluation of Skin Lesions How Severe Are Your Spot(S)?: mild Have Your Spot(S) Been Treated In The Past?: has not been treated negative

## 2024-04-13 NOTE — OB PROVIDER H&P - HISTORY OF PRESENT ILLNESS
HPI: Pt is a 38y   @39w1d presenting for IOL for macrosomia.  FM (+)  LOF (-)  CTX (-)  VB (-)  GBS neg  EFW: 4000g    OBHx: SAB x3 (one @24w)  GynHx: Denies uterine polyps, fibroids, ovarian cysts, no abnml paps or STI/STDs  PMHx: Denies  PSHx: Denies  Med: PNV  All: NKDA  Psych: Denies hx of mental health issues  SH: Denies hx of smoking, drinking, or drug usage during the pregnancy     HPI: Pt is a 38y   @39w1d presenting for eIOL. PNC c/b RSV @29w.  FM (+)  LOF (-)  CTX (+) irregular  VB (-)  GBS neg  EFW: 4000g    OBHx: SAB x3 (one @24w), D&C x1  GynHx: Denies uterine polyps, fibroids, ovarian cysts, no abnml paps or STI/STDs  PMHx: Childhood asthma  PSHx: D&C  Med: PNV, bASA  All: NKDA  Psych: Denies hx of mental health issues  SH: Denies hx of smoking, drinking, or drug usage during the pregnancy     HPI: Pt is a 38y   @39w1d presenting for eIOL. PNC c/b RSV @29w.  FM (+)  LOF (-)  CTX (+) irregular  VB (-)  GBS neg  EFW: 4000g    OBHx: SAB x3 ([place]24w [place]16[place] D&C x1  GynHx: Denies uterine polyps, fibroids, ovarian cysts, no abnml paps or STI/STDs  PMHx: Childhood asthma  PSHx: D&C  Med: PNV, bASA  All: NKDA  Psych: Denies hx of mental health issues  SH: Denies hx of smoking, drinking, or drug usage during the pregnancy     HPI: Pt is a 38y   @39w1d presenting for eIOL. PNC c/b RSV @29w.   FM (+)  LOF (-)  CTX (+) irregular  VB (-)  GBS neg  EFW: 4000g    OBHx: SAB x3 (one @24w vs 12w [place]16[place]), D&C x1. Within [facility] notes there is a discrepancy between gestational age of loss at 15 years of age.   GynHx: Denies uterine polyps, fibroids, ovarian cysts, no abnml paps or STI/STDs  PMHx: Childhood asthma  PSHx: D&C  Med: PNV, bASA  All: NKDA  Psych: Denies hx of mental health issues  SH: Denies hx of smoking, drinking, or drug usage during the pregnancy     HPI: Pt is a 38y   @39w1d presenting for eIOL. PNC c/b RSV @29w.   FM (+)  LOF (-)  CTX (+) irregular  VB (-)  GBS neg  EFW: 4000g    OBHx: SAB x3, D&C x1. Within [facility] notes there is a discrepancy between gestational age of loss at 15 years of age.   GynHx: Denies uterine polyps, fibroids, ovarian cysts, no abnml paps or STI/STDs  PMHx: Childhood asthma  PSHx: D&C  Med: PNV, bASA  All: NKDA  Psych: Denies hx of mental health issues  SH: Denies hx of smoking, drinking, or drug usage during the pregnancy

## 2024-04-13 NOTE — OB PROVIDER H&P - ASSESSMENT
A/P: Pt is a 38y  @39w1d who presents for IOL for macrosomia    1. Admit to L&D. Routine Labs. IVF  2. IOL  3. Fetus: Vertex, Reactive/CEFM  4. GBS neg  5. Pain: IV pain meds/epidural PRN     Discussed with     Teresa Hernandez MD, PGY-1  Obstetrics and Gynecology           A/P: Pt is a 38y  @39w1d who presents for eIOL    1. Admit to L&D. Routine Labs. IVF  2. IOL vaginal cytotec and cervical balloon  3. Fetus: Vertex, Reactive/CEFM  4. GBS neg  5. Pain: IV pain meds/epidural PRN     Discussed with Dr. Johny Hernandez MD, PGY-1  Obstetrics and Gynecology

## 2024-04-13 NOTE — OB RN PATIENT PROFILE - FALL HARM RISK - UNIVERSAL INTERVENTIONS
Bed in lowest position, wheels locked, appropriate side rails in place/Call bell, personal items and telephone in reach/Instruct patient to call for assistance before getting out of bed or chair/Non-slip footwear when patient is out of bed/[place] to call system/Purposeful Proactive Rounding/Room/bathroom lighting operational, light cord in reach

## 2024-04-13 NOTE — OB RN PATIENT PROFILE - NSICDXFAMILYHX_GEN_ALL_CORE_FT
Friend
FAMILY HISTORY:  Father  Still living? Unknown  Family history of kidney stone, Age at diagnosis: Age Unknown    Grandparent  Still living? Unknown  Family history of pacemaker, Age at diagnosis: Age Unknown

## 2024-04-13 NOTE — OB RN PATIENT PROFILE - HIV: DATE, OB PROFILE
Quality 128: Preventive Care And Screening: Body Mass Index (Bmi) Screening And Follow-Up Plan: BMI is documented within normal parameters and no follow-up plan is required. Detail Level: Detailed Quality 130: Documentation Of Current Medications In The Medical Record: Current Medications Documented Quality 431: Preventive Care And Screening: Unhealthy Alcohol Use - Screening: Patient screened for unhealthy alcohol use using a single question and scores less than 2 times per year Quality 402: Tobacco Use And Help With Quitting Among Adolescents: Patient screened for tobacco and never smoked 14-Apr-2024

## 2024-04-13 NOTE — OB PROVIDER H&P - NSSCHADMREASON_OBGYN_A_OB
What Type Of Note Output Would You Prefer (Optional)?: Bullet Format
What Is The Reason For Today's Visit?: Annual Full Body Skin Examination with No Concerns
Induction

## 2024-04-13 NOTE — OB PROVIDER H&P - PRETERM DELIVERIES, OB PROFILE
86yo male w. PMH HTN, HLD, PPM, CVA, Dysphagia, +PEG, sent in from [facility] with c/o left facial pain and swelling. Admitted w. +Left Parotitis, on Minocycline. +Thyroid nodule noted on CT. MELISSA on CKD in s/o dehydration- improving. 0 1

## 2024-04-14 LAB
HCT VFR BLD CALC: 37.7 % — SIGNIFICANT CHANGE UP (ref 34.5–45)
HGB BLD-MCNC: 10.8 G/DL — LOW (ref 11.5–15.5)
MCHC RBC-ENTMCNC: 25.6 PG — LOW (ref 27–34)
MCHC RBC-ENTMCNC: 28.6 GM/DL — LOW (ref 32–36)
MCV RBC AUTO: 89.3 FL — SIGNIFICANT CHANGE UP (ref 80–100)
NRBC # BLD: 0 /100 WBCS — SIGNIFICANT CHANGE UP (ref 0–0)
NRBC # FLD: 0.02 K/UL — HIGH (ref 0–0)
PLATELET # BLD AUTO: 146 K/UL — LOW (ref 150–400)
RBC # BLD: 4.22 M/UL — SIGNIFICANT CHANGE UP (ref 3.8–5.2)
RBC # FLD: 17.4 % — HIGH (ref 10.3–14.5)
T PALLIDUM AB TITR SER: NEGATIVE — SIGNIFICANT CHANGE UP
WBC # BLD: 11.55 K/UL — HIGH (ref 3.8–10.5)
WBC # FLD AUTO: 11.55 K/UL — HIGH (ref 3.8–10.5)

## 2024-04-14 PROCEDURE — 88307 TISSUE EXAM BY PATHOLOGIST: CPT | Mod: 26

## 2024-04-14 PROCEDURE — 12345F: CUSTOM | Mod: NC

## 2024-04-14 DEVICE — SURGICEL SNOW 2 X 4": Type: IMPLANTABLE DEVICE | Status: FUNCTIONAL

## 2024-04-14 RX ORDER — SODIUM CHLORIDE 9 MG/ML
1000 INJECTION, SOLUTION INTRAVENOUS ONCE
Refills: 0 | Status: COMPLETED | OUTPATIENT
Start: 2024-04-14 | End: 2024-04-14

## 2024-04-14 RX ORDER — PIPERACILLIN AND TAZOBACTAM 4; .5 G/20ML; G/20ML
4.5 INJECTION, POWDER, LYOPHILIZED, FOR SOLUTION INTRAVENOUS ONCE
Refills: 0 | Status: COMPLETED | OUTPATIENT
Start: 2024-04-14 | End: 2024-04-14

## 2024-04-14 RX ORDER — KETOROLAC TROMETHAMINE 30 MG/ML
30 SYRINGE (ML) INJECTION EVERY 6 HOURS
Refills: 0 | Status: DISCONTINUED | OUTPATIENT
Start: 2024-04-14 | End: 2024-04-16

## 2024-04-14 RX ORDER — ACETAMINOPHEN 500 MG
1000 TABLET ORAL ONCE
Refills: 0 | Status: COMPLETED | OUTPATIENT
Start: 2024-04-14 | End: 2024-04-14

## 2024-04-14 RX ORDER — ACETAMINOPHEN 500 MG
975 TABLET ORAL
Refills: 0 | Status: DISCONTINUED | OUTPATIENT
Start: 2024-04-14 | End: 2024-04-18

## 2024-04-14 RX ORDER — IBUPROFEN 200 MG
600 TABLET ORAL EVERY 6 HOURS
Refills: 0 | Status: COMPLETED | OUTPATIENT
Start: 2024-04-14 | End: 2025-03-13

## 2024-04-14 RX ORDER — DIPHENHYDRAMINE HCL 50 MG
25 CAPSULE ORAL EVERY 6 HOURS
Refills: 0 | Status: DISCONTINUED | OUTPATIENT
Start: 2024-04-14 | End: 2024-04-18

## 2024-04-14 RX ORDER — LANOLIN
1 OINTMENT (GRAM) TOPICAL EVERY 6 HOURS
Refills: 0 | Status: DISCONTINUED | OUTPATIENT
Start: 2024-04-14 | End: 2024-04-18

## 2024-04-14 RX ORDER — SODIUM CHLORIDE 9 MG/ML
1000 INJECTION INTRAMUSCULAR; INTRAVENOUS; SUBCUTANEOUS
Refills: 0 | Status: DISCONTINUED | OUTPATIENT
Start: 2024-04-14 | End: 2024-04-14

## 2024-04-14 RX ORDER — TETANUS TOXOID, REDUCED DIPHTHERIA TOXOID AND ACELLULAR PERTUSSIS VACCINE, ADSORBED 5; 2.5; 8; 8; 2.5 [IU]/.5ML; [IU]/.5ML; UG/.5ML; UG/.5ML; UG/.5ML
0.5 SUSPENSION INTRAMUSCULAR ONCE
Refills: 0 | Status: DISCONTINUED | OUTPATIENT
Start: 2024-04-14 | End: 2024-04-18

## 2024-04-14 RX ORDER — OXYCODONE HYDROCHLORIDE 5 MG/1
5 TABLET ORAL
Refills: 0 | Status: COMPLETED | OUTPATIENT
Start: 2024-04-14 | End: 2024-04-21

## 2024-04-14 RX ORDER — MAGNESIUM HYDROXIDE 400 MG/1
30 TABLET, CHEWABLE ORAL
Refills: 0 | Status: DISCONTINUED | OUTPATIENT
Start: 2024-04-14 | End: 2024-04-18

## 2024-04-14 RX ORDER — HEPARIN SODIUM 5000 [USP'U]/ML
10000 INJECTION INTRAVENOUS; SUBCUTANEOUS EVERY 12 HOURS
Refills: 0 | Status: DISCONTINUED | OUTPATIENT
Start: 2024-04-14 | End: 2024-04-15

## 2024-04-14 RX ORDER — PIPERACILLIN AND TAZOBACTAM 4; .5 G/20ML; G/20ML
4.5 INJECTION, POWDER, LYOPHILIZED, FOR SOLUTION INTRAVENOUS ONCE
Refills: 0 | Status: DISCONTINUED | OUTPATIENT
Start: 2024-04-14 | End: 2024-04-14

## 2024-04-14 RX ORDER — PIPERACILLIN AND TAZOBACTAM 4; .5 G/20ML; G/20ML
4.5 INJECTION, POWDER, LYOPHILIZED, FOR SOLUTION INTRAVENOUS EVERY 8 HOURS
Refills: 0 | Status: DISCONTINUED | OUTPATIENT
Start: 2024-04-14 | End: 2024-04-16

## 2024-04-14 RX ORDER — SODIUM CHLORIDE 9 MG/ML
500 INJECTION INTRAMUSCULAR; INTRAVENOUS; SUBCUTANEOUS ONCE
Refills: 0 | Status: DISCONTINUED | OUTPATIENT
Start: 2024-04-14 | End: 2024-04-14

## 2024-04-14 RX ORDER — OXYCODONE HYDROCHLORIDE 5 MG/1
5 TABLET ORAL ONCE
Refills: 0 | Status: DISCONTINUED | OUTPATIENT
Start: 2024-04-14 | End: 2024-04-18

## 2024-04-14 RX ORDER — OXYTOCIN 10 UNIT/ML
VIAL (ML) INJECTION
Qty: 30 | Refills: 0 | Status: DISCONTINUED | OUTPATIENT
Start: 2024-04-14 | End: 2024-04-14

## 2024-04-14 RX ORDER — OXYTOCIN 10 UNIT/ML
333.33 VIAL (ML) INJECTION
Qty: 20 | Refills: 0 | Status: DISCONTINUED | OUTPATIENT
Start: 2024-04-14 | End: 2024-04-15

## 2024-04-14 RX ORDER — SIMETHICONE 80 MG/1
80 TABLET, CHEWABLE ORAL EVERY 4 HOURS
Refills: 0 | Status: DISCONTINUED | OUTPATIENT
Start: 2024-04-14 | End: 2024-04-18

## 2024-04-14 RX ADMIN — Medication 2 MILLIUNIT(S)/MIN: at 07:00

## 2024-04-14 RX ADMIN — Medication 400 MILLIGRAM(S): at 17:30

## 2024-04-14 RX ADMIN — Medication 1000 MILLIUNIT(S)/MIN: at 22:30

## 2024-04-14 RX ADMIN — SODIUM CHLORIDE 125 MILLILITER(S): 9 INJECTION INTRAMUSCULAR; INTRAVENOUS; SUBCUTANEOUS at 17:50

## 2024-04-14 RX ADMIN — Medication 1000 MILLIGRAM(S): at 18:00

## 2024-04-14 RX ADMIN — Medication 1000 MILLIGRAM(S): at 13:01

## 2024-04-14 RX ADMIN — PIPERACILLIN AND TAZOBACTAM 200 GRAM(S): 4; .5 INJECTION, POWDER, LYOPHILIZED, FOR SOLUTION INTRAVENOUS at 12:54

## 2024-04-14 RX ADMIN — SODIUM CHLORIDE 1000 MILLILITER(S): 9 INJECTION, SOLUTION INTRAVENOUS at 12:53

## 2024-04-14 RX ADMIN — Medication 400 MILLIGRAM(S): at 23:30

## 2024-04-14 RX ADMIN — CHLORHEXIDINE GLUCONATE 1 APPLICATION(S): 213 SOLUTION TOPICAL at 19:19

## 2024-04-14 RX ADMIN — SODIUM CHLORIDE 2000 MILLILITER(S): 9 INJECTION, SOLUTION INTRAVENOUS at 17:30

## 2024-04-14 RX ADMIN — CHLORHEXIDINE GLUCONATE 1 APPLICATION(S): 213 SOLUTION TOPICAL at 19:03

## 2024-04-14 RX ADMIN — SODIUM CHLORIDE 125 MILLILITER(S): 9 INJECTION, SOLUTION INTRAVENOUS at 17:41

## 2024-04-14 RX ADMIN — Medication 400 MILLIGRAM(S): at 12:49

## 2024-04-14 NOTE — OB RN INTRAOPERATIVE NOTE - NSSELHIDDEN_OBGYN_ALL_OB_FT
[NS_DeliveryAttending1_OBGYN_ALL_OB_FT:[id number]],[NS_DeliveryAssist2_OBGYN_ALL_OB_FT:[id number]],[NS_DeliveryRN_OBGYN_ALL_OB_FT:[id number]]

## 2024-04-14 NOTE — OB NEONATOLOGY/PEDIATRICIAN DELIVERY SUMMARY - NSPEDSNEONOTESA_OBGYN_ALL_OB_FT
Baby is a 39.2 wk LGA female born to a 39 y/o  mother via C/S. PEDS called to delivery for maternal chorioamnionitis, category II FHR tracing, heavy meconium. Maternal OB history SABx2, one with D&C, chemical pregnancy x 1. Prenatal history induction of labor for macrosomia. Maternal blood type O+. PNL negative, non-reactive, and immune. GBS negative on 3/28. AROM at 11:13 on , heavy mec fluids. Baby born vigorous and crying spontaneously. Warmed, dried, stimulated. Apgars 8/9. EOS 0.36. Mom plans to breastfeed and bottlefeed and consents hepB. Highest maternal temp: 38.4.   BW: 3770  :   TOB: 20:37    Physical Exam:  Gen: NAD, +grimace  HEENT: anterior fontanel open soft and flat, no cleft lip/palate, ears normal set, no ear pits or tags, nares clinically patent  Resp: no increased work of breathing, good air entry b/l  Cardio: Normal S1/S2, regular rate and rhythm  Abd: soft, non tender, non distended, umbilical cord with 3 vessels  Neuro: +grasp/kwame, normal tone  Extremities: moving all extremities, full range of motion x 4  Skin: pink, warm  Genitals: Lorenzo 1, anus patent Baby is a 39.2 wk LGA female born to a 39 y/o  mother via C/S. PEDS called to delivery for maternal chorioamnionitis, category II FHR tracing, heavy meconium. Maternal OB history SABx2, one with D&C, chemical pregnancy x 1. Prenatal history induction of labor for macrosomia. Maternal blood type O+. PNL negative, non-reactive, and immune. GBS negative on 3/28. AROM at 11:13 on , heavy mec fluids. Baby born vigorous and crying spontaneously. Warmed, dried, stimulated. Apgars 8/9. EOS 0.36. Mom plans to breastfeed and bottlefeed and consents hepB. Highest maternal temp: 38.4. Received zosyn prior to delivery.   BW: 3770  :   TOB: 20:37    Physical Exam:  Gen: NAD, +grimace  HEENT: anterior fontanel open soft and flat, no cleft lip/palate, ears normal set, no ear pits or tags, nares clinically patent  Resp: no increased work of breathing, good air entry b/l  Cardio: Normal S1/S2, regular rate and rhythm  Abd: soft, non tender, non distended, umbilical cord with 3 vessels  Neuro: +grasp/kwame, normal tone  Extremities: moving all extremities, full range of motion x 4  Skin: pink, warm  Genitals: Lorenzo 1, anus patent Baby is a 39.2 wk LGA female born to a 39 y/o  mother via C/S. PEDS called to delivery for maternal chorioamnionitis, category II FHR tracing, heavy meconium. Maternal OB history SABx2, one with D&C, chemical pregnancy x 1. Induction of labor for macrosomia. Maternal blood type O+. PNL negative, non-reactive, and immune. GBS negative on 3/28. AROM at 11:13 on , heavy mec fluids. Baby born vigorous and crying spontaneously. Warmed, dried, stimulated. Apgars 8/9. At 9:30 min of life, needed intermittent CPAP 5/21-30% for approx 30 min. EOS 0.36. Mom plans to breastfeed and bottlefeed and consents hepB. Highest maternal temp: 38.4. Received zosyn prior to delivery.   BW: 3770  :   TOB: 20:37    Physical Exam:  Gen: NAD, +grimace  HEENT: anterior fontanel open soft and flat, no cleft lip/palate, ears normal set, no ear pits or tags, nares clinically patent  Resp: no increased work of breathing, good air entry b/l  Cardio: Normal S1/S2, regular rate and rhythm  Abd: soft, non tender, non distended, umbilical cord with 3 vessels  Neuro: +grasp/kwame, normal tone  Extremities: moving all extremities, full range of motion x 4  Skin: pink, warm  Genitals: Lorenzo 1, anus patent

## 2024-04-14 NOTE — OB RN DELIVERY SUMMARY - NSSELHIDDEN_OBGYN_ALL_OB_FT
[NS_DeliveryAttending1_OBGYN_ALL_OB_FT:[id number]],[NS_DeliveryAssist2_OBGYN_ALL_OB_FT:[id number]],[NS_DeliveryRN_OBGYN_ALL_OB_FT:[id number]=]

## 2024-04-14 NOTE — OB PROVIDER LABOR PROGRESS NOTE - NSVAGINALEXAM_OBGYN_ALL_OB_DT
14-Apr-2024 05:58
14-Apr-2024 11:00
14-Apr-2024 17:50
14-Apr-2024 11:29
13-Apr-2024 18:36
14-Apr-2024 11:00
14-Apr-2024 16:13

## 2024-04-14 NOTE — OB PROVIDER DELIVERY SUMMARY - NSSELHIDDEN_OBGYN_ALL_OB_FT
[NS_DeliveryAttending1_OBGYN_ALL_OB_FT:[id number]],[NS_DeliveryAssist2_OBGYN_ALL_OB_FT:[id number]],[NS_DeliveryRN_OBGYN_ALL_OB_FT:[id number]]

## 2024-04-14 NOTE — OB PROVIDER LABOR PROGRESS NOTE - NS_OBIHIFHRDETAILS_OBGYN_ALL_OB_FT
Baseline 150  Moderate variability.- Accels + Late Decels
Baseline 130, mod variability, + accels, - decels
Baseline 150, minimal to moderate variability, +accels, - decels
145/min variability/no accels/no decels    cat 2
145/min variability/no accels/no decels     cat 2

## 2024-04-14 NOTE — CHART NOTE - NSCHARTNOTEFT_GEN_A_CORE
Pt now meeting criteria for chorioamnionitis.     Maternal HR: 117  Maternal temp: 38.0.  Patient reports subjective chills  FHR: 165 (Previous FHR of 150's)      Patient has been on pitocin since 7a . AROM of heavy mec at 11:15   Continue pursuit  at this time  DW: OB saftetemo officers     Cynthia Boyle, PGY-3

## 2024-04-14 NOTE — OB PROVIDER DELIVERY SUMMARY - NSMODPPHRISK_OBGYN_A_OB
Over-distended uterus: Multiple gestation, polyhydramnios, Macrosomia/Chorioamnionitis/BMI > 40/AMA - over 35 years of age

## 2024-04-14 NOTE — OB PROVIDER LABOR PROGRESS NOTE - NS_SUBJECTIVE/OBJECTIVE_OBGYN_ALL_OB_FT
pt seen and examined at bedside for continuation of care
Patient seen and evaluated at the bedside after nurse called to inform that patient's cervical balloon had fallen out. Patient comfortable without acute complaints.     Vital Signs Last 24 Hrs  T(C): 37.2 (14 Apr 2024 04:57), Max: 37.2 (14 Apr 2024 04:57)  T(F): 98.96 (14 Apr 2024 04:57), Max: 98.96 (14 Apr 2024 04:57)  HR: 102 (14 Apr 2024 06:21) (84 - 121)  BP: 110/55 (14 Apr 2024 06:10) (93/53 - 147/81)  BP(mean): --  RR: 15 (14 Apr 2024 04:57) (13 - 17)  SpO2: 94% (14 Apr 2024 06:21) (89% - 98%)    Parameters below as of 13 Apr 2024 16:37  Patient On (Oxygen Delivery Method): room air
Pt seen for intermittent late decelerations.   Labor course complicated by chorioamnionitis and heavy meconium.   Pt feels well - comfortable with epidural
Pt examined for cervical balloon and vaginal cytotec placement. Balloon placed in usual fashion.
pt seen and examined at bedside for continuation of care
R4 Labor Note    S: Patient evaluated at bedside for cervical change.     O:  T(C): 38.1 (04-14-24 @ 17:22), Max: 38.1 (04-14-24 @ 17:22)  HR: 122 (04-14-24 @ 17:44) (84 - 126)  BP: 123/63 (04-14-24 @ 17:44) (84/52 - 147/81)  RR: 19 (04-14-24 @ 17:22) (13 - 20)  SpO2: 98% (04-14-24 @ 17:49) (89% - 100%)
R4 Labor Note    S: Patient evaluated at bedside for cervical change.     O:  T(C): 36.7 (04-14-24 @ 10:17), Max: 37.2 (04-14-24 @ 04:57)  HR: 108 (04-14-24 @ 11:24) (84 - 121)  BP: 113/64 (04-14-24 @ 11:14) (93/53 - 147/81)  RR: 17 (04-14-24 @ 10:17) (13 - 17)  SpO2: 99% (04-14-24 @ 11:24) (89% - 99%)

## 2024-04-14 NOTE — OB PROVIDER LABOR PROGRESS NOTE - ASSESSMENT
Pt admitted for eIOL with cervical balloon in place    - Continue toco, EFM, and IVF  - Continue IOL with cervical balloon and vaginal cytotec    Teresa Hernandez PGY1
37 yo  at 39/2 weeks eIOL  - minimal cervical change noted. plan for possible AROM   - peanut ball   - 500 cc bolus   - cont to monitor EFM/toco    d/w Dr Suarez service attending     greyson LESLIE
39yo  @39w2d eIOL. Patient stable and progressing well.     Plan  - s/p CB  - Transition to pitocin    d/w Dr. Flory Marrero PGY4
A/P 38y P0 @ 39/2 wks IOL  -Pitocin turned off   -Patient placed in spiderman position   -AI started  -Analgesia: top off called for   -Discussed need for  delivery if resuscitative efforts do not change tracing , will reasses in 30 min    d/w Dr. Jacobs and Dr. Suarez during PTA   Danii Padilla PGY-4     Addendum     Patient noted to be febrile again to be given IV tylenol and fluids at this time     Danii Padilla PGY-4 
39 yo  at 39/2 weeks eIOL  - minimal cervical change noted. plan for possible AROM  - peanut ball  - 500cc LR bolus   - cont to monitor EFM/toco    d/w DR Suarez service attending     greyson LESLIE
A/P 38y P0 @ 38 wks IOL  -IOL: Continue Pitocin, increase as tracing allows  -IUPC placed    -AROM ( [facility])   -Cat 1 tracing  -Analgesia : Epidural in place and effective   -Anticipate     Exam and AROM performed by Dr. Daniela Padilla PGY-4  
AROM of forebag with good application of fetal head. Heavy meconium again noted.    ISE placed for better assessment of FHR with frequent positioning changes   Placed in left lateral on peanut ball   Continue Zosyn. Trend fever curve.   Continue Pitocin Currently at 28mu. IUPC in place - titrate to adequate.   Epidural in place     Dr. Jacobs present at bedside   Cynthia Boyle, PGY-3

## 2024-04-14 NOTE — CHART NOTE - NSCHARTNOTEFT_GEN_A_CORE
R4 Chart Note     Delayed entry secondary to patient     Patient was an elective induction for macrosomia with a category 2 tracing ( minimal to moderate variability with variables and late decelerations) despite intervention and minimal change on cervical exam. Patient meeting criteria for  delivery at this time       Anesthesia and Charge aware   Consents signed at bedside with risk benefits and alternatives discussed     Danii Padilla, PGY4 R4 Chart Note     Delayed entry secondary to patient care    Patient was an elective induction for macrosomia with a category 2 tracing ( minimal to moderate variability with variables and late decelerations) despite intervention and minimal change on cervical exam. Patient meeting criteria for  delivery at this time       Anesthesia and Charge aware   Consents signed at bedside with risk benefits and alternatives discussed     Danii Padilla, PGY4    OB Attending  Pt seen with Dr. Padilla and reviewed risks and benefits with patient  All questions answered and pt verbalized understanding  WILIAM Suarez MD

## 2024-04-14 NOTE — OB RN DELIVERY SUMMARY - NS_SEPSISRSKCALC_OBGYN_ALL_OB_FT
EOS calculated successfully. EOS Risk Factor: 0.5/1000 live births ([facility] national incidence); GA=39w2d; Temp=101.12; ROM=9.4; GBS='Negative'; Antibiotics='Broad spectrum antibiotics > 4 hrs prior to birth'

## 2024-04-14 NOTE — OB PROVIDER DELIVERY SUMMARY - NSPROVIDERDELIVERYNOTE_OBGYN_ALL_OB_FT
Procedure: pLTCS  Preop Dx: Cat 2 FHT, chorioamnionitis  QBL:1891cc  IVF:  1300L crystalloids + 1u prbcs  UOP: 350ml  Layers of uterine closure: one  Complications: PPH 2/2 multiple large sinuses  Specimen: placenta  Findings: large uterine sinuses noted upon hysterotomy, otherwise grossly normal uterus & BL fallopian tubes. BL polycystic appearing ovaries.  Hemostatic/intraoperative agents: extra pitocin, TXA, methergine, surgicel powder  Baby: F infant, vtx    Jay Jay Willis, PGY4

## 2024-04-14 NOTE — OB PROVIDER LABOR PROGRESS NOTE - NS_OBIHICONTRACTIONPATTERNDETAILS_OBGYN_ALL_OB_FT
Please review and sign encounter, thank you 
q 2-3 minutes
toco q irr
q4 - more regular. IUPC
q 1-4 mins
q 1-4 mins

## 2024-04-15 LAB
ADD ON TEST-SPECIMEN IN LAB: SIGNIFICANT CHANGE UP
ALBUMIN SERPL ELPH-MCNC: 2.3 G/DL — LOW (ref 3.3–5)
ALBUMIN SERPL ELPH-MCNC: 2.3 G/DL — LOW (ref 3.3–5)
ALP SERPL-CCNC: 143 U/L — HIGH (ref 40–120)
ALP SERPL-CCNC: 160 U/L — HIGH (ref 40–120)
ALT FLD-CCNC: 11 U/L — SIGNIFICANT CHANGE UP (ref 4–33)
ALT FLD-CCNC: 8 U/L — SIGNIFICANT CHANGE UP (ref 4–33)
ANION GAP SERPL CALC-SCNC: 12 MMOL/L — SIGNIFICANT CHANGE UP (ref 7–14)
ANION GAP SERPL CALC-SCNC: 13 MMOL/L — SIGNIFICANT CHANGE UP (ref 7–14)
ANION GAP SERPL CALC-SCNC: 14 MMOL/L — SIGNIFICANT CHANGE UP (ref 7–14)
ANISOCYTOSIS BLD QL: SLIGHT — SIGNIFICANT CHANGE UP
APTT BLD: 23.2 SEC — LOW (ref 24.5–35.6)
APTT BLD: 27.7 SEC — SIGNIFICANT CHANGE UP (ref 24.5–35.6)
APTT BLD: >200 SEC — CRITICAL HIGH (ref 24.5–35.6)
AST SERPL-CCNC: 31 U/L — SIGNIFICANT CHANGE UP (ref 4–32)
AST SERPL-CCNC: 34 U/L — HIGH (ref 4–32)
BASOPHILS # BLD AUTO: 0 K/UL — SIGNIFICANT CHANGE UP (ref 0–0.2)
BASOPHILS # BLD AUTO: 0.02 K/UL — SIGNIFICANT CHANGE UP (ref 0–0.2)
BASOPHILS # BLD AUTO: 0.03 K/UL — SIGNIFICANT CHANGE UP (ref 0–0.2)
BASOPHILS NFR BLD AUTO: 0 % — SIGNIFICANT CHANGE UP (ref 0–2)
BASOPHILS NFR BLD AUTO: 0.2 % — SIGNIFICANT CHANGE UP (ref 0–2)
BASOPHILS NFR BLD AUTO: 0.3 % — SIGNIFICANT CHANGE UP (ref 0–2)
BILIRUB SERPL-MCNC: 0.6 MG/DL — SIGNIFICANT CHANGE UP (ref 0.2–1.2)
BILIRUB SERPL-MCNC: 1 MG/DL — SIGNIFICANT CHANGE UP (ref 0.2–1.2)
BUN SERPL-MCNC: 6 MG/DL — LOW (ref 7–23)
BUN SERPL-MCNC: 7 MG/DL — SIGNIFICANT CHANGE UP (ref 7–23)
BUN SERPL-MCNC: 9 MG/DL — SIGNIFICANT CHANGE UP (ref 7–23)
CALCIUM SERPL-MCNC: 7.6 MG/DL — LOW (ref 8.4–10.5)
CALCIUM SERPL-MCNC: 7.9 MG/DL — LOW (ref 8.4–10.5)
CALCIUM SERPL-MCNC: 8 MG/DL — LOW (ref 8.4–10.5)
CHLORIDE SERPL-SCNC: 105 MMOL/L — SIGNIFICANT CHANGE UP (ref 98–107)
CHLORIDE SERPL-SCNC: 106 MMOL/L — SIGNIFICANT CHANGE UP (ref 98–107)
CHLORIDE SERPL-SCNC: 107 MMOL/L — SIGNIFICANT CHANGE UP (ref 98–107)
CO2 SERPL-SCNC: 16 MMOL/L — LOW (ref 22–31)
CO2 SERPL-SCNC: 17 MMOL/L — LOW (ref 22–31)
CO2 SERPL-SCNC: 18 MMOL/L — LOW (ref 22–31)
CREAT SERPL-MCNC: 0.49 MG/DL — LOW (ref 0.5–1.3)
CREAT SERPL-MCNC: 0.62 MG/DL — SIGNIFICANT CHANGE UP (ref 0.5–1.3)
CREAT SERPL-MCNC: 0.73 MG/DL — SIGNIFICANT CHANGE UP (ref 0.5–1.3)
EGFR: 108 ML/MIN/1.73M2 — SIGNIFICANT CHANGE UP
EGFR: 117 ML/MIN/1.73M2 — SIGNIFICANT CHANGE UP
EGFR: 124 ML/MIN/1.73M2 — SIGNIFICANT CHANGE UP
EOSINOPHIL # BLD AUTO: 0 K/UL — SIGNIFICANT CHANGE UP (ref 0–0.5)
EOSINOPHIL # BLD AUTO: 0 K/UL — SIGNIFICANT CHANGE UP (ref 0–0.5)
EOSINOPHIL # BLD AUTO: 0.09 K/UL — SIGNIFICANT CHANGE UP (ref 0–0.5)
EOSINOPHIL NFR BLD AUTO: 0 % — SIGNIFICANT CHANGE UP (ref 0–6)
EOSINOPHIL NFR BLD AUTO: 0 % — SIGNIFICANT CHANGE UP (ref 0–6)
EOSINOPHIL NFR BLD AUTO: 0.9 % — SIGNIFICANT CHANGE UP (ref 0–6)
FIBRINOGEN PPP-MCNC: 409 MG/DL — SIGNIFICANT CHANGE UP (ref 200–465)
GAS PNL BLDA: SIGNIFICANT CHANGE UP
GIANT PLATELETS BLD QL SMEAR: PRESENT — SIGNIFICANT CHANGE UP
GLUCOSE SERPL-MCNC: 107 MG/DL — HIGH (ref 70–99)
GLUCOSE SERPL-MCNC: 130 MG/DL — HIGH (ref 70–99)
GLUCOSE SERPL-MCNC: 95 MG/DL — SIGNIFICANT CHANGE UP (ref 70–99)
HCT VFR BLD CALC: 26.8 % — LOW (ref 34.5–45)
HCT VFR BLD CALC: 27.4 % — LOW (ref 34.5–45)
HCT VFR BLD CALC: 28.6 % — LOW (ref 34.5–45)
HCT VFR BLD CALC: 32.2 % — LOW (ref 34.5–45)
HGB BLD-MCNC: 10.1 G/DL — LOW (ref 11.5–15.5)
HGB BLD-MCNC: 8.5 G/DL — LOW (ref 11.5–15.5)
HGB BLD-MCNC: 8.8 G/DL — LOW (ref 11.5–15.5)
HGB BLD-MCNC: 8.9 G/DL — LOW (ref 11.5–15.5)
HYPOCHROMIA BLD QL: SLIGHT — SIGNIFICANT CHANGE UP
IANC: 10.66 K/UL — HIGH (ref 1.8–7.4)
IANC: 8.6 K/UL — HIGH (ref 1.8–7.4)
IANC: 9.48 K/UL — HIGH (ref 1.8–7.4)
IMM GRANULOCYTES NFR BLD AUTO: 0.4 % — SIGNIFICANT CHANGE UP (ref 0–0.9)
IMM GRANULOCYTES NFR BLD AUTO: 0.7 % — SIGNIFICANT CHANGE UP (ref 0–0.9)
INR BLD: 1.09 RATIO — SIGNIFICANT CHANGE UP (ref 0.85–1.18)
INR BLD: 1.13 RATIO — SIGNIFICANT CHANGE UP (ref 0.85–1.18)
INR BLD: 1.16 RATIO — SIGNIFICANT CHANGE UP (ref 0.85–1.18)
LACTATE SERPL-SCNC: 1 MMOL/L — SIGNIFICANT CHANGE UP (ref 0.5–2)
LACTATE SERPL-SCNC: 2.6 MMOL/L — HIGH (ref 0.5–2)
LACTATE SERPL-SCNC: 2.9 MMOL/L — HIGH (ref 0.5–2)
LYMPHOCYTES # BLD AUTO: 0.31 K/UL — LOW (ref 1–3.3)
LYMPHOCYTES # BLD AUTO: 0.48 K/UL — LOW (ref 1–3.3)
LYMPHOCYTES # BLD AUTO: 0.6 K/UL — LOW (ref 1–3.3)
LYMPHOCYTES # BLD AUTO: 2.7 % — LOW (ref 13–44)
LYMPHOCYTES # BLD AUTO: 4.5 % — LOW (ref 13–44)
LYMPHOCYTES # BLD AUTO: 6.2 % — LOW (ref 13–44)
MCHC RBC-ENTMCNC: 25 PG — LOW (ref 27–34)
MCHC RBC-ENTMCNC: 25.3 PG — LOW (ref 27–34)
MCHC RBC-ENTMCNC: 25.7 PG — LOW (ref 27–34)
MCHC RBC-ENTMCNC: 26.2 PG — LOW (ref 27–34)
MCHC RBC-ENTMCNC: 30.8 GM/DL — LOW (ref 32–36)
MCHC RBC-ENTMCNC: 31.4 GM/DL — LOW (ref 32–36)
MCHC RBC-ENTMCNC: 31.7 GM/DL — LOW (ref 32–36)
MCHC RBC-ENTMCNC: 32.5 GM/DL — SIGNIFICANT CHANGE UP (ref 32–36)
MCV RBC AUTO: 80.5 FL — SIGNIFICANT CHANGE UP (ref 80–100)
MCV RBC AUTO: 80.6 FL — SIGNIFICANT CHANGE UP (ref 80–100)
MCV RBC AUTO: 81 FL — SIGNIFICANT CHANGE UP (ref 80–100)
MCV RBC AUTO: 81.3 FL — SIGNIFICANT CHANGE UP (ref 80–100)
MICROCYTES BLD QL: SLIGHT — SIGNIFICANT CHANGE UP
MONOCYTES # BLD AUTO: 0.29 K/UL — SIGNIFICANT CHANGE UP (ref 0–0.9)
MONOCYTES # BLD AUTO: 0.44 K/UL — SIGNIFICANT CHANGE UP (ref 0–0.9)
MONOCYTES # BLD AUTO: 0.71 K/UL — SIGNIFICANT CHANGE UP (ref 0–0.9)
MONOCYTES NFR BLD AUTO: 2.6 % — SIGNIFICANT CHANGE UP (ref 2–14)
MONOCYTES NFR BLD AUTO: 4.5 % — SIGNIFICANT CHANGE UP (ref 2–14)
MONOCYTES NFR BLD AUTO: 6.6 % — SIGNIFICANT CHANGE UP (ref 2–14)
NEUTROPHILS # BLD AUTO: 10.66 K/UL — HIGH (ref 1.8–7.4)
NEUTROPHILS # BLD AUTO: 8.57 K/UL — HIGH (ref 1.8–7.4)
NEUTROPHILS # BLD AUTO: 9.48 K/UL — HIGH (ref 1.8–7.4)
NEUTROPHILS NFR BLD AUTO: 87.5 % — HIGH (ref 43–77)
NEUTROPHILS NFR BLD AUTO: 88 % — HIGH (ref 43–77)
NEUTROPHILS NFR BLD AUTO: 94 % — HIGH (ref 43–77)
NEUTS BAND # BLD: 0.9 % — SIGNIFICANT CHANGE UP (ref 0–6)
NRBC # BLD: 0 /100 WBCS — SIGNIFICANT CHANGE UP (ref 0–0)
NRBC # BLD: 1 /100 WBCS — HIGH (ref 0–0)
NRBC # FLD: 0 K/UL — SIGNIFICANT CHANGE UP (ref 0–0)
PLAT MORPH BLD: ABNORMAL
PLATELET # BLD AUTO: 189 K/UL — SIGNIFICANT CHANGE UP (ref 150–400)
PLATELET # BLD AUTO: 193 K/UL — SIGNIFICANT CHANGE UP (ref 150–400)
PLATELET # BLD AUTO: 220 K/UL — SIGNIFICANT CHANGE UP (ref 150–400)
PLATELET # BLD AUTO: 233 K/UL — SIGNIFICANT CHANGE UP (ref 150–400)
PLATELET COUNT - ESTIMATE: NORMAL — SIGNIFICANT CHANGE UP
POLYCHROMASIA BLD QL SMEAR: SIGNIFICANT CHANGE UP
POTASSIUM SERPL-MCNC: 3.7 MMOL/L — SIGNIFICANT CHANGE UP (ref 3.5–5.3)
POTASSIUM SERPL-MCNC: 3.8 MMOL/L — SIGNIFICANT CHANGE UP (ref 3.5–5.3)
POTASSIUM SERPL-MCNC: 4.3 MMOL/L — SIGNIFICANT CHANGE UP (ref 3.5–5.3)
POTASSIUM SERPL-SCNC: 3.7 MMOL/L — SIGNIFICANT CHANGE UP (ref 3.5–5.3)
POTASSIUM SERPL-SCNC: 3.8 MMOL/L — SIGNIFICANT CHANGE UP (ref 3.5–5.3)
POTASSIUM SERPL-SCNC: 4.3 MMOL/L — SIGNIFICANT CHANGE UP (ref 3.5–5.3)
PROT SERPL-MCNC: 4.1 G/DL — LOW (ref 6–8.3)
PROT SERPL-MCNC: 4.7 G/DL — LOW (ref 6–8.3)
PROTHROM AB SERPL-ACNC: 12.2 SEC — SIGNIFICANT CHANGE UP (ref 9.5–13)
PROTHROM AB SERPL-ACNC: 12.6 SEC — SIGNIFICANT CHANGE UP (ref 9.5–13)
PROTHROM AB SERPL-ACNC: 12.9 SEC — SIGNIFICANT CHANGE UP (ref 9.5–13)
RBC # BLD: 3.31 M/UL — LOW (ref 3.8–5.2)
RBC # BLD: 3.4 M/UL — LOW (ref 3.8–5.2)
RBC # BLD: 3.52 M/UL — LOW (ref 3.8–5.2)
RBC # BLD: 4 M/UL — SIGNIFICANT CHANGE UP (ref 3.8–5.2)
RBC # FLD: 16.5 % — HIGH (ref 10.3–14.5)
RBC # FLD: 17 % — HIGH (ref 10.3–14.5)
RBC # FLD: 17.1 % — HIGH (ref 10.3–14.5)
RBC # FLD: 17.5 % — HIGH (ref 10.3–14.5)
RBC BLD AUTO: ABNORMAL
SODIUM SERPL-SCNC: 134 MMOL/L — LOW (ref 135–145)
SODIUM SERPL-SCNC: 136 MMOL/L — SIGNIFICANT CHANGE UP (ref 135–145)
SODIUM SERPL-SCNC: 138 MMOL/L — SIGNIFICANT CHANGE UP (ref 135–145)
WBC # BLD: 10.77 K/UL — HIGH (ref 3.8–10.5)
WBC # BLD: 11.34 K/UL — HIGH (ref 3.8–10.5)
WBC # BLD: 9.7 K/UL — SIGNIFICANT CHANGE UP (ref 3.8–10.5)
WBC # BLD: 9.88 K/UL — SIGNIFICANT CHANGE UP (ref 3.8–10.5)
WBC # FLD AUTO: 10.77 K/UL — HIGH (ref 3.8–10.5)
WBC # FLD AUTO: 11.34 K/UL — HIGH (ref 3.8–10.5)
WBC # FLD AUTO: 9.7 K/UL — SIGNIFICANT CHANGE UP (ref 3.8–10.5)
WBC # FLD AUTO: 9.88 K/UL — SIGNIFICANT CHANGE UP (ref 3.8–10.5)

## 2024-04-15 PROCEDURE — 71275 CT ANGIOGRAPHY CHEST: CPT | Mod: 26

## 2024-04-15 PROCEDURE — 93010 ELECTROCARDIOGRAM REPORT: CPT

## 2024-04-15 PROCEDURE — 74174 CTA ABD&PLVS W/CONTRAST: CPT | Mod: 26

## 2024-04-15 RX ORDER — SODIUM CHLORIDE 9 MG/ML
500 INJECTION, SOLUTION INTRAVENOUS ONCE
Refills: 0 | Status: COMPLETED | OUTPATIENT
Start: 2024-04-15 | End: 2024-04-15

## 2024-04-15 RX ORDER — ASCORBIC ACID 60 MG
500 TABLET,CHEWABLE ORAL DAILY
Refills: 0 | Status: DISCONTINUED | OUTPATIENT
Start: 2024-04-15 | End: 2024-04-18

## 2024-04-15 RX ORDER — ACETAMINOPHEN 500 MG
1000 TABLET ORAL ONCE
Refills: 0 | Status: COMPLETED | OUTPATIENT
Start: 2024-04-15 | End: 2024-04-14

## 2024-04-15 RX ORDER — SENNA PLUS 8.6 MG/1
2 TABLET ORAL AT BEDTIME
Refills: 0 | Status: DISCONTINUED | OUTPATIENT
Start: 2024-04-15 | End: 2024-04-18

## 2024-04-15 RX ORDER — SODIUM CHLORIDE 9 MG/ML
1000 INJECTION, SOLUTION INTRAVENOUS
Refills: 0 | Status: DISCONTINUED | OUTPATIENT
Start: 2024-04-15 | End: 2024-04-16

## 2024-04-15 RX ORDER — HEPARIN SODIUM 5000 [USP'U]/ML
10000 INJECTION INTRAVENOUS; SUBCUTANEOUS EVERY 12 HOURS
Refills: 0 | Status: DISCONTINUED | OUTPATIENT
Start: 2024-04-15 | End: 2024-04-18

## 2024-04-15 RX ORDER — ACETAMINOPHEN 500 MG
1000 TABLET ORAL ONCE
Refills: 0 | Status: COMPLETED | OUTPATIENT
Start: 2024-04-15 | End: 2024-04-15

## 2024-04-15 RX ORDER — OXYCODONE HYDROCHLORIDE 5 MG/1
5 TABLET ORAL
Refills: 0 | Status: DISCONTINUED | OUTPATIENT
Start: 2024-04-15 | End: 2024-04-18

## 2024-04-15 RX ORDER — SODIUM CHLORIDE 9 MG/ML
1000 INJECTION, SOLUTION INTRAVENOUS ONCE
Refills: 0 | Status: COMPLETED | OUTPATIENT
Start: 2024-04-15 | End: 2024-04-15

## 2024-04-15 RX ORDER — FERROUS SULFATE 325(65) MG
325 TABLET ORAL THREE TIMES A DAY
Refills: 0 | Status: DISCONTINUED | OUTPATIENT
Start: 2024-04-15 | End: 2024-04-18

## 2024-04-15 RX ADMIN — SIMETHICONE 80 MILLIGRAM(S): 80 TABLET, CHEWABLE ORAL at 22:24

## 2024-04-15 RX ADMIN — Medication 30 MILLIGRAM(S): at 08:45

## 2024-04-15 RX ADMIN — Medication 1000 MILLIGRAM(S): at 06:18

## 2024-04-15 RX ADMIN — Medication 975 MILLIGRAM(S): at 23:30

## 2024-04-15 RX ADMIN — Medication 325 MILLIGRAM(S): at 22:24

## 2024-04-15 RX ADMIN — SENNA PLUS 2 TABLET(S): 8.6 TABLET ORAL at 22:24

## 2024-04-15 RX ADMIN — OXYCODONE HYDROCHLORIDE 5 MILLIGRAM(S): 5 TABLET ORAL at 23:30

## 2024-04-15 RX ADMIN — Medication 30 MILLIGRAM(S): at 08:14

## 2024-04-15 RX ADMIN — SODIUM CHLORIDE 500 MILLILITER(S): 9 INJECTION, SOLUTION INTRAVENOUS at 04:15

## 2024-04-15 RX ADMIN — OXYCODONE HYDROCHLORIDE 5 MILLIGRAM(S): 5 TABLET ORAL at 22:24

## 2024-04-15 RX ADMIN — Medication 30 MILLIGRAM(S): at 17:02

## 2024-04-15 RX ADMIN — SODIUM CHLORIDE 1000 MILLILITER(S): 9 INJECTION, SOLUTION INTRAVENOUS at 09:00

## 2024-04-15 RX ADMIN — HEPARIN SODIUM 10000 UNIT(S): 5000 INJECTION INTRAVENOUS; SUBCUTANEOUS at 17:28

## 2024-04-15 RX ADMIN — PIPERACILLIN AND TAZOBACTAM 200 GRAM(S): 4; .5 INJECTION, POWDER, LYOPHILIZED, FOR SOLUTION INTRAVENOUS at 00:15

## 2024-04-15 RX ADMIN — PIPERACILLIN AND TAZOBACTAM 200 GRAM(S): 4; .5 INJECTION, POWDER, LYOPHILIZED, FOR SOLUTION INTRAVENOUS at 08:16

## 2024-04-15 RX ADMIN — PIPERACILLIN AND TAZOBACTAM 200 GRAM(S): 4; .5 INJECTION, POWDER, LYOPHILIZED, FOR SOLUTION INTRAVENOUS at 16:59

## 2024-04-15 RX ADMIN — Medication 400 MILLIGRAM(S): at 13:19

## 2024-04-15 RX ADMIN — Medication 30 MILLIGRAM(S): at 17:37

## 2024-04-15 RX ADMIN — SODIUM CHLORIDE 100 MILLILITER(S): 9 INJECTION, SOLUTION INTRAVENOUS at 16:14

## 2024-04-15 RX ADMIN — SODIUM CHLORIDE 1000 MILLILITER(S): 9 INJECTION, SOLUTION INTRAVENOUS at 09:55

## 2024-04-15 RX ADMIN — Medication 1000 MILLIGRAM(S): at 14:00

## 2024-04-15 RX ADMIN — Medication 30 MILLIGRAM(S): at 01:05

## 2024-04-15 RX ADMIN — Medication 30 MILLIGRAM(S): at 00:30

## 2024-04-15 RX ADMIN — SODIUM CHLORIDE 150 MILLILITER(S): 9 INJECTION, SOLUTION INTRAVENOUS at 13:31

## 2024-04-15 RX ADMIN — SODIUM CHLORIDE 500 MILLILITER(S): 9 INJECTION, SOLUTION INTRAVENOUS at 03:12

## 2024-04-15 RX ADMIN — Medication 975 MILLIGRAM(S): at 22:23

## 2024-04-15 NOTE — CHART NOTE - NSCHARTNOTEFT_GEN_A_CORE
Pt seen and examined at bedside for evaluation after second unit of pRBCs. Pt states she is feeling much better with only remaining abdominal tenderness. Denies CP, SOB, feeling palpitations, nausea/vomiting, dizziness, HA.     PE:  General: NAD  Pulm: Normal work of breathing, no use of accessory muscles   Cardiac: Tachycardic to 110s  Abd: Soft, appropriately tender with palpation 6/10 per pt, minimally distended   Extremeties: SCDs in place                          8.5    9.70  )-----------( 189      ( 15 Apr 2024 10:25 )             26.8   PT/INR - ( 15 Apr 2024 10:25 )   PT: 12.9 sec;   INR: 1.16 ratio    PTT - ( 15 Apr 2024 10:25 )  PTT:27.7 sec               8.8    10.77 )-----------( 220      ( 15 Apr 2024 06:00 )             28.6                10.1   11.34 )-----------( 233      ( 04-15 @ 02:02 )             32.2                10.8   11.55 )-----------( 146      ( 04-14 @ 22:55 )             37.7                10.2   9.72  )-----------( 298      ( 04-13 @ 17:10 )             33.1     A/p: Pt w/ pLTCS for cat 2 complicated by PPH (EBL 1891mL) now s/p 2u PRBCs, symptomatically improving   - F/u 4PM CBC/CMP/Lactate  - Pt with minimal rise in H/H following 2u PRBCs   - Pt symptomatically improving, afebrile, tachycardia decreasing   - Cont to monitor    Ramya Verdin PGY-1 Pt seen and examined at bedside for evaluation after second unit of pRBCs. Pt states she is feeling much better with only remaining abdominal tenderness. Denies CP, SOB, feeling palpitations, nausea/vomiting, dizziness, HA.     PE:  General: NAD  Pulm: Normal work of breathing, no use of accessory muscles   Cardiac: Tachycardic to 110s  Abd: Soft, appropriately tender with palpation 6/10 per pt, minimally distended   Extremeties: SCDs in place                          8.5    9.70  )-----------( 189      ( 15 Apr 2024 10:25 )             26.8   PT/INR - ( 15 Apr 2024 10:25 )   PT: 12.9 sec;   INR: 1.16 ratio    PTT - ( 15 Apr 2024 10:25 )  PTT:27.7 sec               8.8    10.77 )-----------( 220      ( 15 Apr 2024 06:00 )             28.6                10.1   11.34 )-----------( 233      ( 04-15 @ 02:02 )             32.2                10.8   11.55 )-----------( 146      ( 04-14 @ 22:55 )             37.7                10.2   9.72  )-----------( 298      ( 04-13 @ 17:10 )             33.1     A/p: Pt w/ pLTCS for cat 2 complicated by PPH (EBL 1891mL) now s/p 2u PRBCs, symptomatically improving   - F/u PM CBC/CMP/Lactate  - Pt with minimal rise in H/H following 2u PRBCs   - Pt symptomatically improving, afebrile, tachycardia decreasing   - Cont to monitor    Ramya Verdin PGY-1 Pt seen and examined at bedside for evaluation after second unit of pRBCs. Pt states she is feeling much better with only remaining abdominal tenderness. Denies CP, SOB, feeling palpitations, nausea/vomiting, dizziness, HA.     PE:  General: NAD  Pulm: Normal work of breathing, no use of accessory muscles   Cardiac: Tachycardic to 110s  Abd: Soft, appropriately tender with palpation 6/10 per pt, minimally distended   Extremeties: SCDs in place                          8.5    9.70  )-----------( 189      ( 15 Apr 2024 10:25 )             26.8   PT/INR - ( 15 Apr 2024 10:25 )   PT: 12.9 sec;   INR: 1.16 ratio    PTT - ( 15 Apr 2024 10:25 )  PTT:27.7 sec               8.8    10.77 )-----------( 220      ( 15 Apr 2024 06:00 )             28.6                10.1   11.34 )-----------( 233      ( 04-15 @ 02:02 )             32.2                10.8   11.55 )-----------( 146      ( 04-14 @ 22:55 )             37.7                10.2   9.72  )-----------( 298      ( 04-13 @ 17:10 )             33.1     A/p: Pt w/ pLTCS for cat 2 complicated by PPH (EBL 1891mL) now s/p 2u PRBCs, symptomatically improving   - Pt with minimal rise in H/H following 2u PRBCs   - Pt symptomatically improving, afebrile, tachycardia decreasing   - Pt with 6/10 abdominal tenderness, more on the L than R   - Complete orthostatics   - Give another 1u pRBCS  - CTA A/P ordered    Seen and evaluated with Goldy PGY-4 and Dr. Elena, d/w Dr. Jono Verdin PGY-1 Pt seen and examined at bedside for evaluation after second unit of pRBCs. Pt states she is feeling much better with only remaining abdominal tenderness. Denies CP, SOB, feeling palpitations, nausea/vomiting, dizziness, HA.     PE:  General: NAD  Pulm: Normal work of breathing, no use of accessory muscles   Cardiac: Tachycardic to 110s  Abd: Soft, appropriately tender with palpation 6/10 per pt, minimally distended   Extremeties: SCDs in place                          8.5    9.70  )-----------( 189      ( 15 Apr 2024 10:25 )             26.8   PT/INR - ( 15 Apr 2024 10:25 )   PT: 12.9 sec;   INR: 1.16 ratio    PTT - ( 15 Apr 2024 10:25 )  PTT:27.7 sec               8.8    10.77 )-----------( 220      ( 15 Apr 2024 06:00 )             28.6                10.1   11.34 )-----------( 233      ( 04-15 @ 02:02 )             32.2                10.8   11.55 )-----------( 146      ( 04-14 @ 22:55 )             37.7                10.2   9.72  )-----------( 298      ( 04-13 @ 17:10 )             33.1     A/p: Pt w/ pLTCS for cat 2 complicated by PPH (EBL 1891mL) now s/p 2u PRBCs, symptomatically improving   - Pt with minimal rise in H/H following 2u PRBCs   - Pt symptomatically improving, afebrile, tachycardia decreasing   - Pt with 6/10 abdominal tenderness, more on the L than R   - Complete orthostatics   - Give another 1u pRBCS  - CTA A/P ordered    Seen and evaluated with Goldy PGY-4 and Dr. Elena, d/w Dr. Jono Verdin PGY-1      Associate Chief of L & D (late entry)    Discussion was had with DR Clay due to concerns for this patient and the request earlier to have a third unit given and IV  bolus which was given.  Explained to the providers that after the two units she has not made much improvement and she is tender.  Agree with the requested 3rd unit, CT as discussed in my prior note    Vital Signs Last 24 Hrs  T(C): 36.9 (04-15-24 @ 11:00), Max: 39.9 (04-14-24 @ 22:20)  T(F): 98.4 (04-15-24 @ 11:00), Max: 103.8 (04-14-24 @ 22:20)  HR: 132 (04-15-24 @ 12:30) (105 - 141)  BP: 117/54 (04-15-24 @ 12:30) (82/69 - 160/72)  BP(mean): 68 (04-15-24 @ 12:30) (57 - 126)  RR: 25 (04-15-24 @ 12:30) (15 - 31)  SpO2: 95% (04-15-24 @ 12:30) (88% - 100%)    Orthostatic VS  04-15-24 @ 12:30  Lying BP: 108/56 HR: 121  Sitting BP: 120/62 HR: 121  Standing BP: 117/54 HR: 130  Site: upper left arm  Mode: electronic     acute blood loss anemia on chronic in the setting of suspected sepsis with chorioamnionitis who was under resuscitated .     The resident did protocol the CT of the abdomen-   3rd unit order   - continue IV hydrations  cultures done yesterday are still pending    Mi Yeung M.D., M.B.A., M.S.

## 2024-04-15 NOTE — PROVIDER CONTACT NOTE (OTHER) - BACKGROUND
39.2 weeks S/P Primary  for Chorio and Cat II tracing
 39.2 weeks S/P Primary C Section for Chorio and Cat II

## 2024-04-15 NOTE — PROVIDER CONTACT NOTE (OTHER) - ACTION/TREATMENT ORDERED:
500 cc bolus; Continue to monitor and report any changes
O2 increased to 6L; Continue to monitor and report any changes

## 2024-04-15 NOTE — PROGRESS NOTE ADULT - SUBJECTIVE AND OBJECTIVE BOX
R1 Progress Note    Patient seen and examined at bedside. No acute complaints, pain well controlled. Patient is ambulating and tolerating regular diet. Has not yet passed flatus. Burnham is still in place. Denies CP, SOB, N/V, HA, blurred vision, epigastric pain.    Vital Signs Last 24 Hours  T(C): 37.1 (04-15-24 @ 04:00), Max: 39.9 (04-14-24 @ 22:20)  HR: 121 (04-15-24 @ 05:55) (92 - 141)  BP: 112/55 (04-15-24 @ 05:55) (84/52 - 160/72)  RR: 22 (04-15-24 @ 05:55) (14 - 28)  SpO2: 99% (04-15-24 @ 05:55) (88% - 100%)    I&O's Summary    13 Apr 2024 07:01  -  14 Apr 2024 07:00  --------------------------------------------------------  IN: 1625 mL / OUT: 1200 mL / NET: 425 mL    14 Apr 2024 07:01  -  15 Apr 2024 06:15  --------------------------------------------------------  IN: 7675 mL / OUT: 3818 mL / NET: 3857 mL        Physical Exam:  General: NAD  Abdomen: Soft, non-tender, non-distended, fundus firm  Incision: Pfannenstiel incision CDI, subcuticular suture closure  Pelvic: Lochia wnl    Labs:    Blood Type: O Positive  Antibody Screen: Negative  RPR: Negative               10.1   11.34 )-----------( 233      ( 04-15 @ 02:02 )             32.2                10.8   11.55 )-----------( 146      ( 04-14 @ 22:55 )             37.7                10.2   9.72  )-----------( 298      ( 04-13 @ 17:10 )             33.1         MEDICATIONS  (STANDING):  acetaminophen     Tablet .. 975 milliGRAM(s) Oral <User Schedule>  citric acid/sodium citrate Solution 15 milliLiter(s) Oral every 6 hours  diphtheria/tetanus/pertussis (acellular) Vaccine (Adacel) 0.5 milliLiter(s) IntraMuscular once  heparin   Injectable 29049 Unit(s) SubCutaneous every 12 hours  ibuprofen  Tablet. 600 milliGRAM(s) Oral every 6 hours  ketorolac   Injectable 30 milliGRAM(s) IV Push every 6 hours  lactated ringers. 1000 milliLiter(s) (125 mL/Hr) IV Continuous <Continuous>  oxytocin Infusion 333.333 milliUNIT(s)/Min (1000 mL/Hr) IV Continuous <Continuous>  piperacillin/tazobactam IVPB.. 4.5 Gram(s) IV Intermittent every 8 hours    MEDICATIONS  (PRN):  diphenhydrAMINE 25 milliGRAM(s) Oral every 6 hours PRN Pruritus  lanolin Ointment 1 Application(s) Topical every 6 hours PRN Sore Nipples  magnesium hydroxide Suspension 30 milliLiter(s) Oral two times a day PRN Constipation  oxyCODONE    IR 5 milliGRAM(s) Oral every 3 hours PRN Moderate to Severe Pain (4-10)  oxyCODONE    IR 5 milliGRAM(s) Oral once PRN Moderate to Severe Pain (4-10)  simethicone 80 milliGRAM(s) Chew every 4 hours PRN Gas   R1 Progress Note    Patient seen and examined at bedside. No acute complaints, pain well controlled. Patient is tolerating PO. SCDs in place. Has not yet passed flatus. Burnham is still in place. Denies CP, SOB, N/V, HA, blurred vision, epigastric pain. Patient has 1u PRBC currently running, states she has improved throughout the morning.    Vital Signs Last 24 Hours  T(C): 37.1 (04-15-24 @ 04:00), Max: 39.9 (04-14-24 @ 22:20)  HR: 121 (04-15-24 @ 05:55) (92 - 141)  BP: 112/55 (04-15-24 @ 05:55) (84/52 - 160/72)  RR: 22 (04-15-24 @ 05:55) (14 - 28)  SpO2: 99% (04-15-24 @ 05:55) (88% - 100%)    I&O's Summary    13 Apr 2024 07:01  -  14 Apr 2024 07:00  --------------------------------------------------------  IN: 1625 mL / OUT: 1200 mL / NET: 425 mL    14 Apr 2024 07:01  -  15 Apr 2024 06:15  --------------------------------------------------------  IN: 7675 mL / OUT: 3818 mL / NET: 3857 mL        Physical Exam:  General: NAD  Abdomen: Soft, non-tender, non-distended, fundus firm  Incision: Pfannenstiel incision CDI, subcuticular suture closure  Pelvic: Lochia wnl    Labs:    Blood Type: O Positive  Antibody Screen: Negative  RPR: Negative                          8.8    10.77 )-----------( 220      ( 15 Apr 2024 06:00 )             28.6                10.1   11.34 )-----------( 233      ( 04-15 @ 02:02 )             32.2                10.8   11.55 )-----------( 146      ( 04-14 @ 22:55 )             37.7                10.2   9.72  )-----------( 298      ( 04-13 @ 17:10 )             33.1         MEDICATIONS  (STANDING):  acetaminophen     Tablet .. 975 milliGRAM(s) Oral <User Schedule>  citric acid/sodium citrate Solution 15 milliLiter(s) Oral every 6 hours  diphtheria/tetanus/pertussis (acellular) Vaccine (Adacel) 0.5 milliLiter(s) IntraMuscular once  heparin   Injectable 99011 Unit(s) SubCutaneous every 12 hours  ibuprofen  Tablet. 600 milliGRAM(s) Oral every 6 hours  ketorolac   Injectable 30 milliGRAM(s) IV Push every 6 hours  lactated ringers. 1000 milliLiter(s) (125 mL/Hr) IV Continuous <Continuous>  oxytocin Infusion 333.333 milliUNIT(s)/Min (1000 mL/Hr) IV Continuous <Continuous>  piperacillin/tazobactam IVPB.. 4.5 Gram(s) IV Intermittent every 8 hours    MEDICATIONS  (PRN):  diphenhydrAMINE 25 milliGRAM(s) Oral every 6 hours PRN Pruritus  lanolin Ointment 1 Application(s) Topical every 6 hours PRN Sore Nipples  magnesium hydroxide Suspension 30 milliLiter(s) Oral two times a day PRN Constipation  oxyCODONE    IR 5 milliGRAM(s) Oral every 3 hours PRN Moderate to Severe Pain (4-10)  oxyCODONE    IR 5 milliGRAM(s) Oral once PRN Moderate to Severe Pain (4-10)  simethicone 80 milliGRAM(s) Chew every 4 hours PRN Gas

## 2024-04-15 NOTE — CHART NOTE - NSCHARTNOTEFT_GEN_A_CORE
**Late entry secondary to clinical duties**    Pt seen and evaluated at bedside after results of CTA AP and repeat labwork were reassuring. Patient vitals stable and improving, now tachycardic to the low 100s, UOP adequate for more than 3 hours, respiratory status with O2 sat >95%, abdominal exam benign. Patient cleared for discharge from PACU to postpartum floor. Pt still on LR@75ml, to be discontinued in the AM pending reassuring status in the setting of infection.     D/w Dr. De La Cruz, Dr. Elena, [facility] PGY-4   Ramya Verdin PGY-1

## 2024-04-15 NOTE — PROGRESS NOTE ADULT - ASSESSMENT
37yo  POD#1 pLTCS for Cat 2 tracing, intrapartum course complicated by Chorio. Delivery complicated by PPH (EBL 1891) for bleeding sinuses. Postpartum course complicated by RRT called for tachycardia, desaturations to 88%, and endometritis with a shock index of 1.23.    #PPH, RRT called in PACU  - s/p TXA, extra pitocin, IM methergine, & transfusion 1u pRBCs intraop   - H/H 10.2/33.1->10.8/37.7->10.1/32.2, f/u 6AM CBC   - PTT >200 with 3AM labs, possibly from heparinized syringe, f/u 6AM result  - Pt was tachycardic to the 130s-140s, desaturation to 88%, low urine output   - Pt on 6L NC and continuous pulse ox  - CTA PE ordered, with preliminary read as no pulmonary embolism  - Cont to monitor     #Chorio, endometritis  - Pt last febrile 4/15 @135a to 38C  - Blood cx, urine cx, placental cx sent  - On zosyn until 24H afebrile  - WBC 7.11->10.66, F/u AM CBC     #Postpartum state  - Continue with po analgesia  - Increase ambulation  - Continue regular diet  - Check CBC  - Incision dressing removed  - DVT prophylaxis with 5000u heparin  - Encourage incentive spirometry    Ramya Verdin PGY-1   39yo  POD#1 pLTCS for Cat 2 tracing, intrapartum course complicated by Chorio. Delivery complicated by PPH (EBL 1891) for bleeding sinuses. Postpartum course complicated by RRT called for tachycardia, desaturations to 88%, and endometritis with a shock index of 1.23.    #PPH, RRT called in PACU  - s/p TXA, extra pitocin, IM methergine, & transfusion 1u pRBCs intraop   - H/H 10.2/33.1->10.8/37.7->10.1/32.2->8.8/28.6  - PTT >200 with 3AM labs, possibly from heparinized syringe, 6am PTT 23.2  - Pt was tachycardic to the 130s-140s, desaturation to 88%, low urine output   - Lactate 2.9, f/u PM result   - Pt on 6L NC and continuous pulse ox  - CTA PE ordered, with preliminary read as no pulmonary embolism  - Cont to monitor     #Chorio, endometritis  - Pt last febrile 4/15 @135a to 38C  - Blood cx, urine cx, placental cx sent  - On zosyn until 24H afebrile  - WBC 7.11->10.66, F/u AM CBC     #Postpartum state  - Continue with po analgesia  - Increase ambulation  - Continue regular diet  - Check CBC  - Incision dressing removed  - DVT prophylaxis with 5000u heparin  - Encourage incentive spirometry    Ramya Verdin PGY-1   37yo  POD#1 pLTCS for Cat 2 tracing, intrapartum course complicated by Chorio. Delivery complicated by PPH (EBL 1891) for bleeding sinuses. Postpartum course complicated by RRT called for tachycardia, desaturations to 88%, and endometritis with a shock index of 1.23. Pt currently feeling well, continuing to monitor, with 1u PRBC running     #PPH, RRT called in PACU  - Pt with increasing UOP, O2 sat >95%, still tachycardic to 120s   - s/p TXA, extra pitocin, IM methergine, & transfusion 1u pRBCs intraop  - Addition 1u pRBC ordered   - H/H 10.2/33.1->10.8/37.7->10.1/32.2->8.8/28.6  - PTT >200 with 3AM labs, possibly from heparinized syringe, 6am PTT 23.2  - Pt was tachycardic to the 130s-140s, desaturation to 88%, low urine output   - Lactate 2.9, f/u PM result   - Pt on 6L NC and continuous pulse ox  - CTA PE ordered, with preliminary read as no pulmonary embolism  - Cont to monitor     #Chorio, endometritis  - Pt last febrile 4/15 @135a to 38C  - Blood cx, urine cx, placental cx sent  - On zosyn until 24H afebrile  - WBC 79.72->11.55->11.34->10.77     #Postpartum state  - Continue with po analgesia  - Increase ambulation  - Continue regular diet  - DVT prophylaxis with 53385g heparin  - Encourage incentive spirometry    aRmya Verdin PGY-1

## 2024-04-15 NOTE — PROGRESS NOTE ADULT - ATTENDING COMMENTS
Associate Chief of L & D ( late entry)     I have met this patient for the first time this morning.  She was admitted as an elective Induction of labor by Dr Mcclain and delivered by DR Smyth    OB Progress Note:  Delivery, POD#1    S: 39yo POD#1 s/p LTCS . Patient c/o od abdominal discomfort     O:   Vital Signs Last 24 Hrs  T(C): 36.9 (15 Apr 2024 11:00), Max: 39.9 (2024 22:20)  T(F): 98.4 (15 Apr 2024 11:00), Max: 103.8 (2024 22:20)  HR: 118 (15 Apr 2024 11:35) (105 - 141)  BP: 109/64 (15 Apr 2024 11:35) (82/69 - 160/72)  BP(mean): 74 (15 Apr 2024 11:35) (57 - 126)  RR: 23 (15 Apr 2024 11:35) (16 - 28)  SpO2: 96% (15 Apr 2024 11:35) (88% - 100%)    Parameters below as of 15 Apr 2024 06:30  Patient On (Oxygen Delivery Method): nasal cannula  O2 Flow (L/min): 2      Labs:  Blood type: O Positive  Rubella IgG: RPR: Negative                          8.5<L>   9.70 >-----------< 189    ( 04-15 @ 10:25 )             26.8<L>                        8.8<L>   10.77<H> >-----------< 220    ( 04-15 @ 06:00 )             28.6<L>                        10.1<L>   11.34<H> >-----------< 233    ( 04-15 @ 02:02 )             32.2<L>                        10.8<L>   11.55<H> >-----------< 146<L>    (  @ 22:55 )             37.7                        10.2<L>   9.72 >-----------< 298    (  @ 17:10 )             33.1<L>    04-15-24 @ 10:25  136  |  106  |  9   ----------------------------<  130<H>  3.8   |  18<L>  |  0.73    04-15-24 @ 06:00  138  |  107  |  7   ----------------------------<  95  4.3   |  17<L>  |  0.62    04-15-24 @ 02:02  134<L>  |  105  |  6<L>  ----------------------------<  107<H>  3.7   |  16<L>  |  0.49<L>    lactate 2.9 --->  currently 2.6    Ca    7.6<L>      15 Apr 2024 10:25  Ca    7.9<L>      15 Apr 2024 06:00  Ca    8.0<L>      15 Apr 2024 02:02  TPro  4.1<L>  /  Alb  2.3<L>  /  TBili  1.0  /  DBili  x   /  AST  34<H>  /  ALT  11  /  AlkPhos  143<H>  04-15-24 @ 10:25  TPro  4.7<L>  /  Alb  2.3<L>  /  TBili  0.6  /  DBili  x   /  AST  31  /  ALT  8   /  AlkPhos  160<H>  04-15-24 @ 06:00    PE:  Abdomen: soft, Mildly distended, tender 8/10,   incision c/d/i.  Lochia scant   Extremities: No erythema, +2 edema    A/P: 39yo POD#1 s/p LTCS. due to CAT II arrest of dilatation and chorioamnionitis and suspected sepsis via criteria and  acute on chronic anemia due to PPH with qbl 1841 cc unresuscitated ( lactate and H/H  essentially the same)now s/p 2 units PRC and was given 2 liters bolus.  Patient is not demonstrating any leukocytosis at this time or left shift.  Vital reviewed and there were periods of hypotension and tachycardia.    - Continue regular diet.  - Increase ambulation.  - Continue motrin, tylenol, oxycodone PRN for pain control.  vs. continue PCEA for pain.  - F/u AM CBC and lactate  - This patient was discussed on safety round also with Dr. Sebastian that this patient would benefit from aggressive resusitation in the setting of acute blood loss and presumed sespis.  - Discussed with the covering attending and the resident that we had discussed this morning that the patient would benefit from 3rd unit  - orthostatics if patient is able to tolerate   - get CT of the abdomen to evaluate for intraperitoneal collection/hematoma. Doubt active bleed at this time.  - This was discussed with patient and her partner this morning  Mi Yeung M.D., M.B.A., M.S.

## 2024-04-15 NOTE — CHART NOTE - NSCHARTNOTEFT_GEN_A_CORE
PGY4 Clinical Evaluation Note    Made aware by pts RN that pt with O2 desaturation to 88 on RA. Evaluated pt at bedside with PGY1 Alla Urrutia, Charge RN Janette Martinez, ANM Jaqueline Boyd, & Primary RN Antoinette Spencer. Pt lying in bed w/ head only mildly elevated. Assisted pt in sitting up in bed & 2L O2 via nasal canula placed on patient. Educated patient on incentive spirometer use. Pt feels fatigued & w/ c/o abdominal tenderness. Pt denies c/o CP or SOB. Discussed with patient the cause of her current abnormal vital signs (including ongoing infection/endometritis & elevated EBL). Discussed plan of care with patient (see A/P).    Vital Signs Last 24 Hrs  T(C): 39.3 (15 Apr 2024 00:20), Max: 39.9 (14 Apr 2024 22:20)  T(F): 102.7 (15 Apr 2024 00:20), Max: 103.8 (14 Apr 2024 22:20)  HR: 128 (15 Apr 2024 00:35) (92 - 141)  BP: 105/57 (15 Apr 2024 00:35) (84/52 - 160/72)  BP(mean): 69 (15 Apr 2024 00:35) (67 - 126)  ABP: --  ABP(mean): --  RR: 19 (15 Apr 2024 00:35) (13 - 28)  SpO2: 92% (15 Apr 2024 00:35) (88% - 100%)    Physical Exam:  General: appears fatigued & diaphoretic   CV: tachycardic  Lungs: CTA-B  Abdomen: Soft, expected tenderness, non-distended, fundus firm  Incision: CDI with aquacel dressing in place  Pelvic: normal lochia noted, pads not saturated with blood at this time  Ext: Non-tender, mild BL LE edema, SCDs in place    A/P: Pt is POD#1 from pLTCS for Cat 2 FHT in setting of chorioamnionitis c/b PPH (QBL:1891 2/2 large uterine sinuses & early atony) s/p transfusion 1u pRBCs intraop & endometritis (postpartum Tmax: 39.9C).     -CBC from 1030pm w/ stable H/H, WBC 10.55  -blood cultures drawn from single site (per nursing, pt extremely difficult blood draw)--nursing team will continue to try for draw from second site  -UCx sent  -plan to continue zosyn 24hrs postpartum (most recent dose at 1215am)  -continue IV tylenol (most recent dose given ~1130p)  -toradol administered for pain control  -repeat CBC & lactate to be sent at 230am  -continue LR@125  -continue 2L via NC, wean as tolerated  -monitor vital signs  -escalation of care as needed--discussed with team, pt does not need RRT evaluation at this time--if pts clinical status worsens, will consider evaluation by higher level of care    Ashlie, PGY4  D/w Dr. Worthington PGY4 Clinical Evaluation Note    Made aware by pts RN that pt with O2 desaturation to 88 on RA. Evaluated pt at bedside with PGY1 Alla Urrutia, Charge RN Janette Martinez, ANM Jaqueline Boyd, & Primary RN Antoinette Spencer. Pt lying in bed w/ head only mildly elevated. Assisted pt in sitting up in bed & 2L O2 via nasal canula placed on patient. Educated patient on incentive spirometer use. Pt feels fatigued & w/ c/o abdominal tenderness. Pt denies c/o CP or SOB. Discussed with patient the cause of her current abnormal vital signs (including ongoing infection/endometritis & elevated EBL). Discussed plan of care with patient (see A/P).    Vital Signs Last 24 Hrs  T(C): 39.3 (15 Apr 2024 00:20), Max: 39.9 (14 Apr 2024 22:20)  T(F): 102.7 (15 Apr 2024 00:20), Max: 103.8 (14 Apr 2024 22:20)  HR: 128 (15 Apr 2024 00:35) (92 - 141)  BP: 105/57 (15 Apr 2024 00:35) (84/52 - 160/72)  BP(mean): 69 (15 Apr 2024 00:35) (67 - 126)  ABP: --  ABP(mean): --  RR: 19 (15 Apr 2024 00:35) (13 - 28)  SpO2: 92% (15 Apr 2024 00:35) (88% - 100%)    Physical Exam:  General: appears fatigued & diaphoretic   CV: tachycardic  Lungs: CTA-B  Abdomen: Soft, expected tenderness, non-distended, fundus firm  Incision: CDI with aquacel dressing in place  Pelvic: normal lochia noted, pads not saturated with blood at this time  Ext: Non-tender, mild BL LE edema, SCDs in place    A/P: Pt is POD#1 from pLTCS for Cat 2 FHT in setting of chorioamnionitis c/b PPH (QBL:1891 2/2 large uterine sinuses & early atony) s/p TXA, extra pitocin, IM methergine, & transfusion 1u pRBCs intraop & endometritis (postpartum Tmax: 39.9C).     -CBC from 1030pm w/ stable H/H, WBC 10.55  -blood cultures drawn from single site (per nursing, pt extremely difficult blood draw)--nursing team will continue to try for draw from second site  -UCx sent  -plan to continue zosyn 24hrs postpartum (most recent dose at 1215am)  -continue IV tylenol (most recent dose given ~1130p)  -toradol administered for pain control  -repeat CBC & lactate to be sent at 230am  -continue LR@125  -encourage incentive spirometer use, continue 2L via NC, wean as tolerated  -monitor vital signs  -escalation of care as needed--discussed with team, pt does not need RRT evaluation at this time--if pts clinical status worsens, will consider evaluation by higher level of care    Ashlie, PGY4  D/w Dr. Worthington PGY4 Clinical Evaluation Note    Made aware by pts RN that pt with O2 desaturation to 88 on RA. Evaluated pt at bedside with PGY1 Alla Urrutia, Charge RN Janette Martinez, ANM Jaqueline Boyd, & Primary RN Antoinette Spencer. Pt lying in bed w/ head only mildly elevated. Assisted pt in sitting up in bed & 2L O2 via nasal canula placed on patient. Educated patient on incentive spirometer use. Pt feels fatigued & w/ c/o abdominal tenderness. Pt denies c/o CP or SOB. Discussed with patient the cause of her current abnormal vital signs (including ongoing infection/endometritis & elevated EBL). Discussed plan of care with patient (see A/P).    Vital Signs Last 24 Hrs  T(C): 39.3 (15 Apr 2024 00:20), Max: 39.9 (14 Apr 2024 22:20)  T(F): 102.7 (15 Apr 2024 00:20), Max: 103.8 (14 Apr 2024 22:20)  HR: 128 (15 Apr 2024 00:35) (92 - 141)  BP: 105/57 (15 Apr 2024 00:35) (84/52 - 160/72)  BP(mean): 69 (15 Apr 2024 00:35) (67 - 126)  ABP: --  ABP(mean): --  RR: 19 (15 Apr 2024 00:35) (13 - 28)  SpO2: 92% (15 Apr 2024 00:35) (88% - 100%)    Physical Exam:  General: appears fatigued & diaphoretic   CV: tachycardic  Lungs: CTA-B  Abdomen: Soft, expected tenderness, non-distended, fundus firm  Incision: CDI with aquacel dressing in place  Pelvic: normal lochia noted, pads not saturated with blood at this time  Ext: Non-tender, mild BL LE edema, SCDs in place    A/P: Pt is POD#1 from pLTCS for Cat 2 FHT in setting of chorioamnionitis c/b PPH (QBL:1891 2/2 large uterine sinuses & early atony) s/p TXA, extra pitocin, IM methergine, & transfusion 1u pRBCs intraop & endometritis (postpartum Tmax: 39.9C).     -CBC from 1030pm w/ stable H/H, WBC 11.55  -blood cultures drawn from single site (per nursing, pt extremely difficult blood draw)--nursing team will continue to try for draw from second site  -UCx sent  -plan to continue zosyn 24hrs postpartum (most recent dose at 1215am)  -continue IV tylenol (most recent dose given ~1130p)  -toradol administered for pain control  -CBC, BMP, coags, & lactate to be sent at 230am  -continue LR@125  -encourage incentive spirometer use, continue 2L via NC, wean as tolerated  -monitor vital signs  -escalation of care as needed--discussed with team, pt does not need RRT evaluation at this time--if pts clinical status worsens, will consider evaluation by higher level of care    Ashlie, PGY4  D/w Dr. Worthington PGY4 Clinical Evaluation Note    Made aware by pts RN that pt with O2 desaturation to 88 on RA. Evaluated pt at bedside with PGY1 Alla Urrutia, Charge RN Janette Martinez, ANM Jaqueline Boyd, & Primary RN Antoinette Spencer. Pt lying in bed w/ head only mildly elevated. Assisted pt in sitting up in bed & 2L O2 via nasal canula placed on patient. Educated patient on incentive spirometer use. Pt feels fatigued & w/ c/o abdominal tenderness. Pt denies c/o CP or SOB. Discussed with patient the cause of her current abnormal vital signs (including ongoing infection/endometritis & elevated EBL). Discussed plan of care with patient (see A/P).    Vital Signs Last 24 Hrs  T(C): 39.3 (15 Apr 2024 00:20), Max: 39.9 (14 Apr 2024 22:20)  T(F): 102.7 (15 Apr 2024 00:20), Max: 103.8 (14 Apr 2024 22:20)  HR: 128 (15 Apr 2024 00:35) (92 - 141)  BP: 105/57 (15 Apr 2024 00:35) (84/52 - 160/72)  BP(mean): 69 (15 Apr 2024 00:35) (67 - 126)  ABP: --  ABP(mean): --  RR: 19 (15 Apr 2024 00:35) (13 - 28)  SpO2: 92% (15 Apr 2024 00:35) (88% - 100%)    Physical Exam:  General: appears fatigued & diaphoretic   CV: tachycardic  Lungs: CTA-B  Abdomen: Soft, expected tenderness, non-distended, fundus firm  Incision: CDI with aquacel dressing in place  Pelvic: normal lochia noted, pads not saturated with blood at this time  Ext: Non-tender, mild BL LE edema, SCDs in place    A/P: Pt is POD#1 from pLTCS for Cat 2 FHT in setting of chorioamnionitis c/b PPH (QBL:1891 2/2 large uterine sinuses & early atony) s/p TXA, extra pitocin, IM methergine, & transfusion 1u pRBCs intraop & endometritis (postpartum Tmax: 39.9C).     -CBC from 11pm w/ stable H/H, WBC 11.55  -blood cultures drawn from single site (per nursing, pt extremely difficult blood draw)--nursing team will continue to try for draw from second site  -UCx sent  -plan to continue zosyn 24hrs postpartum (most recent dose at 1215am)  -continue IV tylenol (most recent dose given ~1130p)  -toradol administered for pain control  -CBC, BMP, coags, & lactate to be sent at 3am  -continue LR@125  -encourage incentive spirometer use, continue 2L via NC, wean as tolerated  -monitor vital signs  -escalation of care as needed--discussed with team, pt does not need RRT evaluation at this time--if pts clinical status worsens, will consider evaluation by higher level of care    Ashlie, PGY4  D/w Dr. Worthington PGY4 Clinical Evaluation Note    Made aware by pts RN that pt with O2 desaturation to 88 on RA. Evaluated pt at bedside with PGY1 Alla Urrutia, Charge RN Janette Martinez, ANM Jaqueline Boyd, & Primary RN Antoinette Specner. Pt lying in bed w/ head only mildly elevated. Assisted pt in sitting up in bed & 2L O2 via nasal canula placed on patient. Educated patient on incentive spirometer use. Pt feels fatigued & w/ c/o abdominal tenderness. Pt denies c/o CP or SOB. Discussed with patient the cause of her current abnormal vital signs (including ongoing infection/endometritis & elevated EBL). Discussed plan of care with patient (see A/P).    Vital Signs Last 24 Hrs  T(C): 39.3 (15 Apr 2024 00:20), Max: 39.9 (14 Apr 2024 22:20)  T(F): 102.7 (15 Apr 2024 00:20), Max: 103.8 (14 Apr 2024 22:20)  HR: 128 (15 Apr 2024 00:35) (92 - 141)  BP: 105/57 (15 Apr 2024 00:35) (84/52 - 160/72)  BP(mean): 69 (15 Apr 2024 00:35) (67 - 126)  ABP: --  ABP(mean): --  RR: 19 (15 Apr 2024 00:35) (13 - 28)  SpO2: 92% (15 Apr 2024 00:35) (88% - 100%)    Physical Exam:  General: appears fatigued & diaphoretic   CV: tachycardic  Lungs: CTA-B  Abdomen: Soft, expected tenderness, non-distended, fundus firm  Incision: CDI with aquacel dressing in place  Pelvic: normal lochia noted, pads not saturated with blood at this time  Ext: Non-tender, mild BL LE edema, SCDs in place    A/P: Pt is POD#1 from pLTCS for Cat 2 FHT in setting of chorioamnionitis c/b PPH (QBL:1891 2/2 large uterine sinuses & early atony) s/p TXA, extra pitocin, IM methergine, & transfusion 1u pRBCs intraop & endometritis (postpartum Tmax: 39.9C).     -CBC from 11pm w/ stable H/H, WBC 11.55  -blood cultures drawn from single site (per nursing, pt extremely difficult blood draw)--nursing team will continue to try for draw from second site  -UCx sent  -plan to continue zosyn 24hrs postpartum (most recent dose at 1215am)  -continue IV tylenol (most recent dose given ~1130p)  -toradol administered for pain control  -CBC, BMP, coags, & lactate to be sent at 3am  -continue LR@125  -encourage incentive spirometer use, continue 2L via NC, wean as tolerated (pt declines feeling SOB, denies experiencing asthma related symptoms)  -monitor vital signs  -escalation of care as needed--discussed with team, pt does not need RRT evaluation at this time--if pts clinical status worsens, will consider evaluation by higher level of care    Ashlie PGY4  D/w Dr. Worthington    ------------------------------------------  Addendum:    Made aware by primary RN that pt now meets criteria for RRT given elevated shock index. SICU team made aware & RRT subsequently called.   -EKG obtained, sinus tachycardia  -given persistent tachycardia & O2 desaturation will order CTAngio Chest to r/o PE given elevated VTE risk in setting of elevated BMI, sedentary status, & postoperative state  -will continue with the above plan, labs being drawn early with the assistance of ICU team  -pt deemed stable enough to remain in L&D PACU for monitoring, will contact SICU if pts status decompensates    VAISHNAVI DaileyY4  Evaluated w/ Dr. Worthington PGY4 Clinical Evaluation Note ([id number])    Made aware by pts RN that pt with O2 desaturation to 88 on RA. Evaluated pt at bedside with PGY1 Alla Urrutia, Charge RN Janette Martinez, ANM Jaqueline Boyd, & Primary RN Antoinette Spencer. Pt lying in bed w/ head only mildly elevated. Assisted pt in sitting up in bed & 2L O2 via nasal canula placed on patient. Educated patient on incentive spirometer use. Pt feels fatigued & w/ c/o abdominal tenderness. Pt denies c/o CP or SOB. Discussed with patient the cause of her current abnormal vital signs (including ongoing infection/endometritis & elevated EBL). Discussed plan of care with patient (see A/P).    Vital Signs Last 24 Hrs  T(C): 39.3 (15 Apr 2024 00:20), Max: 39.9 (14 Apr 2024 22:20)  T(F): 102.7 (15 Apr 2024 00:20), Max: 103.8 (14 Apr 2024 22:20)  HR: 128 (15 Apr 2024 00:35) (92 - 141)  BP: 105/57 (15 Apr 2024 00:35) (84/52 - 160/72)  BP(mean): 69 (15 Apr 2024 00:35) (67 - 126)  ABP: --  ABP(mean): --  RR: 19 (15 Apr 2024 00:35) (13 - 28)  SpO2: 92% (15 Apr 2024 00:35) (88% - 100%)    Physical Exam:  General: appears fatigued & diaphoretic   CV: tachycardic  Lungs: CTA-B  Abdomen: Soft, expected tenderness, non-distended, fundus firm  Incision: CDI with aquacel dressing in place  Pelvic: normal lochia noted, pads not saturated with blood at this time  Ext: Non-tender, mild BL LE edema, SCDs in place    A/P: Pt is POD#1 from pLTCS for Cat 2 FHT in setting of chorioamnionitis c/b PPH (QBL:1891 2/2 large uterine sinuses & early atony) s/p TXA, extra pitocin, IM methergine, & transfusion 1u pRBCs intraop & endometritis (postpartum Tmax: 39.9C).     -CBC from 11pm w/ stable H/H, WBC 11.55  -blood cultures drawn from single site (per nursing, pt extremely difficult blood draw)--nursing team will continue to try for draw from second site  -UCx sent  -plan to continue zosyn 24hrs postpartum (most recent dose at 1215am)  -continue IV tylenol (most recent dose given ~1130p)  -toradol administered for pain control  -CBC, BMP, coags, & lactate to be sent at 3am  -continue LR@125  -encourage incentive spirometer use, continue 2L via NC, wean as tolerated (pt declines feeling SOB, denies experiencing asthma related symptoms)  -monitor vital signs  -escalation of care as needed--discussed with team, pt does not need RRT evaluation at this time--if pts clinical status worsens, will consider evaluation by higher level of care    Ashlie, VAISHNAVIY4  D/w Dr. Worthington    ------------------------------------------  Addendum: ([id number])    Made aware by primary RN that pt now meets criteria for RRT given elevated shock index. SICU team made aware & RRT subsequently called.   -EKG obtained, sinus tachycardia  -given persistent tachycardia & O2 desaturation will order CTAngio Chest to r/o PE given elevated VTE risk in setting of elevated BMI, sedentary status, & postoperative state  -will continue with the above plan, labs being drawn early with the assistance of ICU team  -pt deemed stable enough to remain in L&D PACU for monitoring, will contact SICU if pts status decompensates    VAISHNAVI DaileyY4  Evaluated w/ Dr. Worthington PGY4 Clinical Evaluation Note ([id number])    Made aware by pts RN that pt with O2 desaturation to 88 on RA. Evaluated pt at bedside with PGY1 Alla Urrutia, Charge RN Janette Martinez, ANM Jaqueline Boyd, & Primary RN Antoinette Spencer. Pt lying in bed w/ head only mildly elevated. Assisted pt in sitting up in bed & 2L O2 via nasal canula placed on patient. Educated patient on incentive spirometer use. Pt feels fatigued & w/ c/o abdominal tenderness. Pt denies c/o CP or SOB. Discussed with patient the cause of her current abnormal vital signs (including ongoing infection/endometritis & elevated EBL). Discussed plan of care with patient (see A/P).    Vital Signs Last 24 Hrs  T(C): 39.3 (15 Apr 2024 00:20), Max: 39.9 (14 Apr 2024 22:20)  T(F): 102.7 (15 Apr 2024 00:20), Max: 103.8 (14 Apr 2024 22:20)  HR: 128 (15 Apr 2024 00:35) (92 - 141)  BP: 105/57 (15 Apr 2024 00:35) (84/52 - 160/72)  BP(mean): 69 (15 Apr 2024 00:35) (67 - 126)  ABP: --  ABP(mean): --  RR: 19 (15 Apr 2024 00:35) (13 - 28)  SpO2: 92% (15 Apr 2024 00:35) (88% - 100%)    Physical Exam:  General: appears fatigued & diaphoretic   CV: tachycardic  Lungs: CTA-B  Abdomen: Soft, expected tenderness, non-distended, fundus firm  Incision: CDI with aquacel dressing in place  Pelvic: normal lochia noted, pads not saturated with blood at this time  Ext: Non-tender, mild BL LE edema, SCDs in place    A/P: Pt is POD#1 from pLTCS for Cat 2 FHT in setting of chorioamnionitis c/b PPH (QBL:1891 2/2 large uterine sinuses & early atony) s/p TXA, extra pitocin, IM methergine, & transfusion 1u pRBCs intraop & endometritis (postpartum Tmax: 39.9C).     -CBC from 11pm w/ stable H/H, WBC 11.55  -blood cultures drawn from single site (per nursing, pt extremely difficult blood draw)--nursing team will continue to try for draw from second site  -UCx sent  -plan to continue zosyn 24hrs postpartum (most recent dose at 1215am)  -continue IV tylenol (most recent dose given ~1130p)  -toradol administered for pain control  -CBC, BMP, coags, & lactate to be sent at 3am  -continue LR@125  -encourage incentive spirometer use, continue 2L via NC, wean as tolerated (pt declines feeling SOB, denies experiencing asthma related symptoms)  -monitor vital signs  -escalation of care as needed--discussed with team, pt does not need RRT evaluation at this time--if pts clinical status worsens, will consider evaluation by higher level of care    VAISHNAVI DaileyY4  D/w Dr. Worthington    ------------------------------------------  Addendum: ([id number])    Made aware by primary RN that pt now meets criteria for RRT given elevated shock index. SICU team made aware & RRT subsequently called.     VS  T(C): 39.3 (04-15-24 @ 00:20)  HR: 133 (04-15-24 @ 01:35)  BP: 126/71 (04-15-24 @ 01:35)  RR: 20 (04-15-24 @ 01:35)  SpO2: 93% (04-15-24 @ 01:35)    -EKG obtained, sinus tachycardia  -given persistent tachycardia & O2 desaturation will order CT Angio Chest to r/o PE given elevated VTE risk in setting of elevated BMI, sedentary status, & postoperative state  -O2 via nasal canula increased to 4L  -will continue with the above plan, labs being drawn early with the assistance of ICU team  -pt deemed stable enough to remain in L&D PACU for monitoring, will contact SICU if pts status decompensates    VAISHNAVI DaileyY4  Evaluated w/ Dr. Worthington

## 2024-04-15 NOTE — PROVIDER CONTACT NOTE (OTHER) - ASSESSMENT
SpO2 91-92; Fundus is firm with light bleeding; Patient denies any pain or feeling unwell
Urine output 5cc - Fundus is firm with light bleeding; Patient denies any pain or feeling unwell

## 2024-04-15 NOTE — PROGRESS NOTE ADULT - ASSESSMENT
ANESTHESIA POSTOP CHECK    38y Female POSTOP DAY 1     No COMPLAINTS    NO APPARENT ANESTHESIA COMPLICATIONS

## 2024-04-15 NOTE — OB POSTPARTUM EVENT NOTE - NS_EVENTPTSUMMARY3_OBGYN_ALL_OB_FT
BP - 108/57   temp 37.6   o2 93% 4L nasal cannula   RR - 19   scant vaginal bleeding   fundus firm&at   a&o x4   denies SOB/CP/ visual changes

## 2024-04-15 NOTE — OB POSTPARTUM EVENT NOTE - NS_EVENTSUMMARY1_OBGYN_ALL_OB_FT
PP tachycardia 130-140 consistently / o2 sat 89-91% on RA. pt denies CP/ SOB MEWS score 9. Shock index 1.23 urine output 75/hr temp 39.9 upon arrival to PACU. Most recent temp 39.4. fundus firm @ umbilicus. Light to moderate vaginal bleeding present. s/p 1U prbs in OR with QBL of 1891cc. pt received TXA/ methergine/ 20u extra pit. BP's 130s/90s PP tachycardia 130-140 consistently / o2 sat 89-91% on RA. pt denies CP/ SOB MEWS score 9. Shock index 1.23 urine output 75/hr temp 39.9 upon arrival to PACU. Most recent temp 39.4. fundus firm @ umbilicus. Light to moderate vaginal bleeding present. s/p 1U prbs in OR with QBL of 1891cc. pt received TXA/ methergine/ 20u extra pit. BP's 130s/90s. PP tachycardia 130-140 consistently / o2 sat 89-91% on RA. pt denies CP/ SOB MEWS score 9. Shock index 1.23 urine output 75/hr temp 39.9 upon arrival to PACU orally. Most recent temp 39.4 rectally. fundus firm @ umbilicus. Light to moderate vaginal bleeding present. s/p 1U prbs in OR with QBL of 1891cc. pt received TXA/ methergine/ 20u extra pit. BP's 130s/90s. \]PP tachycardia 130-140 consistently / o2 sat 89-91% on RA. pt denies CP/ SOB MEWS score 9. Shock index 1.23 urine output 75/hr temp 39.9 upon arrival to PACU orally.  A&O x4.  Most recent temp 39.4 rectally. fundus firm @ umbilicus. Light to moderate vaginal bleeding present. s/p 1U prbs in OR with QBL of 1891cc. pt received TXA/ methergine/ 20u extra pit. BP's 130s/90s.

## 2024-04-15 NOTE — CHART NOTE - NSCHARTNOTEFT_GEN_A_CORE
Assessed pt at bedside. Pt feeling improved from prior. Pt O2 96-98% on 2-4L of O2 via NC. Pt brought down to ED CT for CT Angio Chest for r/o PE in setting of desaturation & tachycardia. Will follow up results. Pts urine output low but increasing slowly. Anesthesia team to aid with obtaining stat labs.    Ashlie, PGY4 Assessed pt at bedside. Pt feeling improved from prior. Pt O2 96-98% on 2-4L of O2 via NC. Pt brought down to ED CT for CT Angio Chest for r/o PE in setting of desaturation & tachycardia. Will follow up results. Pts urine output low but increasing slowly. Anesthesia team to aid with obtaining stat labs.    VAISHNAVI DaileyY4    --------------------------------------  Addendum:  Assessed pt at bedside with MD Kelly. FAST scan performed, no evidence of FF surrounding uterus or in RUQ/LUQ. Normal lochia noted on pad. Prelim read of CT Angio Chest w/o evidence of PE. O2 saturation now >97% on 2L NC. Will wean as tolerated.  Discussed plan with patient to continue antibiotics, fluid resuscitation, & transfusion of another unit of pRBCs. Continue to monitor vital signs & will repeat labs s/p next unit of pRBCs.    VAISHNAVI DaileyY4  evaluated w/ Dr. Kelly,  Assessed pt at bedside. Pt feeling improved from prior. Pt O2 96-98% on 2-4L of O2 via NC. Pt brought down to ED CT for CT Angio Chest for r/o PE in setting of desaturation & tachycardia. Will follow up results. Pts urine output low but increasing slowly. Anesthesia team to aid with obtaining stat labs.    VAISHNAVI DaileyY4    --------------------------------------  Addendum:  Assessed pt at bedside with MD Kelly. FAST scan performed, no evidence of FF surrounding uterus or in RUQ/LUQ. Normal lochia noted on pad. Prelim read of CT Angio Chest w/o evidence of PE. O2 saturation now >97% on 2L NC. Will wean as tolerated.  6am labs resulted, H/H with expected drop from 10.1/32.2->8.8/28.6. PTT normalized (previous result likely erroneous).  Discussed plan with patient to continue antibiotics, fluid resuscitation, & transfusion of another unit of pRBCs. Continue to monitor vital signs & will repeat labs s/p next unit of pRBCs.    VAISHNAVI DaileyY4  evaluated w/ Dr. Kelly,  Assessed pt at bedside. Pt feeling improved from prior. Pt O2 96-98% on 2-4L of O2 via NC. Pt brought down to ED CT for CT Angio Chest for r/o PE in setting of desaturation & tachycardia. Will follow up results. Pts urine output low but increasing slowly. Anesthesia team to aid with obtaining stat labs.    VAISHNAVI DaileyY4    --------------------------------------  Addendum:  Assessed pt at bedside with MD Kelly. FAST scan performed, no evidence of FF surrounding uterus or in RUQ/LUQ. Normal lochia noted on pad. Prelim read of CT Angio Chest w/o evidence of PE. O2 saturation now >97% on 2L NC. Will wean as tolerated.  6am labs resulted, H/H with expected drop from 10.1/32.2->8.8/28.6. PTT normalized (previous result likely erroneous).  Discussed plan with patient to continue antibiotics, fluid resuscitation, & transfusion of another unit of pRBCs. Continue to monitor vital signs & will repeat labs s/p next unit of pRBCs.    Aslhie, PGY4  evaluated w/ Dr. Kelly,     Service attending    Patient seen and evaluated by me personally-  agree with above    Parmjit DWYER

## 2024-04-15 NOTE — OB POSTPARTUM EVENT NOTE - NS_EVENTFINDINGS1_OBGYN_ALL_OB_FT
RN concern pt meets criteria for rapid response. Upon bedside evaluation by no rapid response/ ICU consult warranted at this time as per MD. Pt febrile s/p chorio. 1000 IV tylenol, 2L o2/ Zosyn 4.5g/ 30 toradol IVP 2L o2 via nasal cannula. RN concern pt meets criteria for rapid response. Upon bedside evaluation by no rapid response/ ICU consult warranted at this time as per MD. Pt febrile s/p chorio. 1000 IV tylenol, 2L o2/ Zosyn 4.5g/ 30 toradol IVP 2L o2 via nasal cannula. incentive spirometry ordered at this time . RN concern pt meets criteria for rapid response. Upon bedside evaluation by no rapid response/ ICU consult warranted at this time as per MD. Pt febrile s/p chorio. 1000 IV tylenol, 2L o2/ Zosyn 4.5g/ 30 toradol IVP 2L o2 via nasal cannula. incentive spirometry ordered at this time . Repeat PP cbc @ 0200

## 2024-04-16 ENCOUNTER — TRANSCRIPTION ENCOUNTER (OUTPATIENT)
Age: 39
End: 2024-04-16

## 2024-04-16 DIAGNOSIS — O41.1290 CHORIOAMNIONITIS, UNSPECIFIED TRIMESTER, NOT APPLICABLE OR UNSPECIFIED: ICD-10-CM

## 2024-04-16 LAB
BASOPHILS # BLD AUTO: 0.03 K/UL — SIGNIFICANT CHANGE UP (ref 0–0.2)
BASOPHILS # BLD AUTO: 0.04 K/UL — SIGNIFICANT CHANGE UP (ref 0–0.2)
BASOPHILS NFR BLD AUTO: 0.3 % — SIGNIFICANT CHANGE UP (ref 0–2)
BASOPHILS NFR BLD AUTO: 0.4 % — SIGNIFICANT CHANGE UP (ref 0–2)
CULTURE RESULTS: NO GROWTH — SIGNIFICANT CHANGE UP
EOSINOPHIL # BLD AUTO: 0.07 K/UL — SIGNIFICANT CHANGE UP (ref 0–0.5)
EOSINOPHIL # BLD AUTO: 0.11 K/UL — SIGNIFICANT CHANGE UP (ref 0–0.5)
EOSINOPHIL NFR BLD AUTO: 0.6 % — SIGNIFICANT CHANGE UP (ref 0–6)
EOSINOPHIL NFR BLD AUTO: 1.1 % — SIGNIFICANT CHANGE UP (ref 0–6)
HCT VFR BLD CALC: 26.8 % — LOW (ref 34.5–45)
HCT VFR BLD CALC: 29.4 % — LOW (ref 34.5–45)
HGB BLD-MCNC: 8.9 G/DL — LOW (ref 11.5–15.5)
HGB BLD-MCNC: 9.5 G/DL — LOW (ref 11.5–15.5)
IANC: 7.58 K/UL — HIGH (ref 1.8–7.4)
IANC: 9.38 K/UL — HIGH (ref 1.8–7.4)
IMM GRANULOCYTES NFR BLD AUTO: 0.9 % — SIGNIFICANT CHANGE UP (ref 0–0.9)
IMM GRANULOCYTES NFR BLD AUTO: 1 % — HIGH (ref 0–0.9)
LYMPHOCYTES # BLD AUTO: 0.96 K/UL — LOW (ref 1–3.3)
LYMPHOCYTES # BLD AUTO: 1.66 K/UL — SIGNIFICANT CHANGE UP (ref 1–3.3)
LYMPHOCYTES # BLD AUTO: 16.1 % — SIGNIFICANT CHANGE UP (ref 13–44)
LYMPHOCYTES # BLD AUTO: 8.5 % — LOW (ref 13–44)
MCHC RBC-ENTMCNC: 26 PG — LOW (ref 27–34)
MCHC RBC-ENTMCNC: 26.1 PG — LOW (ref 27–34)
MCHC RBC-ENTMCNC: 32.3 GM/DL — SIGNIFICANT CHANGE UP (ref 32–36)
MCHC RBC-ENTMCNC: 33.2 GM/DL — SIGNIFICANT CHANGE UP (ref 32–36)
MCV RBC AUTO: 78.4 FL — LOW (ref 80–100)
MCV RBC AUTO: 80.8 FL — SIGNIFICANT CHANGE UP (ref 80–100)
MONOCYTES # BLD AUTO: 0.78 K/UL — SIGNIFICANT CHANGE UP (ref 0–0.9)
MONOCYTES # BLD AUTO: 0.8 K/UL — SIGNIFICANT CHANGE UP (ref 0–0.9)
MONOCYTES NFR BLD AUTO: 6.9 % — SIGNIFICANT CHANGE UP (ref 2–14)
MONOCYTES NFR BLD AUTO: 7.8 % — SIGNIFICANT CHANGE UP (ref 2–14)
NEUTROPHILS # BLD AUTO: 7.58 K/UL — HIGH (ref 1.8–7.4)
NEUTROPHILS # BLD AUTO: 9.38 K/UL — HIGH (ref 1.8–7.4)
NEUTROPHILS NFR BLD AUTO: 73.6 % — SIGNIFICANT CHANGE UP (ref 43–77)
NEUTROPHILS NFR BLD AUTO: 82.8 % — HIGH (ref 43–77)
NRBC # BLD: 0 /100 WBCS — SIGNIFICANT CHANGE UP (ref 0–0)
NRBC # BLD: 0 /100 WBCS — SIGNIFICANT CHANGE UP (ref 0–0)
NRBC # FLD: 0 K/UL — SIGNIFICANT CHANGE UP (ref 0–0)
NRBC # FLD: 0 K/UL — SIGNIFICANT CHANGE UP (ref 0–0)
PLATELET # BLD AUTO: 210 K/UL — SIGNIFICANT CHANGE UP (ref 150–400)
PLATELET # BLD AUTO: 225 K/UL — SIGNIFICANT CHANGE UP (ref 150–400)
RBC # BLD: 3.42 M/UL — LOW (ref 3.8–5.2)
RBC # BLD: 3.64 M/UL — LOW (ref 3.8–5.2)
RBC # FLD: 16.8 % — HIGH (ref 10.3–14.5)
RBC # FLD: 17.1 % — HIGH (ref 10.3–14.5)
SPECIMEN SOURCE: SIGNIFICANT CHANGE UP
WBC # BLD: 10.29 K/UL — SIGNIFICANT CHANGE UP (ref 3.8–10.5)
WBC # BLD: 11.32 K/UL — HIGH (ref 3.8–10.5)
WBC # FLD AUTO: 10.29 K/UL — SIGNIFICANT CHANGE UP (ref 3.8–10.5)
WBC # FLD AUTO: 11.32 K/UL — HIGH (ref 3.8–10.5)

## 2024-04-16 RX ORDER — ASPIRIN/CALCIUM CARB/MAGNESIUM 324 MG
1 TABLET ORAL
Refills: 0 | DISCHARGE

## 2024-04-16 RX ORDER — FERROUS SULFATE 325(65) MG
1 TABLET ORAL
Qty: 0 | Refills: 0 | DISCHARGE
Start: 2024-04-16

## 2024-04-16 RX ORDER — IBUPROFEN 200 MG
600 TABLET ORAL EVERY 6 HOURS
Refills: 0 | Status: DISCONTINUED | OUTPATIENT
Start: 2024-04-16 | End: 2024-04-18

## 2024-04-16 RX ORDER — ACETAMINOPHEN 500 MG
3 TABLET ORAL
Qty: 0 | Refills: 0 | DISCHARGE
Start: 2024-04-16

## 2024-04-16 RX ORDER — IBUPROFEN 200 MG
1 TABLET ORAL
Qty: 0 | Refills: 0 | DISCHARGE
Start: 2024-04-16

## 2024-04-16 RX ORDER — ASCORBIC ACID 60 MG
1 TABLET,CHEWABLE ORAL
Qty: 0 | Refills: 0 | DISCHARGE
Start: 2024-04-16

## 2024-04-16 RX ADMIN — OXYCODONE HYDROCHLORIDE 5 MILLIGRAM(S): 5 TABLET ORAL at 12:15

## 2024-04-16 RX ADMIN — Medication 975 MILLIGRAM(S): at 09:17

## 2024-04-16 RX ADMIN — PIPERACILLIN AND TAZOBACTAM 200 GRAM(S): 4; .5 INJECTION, POWDER, LYOPHILIZED, FOR SOLUTION INTRAVENOUS at 00:31

## 2024-04-16 RX ADMIN — Medication 30 MILLIGRAM(S): at 00:30

## 2024-04-16 RX ADMIN — OXYCODONE HYDROCHLORIDE 5 MILLIGRAM(S): 5 TABLET ORAL at 14:10

## 2024-04-16 RX ADMIN — Medication 975 MILLIGRAM(S): at 15:00

## 2024-04-16 RX ADMIN — OXYCODONE HYDROCHLORIDE 5 MILLIGRAM(S): 5 TABLET ORAL at 02:44

## 2024-04-16 RX ADMIN — Medication 325 MILLIGRAM(S): at 14:10

## 2024-04-16 RX ADMIN — Medication 600 MILLIGRAM(S): at 05:52

## 2024-04-16 RX ADMIN — OXYCODONE HYDROCHLORIDE 5 MILLIGRAM(S): 5 TABLET ORAL at 15:00

## 2024-04-16 RX ADMIN — Medication 600 MILLIGRAM(S): at 23:35

## 2024-04-16 RX ADMIN — Medication 30 MILLIGRAM(S): at 01:00

## 2024-04-16 RX ADMIN — Medication 975 MILLIGRAM(S): at 14:10

## 2024-04-16 RX ADMIN — OXYCODONE HYDROCHLORIDE 5 MILLIGRAM(S): 5 TABLET ORAL at 03:15

## 2024-04-16 RX ADMIN — Medication 600 MILLIGRAM(S): at 18:45

## 2024-04-16 RX ADMIN — HEPARIN SODIUM 10000 UNIT(S): 5000 INJECTION INTRAVENOUS; SUBCUTANEOUS at 18:03

## 2024-04-16 RX ADMIN — Medication 600 MILLIGRAM(S): at 18:02

## 2024-04-16 RX ADMIN — OXYCODONE HYDROCHLORIDE 5 MILLIGRAM(S): 5 TABLET ORAL at 11:29

## 2024-04-16 RX ADMIN — Medication 600 MILLIGRAM(S): at 12:15

## 2024-04-16 RX ADMIN — Medication 600 MILLIGRAM(S): at 11:29

## 2024-04-16 RX ADMIN — OXYCODONE HYDROCHLORIDE 5 MILLIGRAM(S): 5 TABLET ORAL at 18:02

## 2024-04-16 RX ADMIN — Medication 325 MILLIGRAM(S): at 06:05

## 2024-04-16 RX ADMIN — SENNA PLUS 2 TABLET(S): 8.6 TABLET ORAL at 23:41

## 2024-04-16 RX ADMIN — OXYCODONE HYDROCHLORIDE 5 MILLIGRAM(S): 5 TABLET ORAL at 18:40

## 2024-04-16 RX ADMIN — Medication 500 MILLIGRAM(S): at 11:29

## 2024-04-16 RX ADMIN — Medication 325 MILLIGRAM(S): at 23:37

## 2024-04-16 RX ADMIN — HEPARIN SODIUM 10000 UNIT(S): 5000 INJECTION INTRAVENOUS; SUBCUTANEOUS at 05:52

## 2024-04-16 RX ADMIN — Medication 600 MILLIGRAM(S): at 06:27

## 2024-04-16 RX ADMIN — Medication 975 MILLIGRAM(S): at 08:25

## 2024-04-16 NOTE — PROGRESS NOTE ADULT - ASSESSMENT
37yo  POD#2 pLTCS for Cat 2 tracing, intrapartum course complicated by Chorio. Delivery complicated by PPH (EBL 1891) for bleeding sinuses. Postpartum course complicated by RRT called for tachycardia, desaturations to 88%, and endometritis with a shock index of 1.23. Pt was fluid resuscitated with LR and 2u pRBCs yesterday with good improvement in symptoms. Abdominal pain continued throughout the day, more on the left than right, prompting workup with CTA Abd/Pelv that was negative.     #PPH, s/p RRT  - Pt with adequate UOP, no longer tachycardic, with O2 sat >99%   - s/p TXA, extra pitocin, IM methergine, & transfusion 1u pRBCs intraop with 2u pRBCs postpartum    - 33.1->(1u PRBC)->37.7->32.2->28.6->1u PRBC->26.8->1u pRBC->27.4->26.8  - PTT >200 with 3AM labs, possibly from heparinized syringe, 6am PTT 23.2  - Pt was tachycardic to the 130s-140s, desaturation to 88%, low urine output   - Lactate 2.9->2.6->1.0  - CTA AP (4/15): Small endometrial hematoma. No active hemorrhage. Two indeterminate nodular enhancement along endometrial surface of fundus - myometrium vs retained POC  - CTA PE (4/15): negative   - No current signs/sxs of acute anemia  - Cont to monitor     #Chorio, endometritis  - Pt last febrile 4/15 @135a to 38C  - Blood cx, urine cx, placental cx sent  - On zosyn until 24H afebrile  - WBC 9.72->11.55->11.34->10.77->9.7->9.9    #Postpartum state  - Continue with po analgesia  - Increase ambulation  - Continue regular diet  - DVT prophylaxis with [id number] heparin  - Encourage incentive spirometry    Ramya Verdin PGY-1   37yo  POD#2 pLTCS for Cat 2 tracing, intrapartum course complicated by Chorio. Delivery complicated by PPH (EBL 1891) for bleeding sinuses. Postpartum course complicated by RRT called for tachycardia, desaturations to 88%, and endometritis with a shock index of 1.23. Pt was fluid resuscitated with LR and 2u pRBCs yesterday with good improvement in symptoms. Abdominal pain continued throughout the day, more on the left than right, prompting workup with CTA Abd/Pelv that was negative - now resolved.     #PPH, s/p RRT  - Pt with adequate UOP, no longer tachycardic, with O2 sat >99%   - s/p TXA, extra pitocin, IM methergine, & transfusion 1u pRBCs intraop with 2u pRBCs postpartum    - On LR@75, to be d/c'ed   - 33.1->(1u PRBC)->37.7->32.2->28.6->1u PRBC->26.8->1u pRBC->27.4->26.8  - PTT >200 with 3AM labs, possibly from heparinized syringe, 6am PTT 23.2  - Pt was tachycardic to the 130s-140s, desaturation to 88%, low urine output   - Lactate 2.9->2.6->1.0  - CTA AP (4/15): Small endometrial hematoma. No active hemorrhage. Two indeterminate nodular enhancement along endometrial surface of fundus - myometrium vs retained POC  - CTA PE (4/15): negative   - No current signs/sxs of acute anemia  - Cont to monitor     #Chorio, endometritis  - Pt last febrile 4/15 @135a to 38C  - Blood cx, urine cx, placental cx sent  - On zosyn until 24H afebrile  - WBC 9.72->11.55->11.34->10.77->9.7->9.9    #Postpartum state  - Continue with po analgesia  - Increase ambulation  - Continue regular diet  - DVT prophylaxis with [id number] heparin  - Encourage incentive spirometry    Ramya Verdin PGY-1   37yo  POD#2 pLTCS for Cat 2 tracing, intrapartum course complicated by Chorio. Delivery complicated by PPH (EBL 1891) for bleeding sinuses. Postpartum course complicated by RRT called for tachycardia, desaturations to 88%, and endometritis with a shock index of 1.23. Pt was fluid resuscitated with LR and 2u pRBCs yesterday with good improvement in symptoms. Abdominal pain continued throughout the day, more on the left than right, prompting workup with CTA Abd/Pelv that was negative - now resolved.     #PPH, s/p RRT  - Pt with adequate UOP, no longer tachycardic, with O2 sat >99%   - s/p TXA, extra pitocin, IM methergine, & transfusion 1u pRBCs intraop with 2u pRBCs postpartum    - On LR@75, to be d/c'ed   - 33.1->(1u PRBC)->37.7->32.2->28.6->1u PRBC->26.8->1u pRBC->27.4->26.8  - PTT >200 with 3AM labs, possibly from heparinized syringe, 6am PTT 23.2  - Pt was tachycardic to the 130s-140s, desaturation to 88%, low urine output   - Lactate 2.9->2.6->1.0  - CTA AP (4/15): Small endometrial hematoma. No active hemorrhage. Two indeterminate nodular enhancement along endometrial surface of fundus - myometrium vs retained POC  - CTA PE (4/15): negative   - No current signs/sxs of acute anemia  - Cont to monitor     #Chorio, endometritis  - Pt last febrile 4/15 @135a to 38C  - Blood cx, urine cx, placental cx sent  - S/p zosyn as she is over 24H afebrile  - WBC 9.72->11.55->11.34->10.77->9.7->9.9    #Postpartum state  - Continue with po analgesia  - Increase ambulation  - Continue regular diet  - DVT prophylaxis with 72180n heparin  - Encourage incentive spirometry    Ramya Verdin PGY-1

## 2024-04-16 NOTE — DISCHARGE NOTE OB - PLAN OF CARE
You received 3 units of packed red blood cells while in the hospital for increased bleeding during your surgery. Continue taking iron, vitamin c, and prenatal vitamins. Follow up in [facility] as instructed. If you have any new-onset chest pain, shortness of breath, dizziness, or palpitations, call the clinic. Make your follow-up appointment with your doctor as ordered. No heavy lifting, driving, or strenuous activity for 6 weeks. Nothing per vagina such as tampons, intercourse, douches or tub baths for 6 weeks or until you see your doctor. Call your doctor with any signs and symptoms of infection such as fever, chills, nausea or vomiting. Call your doctor with redness or swelling at the incision site, fluid leakage or wound separation. Call your doctor if you’re unable to tolerate food, increase in vaginal bleeding, or have difficulty urinating. Call your doctor if you have pain that is not relieved by your prescribed medications. Notify your doctor with any other concerns. Call [phone] if you have any of these concerns in the next 6 weeks.

## 2024-04-16 NOTE — DISCHARGE NOTE OB - MEDICATION SUMMARY - MEDICATIONS TO STOP TAKING
I will STOP taking the medications listed below when I get home from the hospital:    aspirin 81 mg oral tablet  -- 1 by mouth 2 times a day

## 2024-04-16 NOTE — DISCHARGE NOTE OB - PROVIDER TOKENS
FREE:[LAST:[[facility]],FIRST:[Clinic],PHONE:[phone]],FAX:[(   [phone]    -],ADDRESS:[[phone] [place] [address], [address] [place] [address]]]

## 2024-04-16 NOTE — DISCHARGE NOTE OB - CARE PROVIDER_API CALL
[facility], [facility]  [phone] [place] [address], [address] [place] [address]  Phone: [phone]  Fax: (   )    -  Follow Up Time:

## 2024-04-16 NOTE — PROGRESS NOTE ADULT - ATTENDING COMMENTS
Associate Chief of L & D ( late entry)      OB Progress Note:  Delivery, POD#2    S: 37yo POD#2 s/p LTCS . Patient re[orts feeling better     O:   Vital Signs Last 24 Hrs  T(C): 36.6 (2024 09:19), Max: 36.8 (15 Apr 2024 22:40)  T(F): 97.9 (2024 09:19), Max: 98.2 (15 Apr 2024 22:40)  HR: 97 (2024 09:19) (93 - 132)  BP: 117/62 (2024 09:19) (95/53 - 122/62)  BP(mean): 76 (15 Apr 2024 17:00) (60 - 77)  RR: 18 (:19) (15 - 31)  SpO2: 99% (:19) (94% - 100%)    Parameters below as of 2024 09:19  Patient On (Oxygen Delivery Method): room air          Labs:  Blood type: O Positive  Rubella IgG: RPR: Negative  LABS:                        9.5    10.29 )-----------( 225      ( 2024 06:15 )             29.4                           8.5<L>   9.70 >-----------< 189    ( 04-15 @ 10:25 )             26.8<L>                        8.8<L>   10.77<H> >-----------< 220    ( 04-15 @ 06:00 )             28.6<L>                        10.1<L>   11.34<H> >-----------< 233    ( 04-15 @ 02:02 )             32.2<L>                        10.8<L>   11.55<H> >-----------< 146<L>    (  @ 22:55 )             37.7                        10.2<L>   9.72 >-----------< 298    (  @ 17:10 )             33.1<L>    04-15-24 @ 10:25  136  |  106  |  9   ----------------------------<  130<H>  3.8   |  18<L>  |  0.73    04-15-24 @ 06:00  138  |  107  |  7   ----------------------------<  95  4.3   |  17<L>  |  0.62    04-15-24 @ 02:02  134<L>  |  105  |  6<L>  ----------------------------<  107<H>  3.7   |  16<L>  |  0.49<L>    lactate 2.9 --->  2.6----> 1.0    CT small clot in the endometrial lining    PE:  Abdomen: soft, Mildly distended, tender 6/10,   incision c/d/i.  Lochia scant   Extremities: No erythema, +2 edema    A/P: 37yo POD#2 s/p LTCS. due to CAT II arrest of dilatation and chorioamnionitis and suspected sepsis via criteria and  acute on chronic anemia due to PPH with qbl 1841 cc  s/p 3 units PRBC's    - Continue regular diet.  - Increase ambulation.  - Continue Motrin, Tylenol, oxycodone PRN for pain control.  vs. continue PCEA for pain.  - AM CBC stable  - Zosyn completed    Mi Yeung M.D., M.B.A., M.S. Associate Chief of L & D ( late entry)      OB Progress Note:  Delivery, POD#2    S: 39yo POD#2 s/p LTCS . Patient re[orts feeling better     O:   Vital Signs Last 24 Hrs  T(C): 36.6 (2024 09:19), Max: 36.8 (15 Apr 2024 22:40)  T(F): 97.9 (2024 09:19), Max: 98.2 (15 Apr 2024 22:40)  HR: 97 (2024 09:19) (93 - 132)  BP: 117/62 (2024 09:19) (95/53 - 122/62)  BP(mean): 76 (15 Apr 2024 17:00) (60 - 77)  RR: 18 (:19) (15 - 31)  SpO2: 99% (:19) (94% - 100%)    Parameters below as of 2024 09:19  Patient On (Oxygen Delivery Method): room air          Labs:  Blood type: O Positive  Rubella IgG: RPR: Negative  LABS:                        9.5    10.29 )-----------( 225      ( 2024 06:15 )             29.4                           8.5<L>   9.70 >-----------< 189    ( 04-15 @ 10:25 )             26.8<L>                        8.8<L>   10.77<H> >-----------< 220    ( 04-15 @ 06:00 )             28.6<L>                        10.1<L>   11.34<H> >-----------< 233    ( 04-15 @ 02:02 )             32.2<L>                        10.8<L>   11.55<H> >-----------< 146<L>    (  @ 22:55 )             37.7                        10.2<L>   9.72 >-----------< 298    (  @ 17:10 )             33.1<L>    04-15-24 @ 10:25  136  |  106  |  9   ----------------------------<  130<H>  3.8   |  18<L>  |  0.73    04-15-24 @ 06:00  138  |  107  |  7   ----------------------------<  95  4.3   |  17<L>  |  0.62    04-15-24 @ 02:02  134<L>  |  105  |  6<L>  ----------------------------<  107<H>  3.7   |  16<L>  |  0.49<L>    lactate 2.9 --->  2.6----> 1.0    CT small clot in the endometrial lining    PE:  Abdomen: soft, Mildly distended, tender 10,   incision c/d/i.  Lochia scant   Extremities: No erythema, +2 edema    A/P: 39yo POD#2 s/p LTCS. due to CAT II arrest of dilatation and chorioamnionitis/ endometritis, suspected sepsis via criteria markedly improved  and  acute on chronic anemia due to PPH with qbl 1841 cc  s/p 3 units PRBC's    - Continue regular diet.  - Increase ambulation.  - Continue Motrin, Tylenol, oxycodone PRN for pain control.  vs. continue PCEA for pain.  - AM CBC stable  - Zosyn completed    Mi Yeung M.D., M.B.A., M.S.

## 2024-04-16 NOTE — DISCHARGE NOTE OB - MEDICATION SUMMARY - MEDICATIONS TO TAKE
I will START or STAY ON the medications listed below when I get home from the hospital:    ibuprofen 600 mg oral tablet  -- 1 tab(s) by mouth every 6 hours  -- Indication: For Postpartum    acetaminophen 325 mg oral tablet  -- 3 tab(s) by mouth every 6 hours  -- Indication: For Postpartum    PNV Prenatal oral tablet  -- 1 tab(s) by mouth once a day  -- Indication: For Postpartum    ferrous sulfate 325 mg (65 mg elemental iron) oral tablet  -- 1 tab(s) by mouth 3 times a day  -- Indication: For anemia     ascorbic acid 500 mg oral tablet  -- 1 tab(s) by mouth once a day  -- Indication: For anemia

## 2024-04-16 NOTE — PROGRESS NOTE ADULT - SUBJECTIVE AND OBJECTIVE BOX
R1 Progress Note    POD#2 pLTCS c/b chorio, PPH, endometritis. Patient seen and examined at bedside, no acute overnight events. No acute complaints, pain well controlled. Patient is ambulating, voiding, and tolerating regular diet. Passing flatus. Denies CP, SOB, N/V, HA, blurred vision, epigastric pain.    Vital Signs Last 24 Hours  T(C): 36.6 (04-16-24 @ 02:33), Max: 37.8 (04-15-24 @ 07:40)  HR: 95 (04-16-24 @ 02:33) (95 - 132)  BP: 101/60 (04-16-24 @ 02:33) (82/69 - 133/84)  RR: 19 (04-16-24 @ 02:33) (15 - 31)  SpO2: 100% (04-16-24 @ 02:33) (94% - 100%)    I&O's Summary    14 Apr 2024 07:01  -  15 Apr 2024 07:00  --------------------------------------------------------  IN: 7937.5 mL / OUT: 3830 mL / NET: 4107.5 mL    15 Apr 2024 07:01  -  16 Apr 2024 06:09  --------------------------------------------------------  IN: 5150 mL / OUT: 1850 mL / NET: 3300 mL        Physical Exam:  General: NAD  Abdomen: Soft, non-tender, non-distended, fundus firm  Incision: Pfannenstiel incision CDI, subcuticular suture closure  Pelvic: Lochia wnl    Labs:    Blood Type: O Positive  Antibody Screen: Negative  RPR: Negative               8.9    11.32 )-----------( 210      ( 04-16 @ 00:35 )             26.8                8.9    9.88  )-----------( 193      ( 04-15 @ 16:35 )             27.4                8.5    9.70  )-----------( 189      ( 04-15 @ 10:25 )             26.8         MEDICATIONS  (STANDING):  acetaminophen     Tablet .. 975 milliGRAM(s) Oral <User Schedule>  ascorbic acid 500 milliGRAM(s) Oral daily  diphtheria/tetanus/pertussis (acellular) Vaccine (Adacel) 0.5 milliLiter(s) IntraMuscular once  ferrous    sulfate 325 milliGRAM(s) Oral three times a day  heparin   Injectable 77616 Unit(s) SubCutaneous every 12 hours  ibuprofen  Tablet. 600 milliGRAM(s) Oral every 6 hours  lactated ringers. 1000 milliLiter(s) (75 mL/Hr) IV Continuous <Continuous>  piperacillin/tazobactam IVPB.. 4.5 Gram(s) IV Intermittent every 8 hours  senna 2 Tablet(s) Oral at bedtime    MEDICATIONS  (PRN):  diphenhydrAMINE 25 milliGRAM(s) Oral every 6 hours PRN Pruritus  lanolin Ointment 1 Application(s) Topical every 6 hours PRN Sore Nipples  magnesium hydroxide Suspension 30 milliLiter(s) Oral two times a day PRN Constipation  oxyCODONE    IR 5 milliGRAM(s) Oral once PRN Moderate to Severe Pain (4-10)  oxyCODONE    IR 5 milliGRAM(s) Oral every 3 hours PRN Moderate to Severe Pain (4-10)  simethicone 80 milliGRAM(s) Chew every 4 hours PRN Gas   R1 Progress Note    POD#2 pLTCS c/b chorio, PPH, endometritis. Patient seen and examined at bedside, no acute overnight events. No acute complaints, pain well controlled. Patient is ambulating, voiding, and tolerating regular diet. Passing flatus. Denies CP, SOB, N/V, HA, blurred vision, epigastric pain.    Vital Signs Last 24 Hours  T(C): 36.6 (04-16-24 @ 02:33), Max: 37.8 (04-15-24 @ 07:40)  HR: 95 (04-16-24 @ 02:33) (95 - 132)  BP: 101/60 (04-16-24 @ 02:33) (82/69 - 133/84)  RR: 19 (04-16-24 @ 02:33) (15 - 31)  SpO2: 100% (04-16-24 @ 02:33) (94% - 100%)    I&O's Summary    14 Apr 2024 07:01  -  15 Apr 2024 07:00  --------------------------------------------------------  IN: 7937.5 mL / OUT: 3830 mL / NET: 4107.5 mL    15 Apr 2024 07:01  -  16 Apr 2024 06:09  --------------------------------------------------------  IN: 5150 mL / OUT: 1850 mL / NET: 3300 mL        Physical Exam:  General: NAD  Abdomen: Soft, non-tender, non-distended, fundus firm  Incision: Pfannenstiel incision CDI, subcuticular suture closure, aquacel dressing   Pelvic: Lochia wnl    Labs:    Blood Type: O Positive  Antibody Screen: Negative  RPR: Negative               8.9    11.32 )-----------( 210      ( 04-16 @ 00:35 )             26.8                8.9    9.88  )-----------( 193      ( 04-15 @ 16:35 )             27.4                8.5    9.70  )-----------( 189      ( 04-15 @ 10:25 )             26.8         MEDICATIONS  (STANDING):  acetaminophen     Tablet .. 975 milliGRAM(s) Oral <User Schedule>  ascorbic acid 500 milliGRAM(s) Oral daily  diphtheria/tetanus/pertussis (acellular) Vaccine (Adacel) 0.5 milliLiter(s) IntraMuscular once  ferrous    sulfate 325 milliGRAM(s) Oral three times a day  heparin   Injectable 82804 Unit(s) SubCutaneous every 12 hours  ibuprofen  Tablet. 600 milliGRAM(s) Oral every 6 hours  lactated ringers. 1000 milliLiter(s) (75 mL/Hr) IV Continuous <Continuous>  piperacillin/tazobactam IVPB.. 4.5 Gram(s) IV Intermittent every 8 hours  senna 2 Tablet(s) Oral at bedtime    MEDICATIONS  (PRN):  diphenhydrAMINE 25 milliGRAM(s) Oral every 6 hours PRN Pruritus  lanolin Ointment 1 Application(s) Topical every 6 hours PRN Sore Nipples  magnesium hydroxide Suspension 30 milliLiter(s) Oral two times a day PRN Constipation  oxyCODONE    IR 5 milliGRAM(s) Oral once PRN Moderate to Severe Pain (4-10)  oxyCODONE    IR 5 milliGRAM(s) Oral every 3 hours PRN Moderate to Severe Pain (4-10)  simethicone 80 milliGRAM(s) Chew every 4 hours PRN Gas

## 2024-04-16 NOTE — DISCHARGE NOTE OB - HOSPITAL COURSE
Patient underwent an primary LTCS for category 2 fetal heart tracing. Intrapartum course complicated by chorioamnionitis, for which patient was given zosyn.  section complicated by high EBL 1891mL from bleeding sinuses with one unit of blood transfused intraoperatively, H/H as below. Patient’s postoperative course was notable for postpartum temperature with endometrial tenderness consistent with endometritis. Patient became septic in the PACU and had a rapid response called for fever, tachycardia, hypotension, low urine output, and desaturation to 88%. Acute blood loss anemia also contributed to her high shock index. Fluid resuscitation and 2 units pack red blood cells were given in the PACU. Patient had a CTA PE done to rule out pulmonary embolism - the study was negative. Patient also had CTA AP done to rule out active abdominal bleed in the setting of uneven abdominal pain, more on the left than right, which also showed no active bleeding but a small 2cm hematoma.     Upon discharge on POD3, the patient is ambulating and voiding spontaneously, tolerating oral intake, pain was well controlled with oral medication, and vital signs were stable. Patient underwent an primary LTCS for category 2 fetal heart tracing. Intrapartum course complicated by chorioamnionitis, for which patient was given zosyn.  section complicated by high EBL 1891mL from bleeding sinuses with one unit of blood transfused intraoperatively, H/H as below. Patient’s postoperative course was notable for postpartum temperature with endometrial tenderness consistent with endometritis. Patient became septic in the PACU and had a rapid response called for fever, tachycardia, hypotension, low urine output, and desaturation to 88%. Acute blood loss anemia also contributed to her high shock index. Fluid resuscitation and 2 units pack red blood cells were given in the PACU. Patient had a CTA PE done to rule out pulmonary embolism - the study was negative. Patient also had CTA AP done to rule out active abdominal bleed in the setting of uneven abdominal pain, more on the left than right, which also showed no active bleeding but a small 2cm hematoma. Patient progressed well in the postpartum state and remained stable.    Hct: 33.1->1u PRBC->37.7->32.2->28.6->1u PRBC->26.8->1u pRBC->27.4->26.8->29.4    Upon discharge on POD4, the patient is ambulating and voiding spontaneously, tolerating oral intake, pain was well controlled with oral medication, and vital signs were stable.

## 2024-04-16 NOTE — DISCHARGE NOTE OB - HOME CARE AGENCY TYPE OF SERVICE:
RN to see patient on 4/19/2024 for mother/baby bonding pending insurance authorization and agency acceptance. RN to see patient on 4/19/2024 for mother/baby.

## 2024-04-16 NOTE — DISCHARGE NOTE OB - ADDITIONAL INSTRUCTIONS
Instructions:  Make your follow-up appointment with your doctor as ordered. No heavy lifting, driving, or strenuous activity for 6 weeks.   Nothing per vagina such as tampons, intercourse, douches or tub baths for 6 weeks or until you see your doctor.   Call your doctor with any signs and symptoms of infection such as fever, chills, nausea or vomiting. Call your doctor with redness or swelling at the incision site, fluid leakage or wound separation. Call your doctor if you're unable to tolerate food, increase in vaginal bleeding, or have difficulty urinating. Call your doctor if you have pain that is not relieved by your prescribed medications. Notify your doctor with any of concerns.    Follow up:  [facility]  Please f/u in 2 weeks for an incision check and for a postpartum appointment in 4-6 weeks @[facility], [facility], Oncology Building, basement floor. Please call the office for an appointment ([phone]) Instructions:  Make your follow-up appointment with your doctor as ordered. No heavy lifting, driving, or strenuous activity for 6 weeks.   Nothing per vagina such as tampons, intercourse, douches or tub baths for 6 weeks or until you see your doctor.   Call your doctor with any signs and symptoms of infection such as fever, chills, nausea or vomiting. Call your doctor with redness or swelling at the incision site, fluid leakage or wound separation. Call your doctor if you're unable to tolerate food, increase in vaginal bleeding, or have difficulty urinating. Call your doctor if you have pain that is not relieved by your prescribed medications. Notify your doctor with any of concerns.    Follow up:  [facility]  Please f/u in 2 weeks for an incision check and for a postpartum appointment in 4-6 weeks @[facility], [facility], Oncology Building, basement floor. Please call the office for an appointment ([phone])    Home with home care

## 2024-04-16 NOTE — DISCHARGE NOTE OB - FINDINGS/TREATMENT
Given 1 unit pRBCs intraop and 2 units pRBCs postop for acute blood loss anemia.  section for category 2 tracing complicated by postpartum hemorrhage from bleeding sinuses, given 1 unit pRBCs intraop and 2 units pRBCs postop.

## 2024-04-16 NOTE — DISCHARGE NOTE OB - CALL YOUR HEALTHCARE PROVIDER IF YOU ARE EXPERIENCING SYMPTOMS OF DEPRESSION THAT LAST MORE THAN THREE DAYS
----- Message from Yamilet Ball sent at 4/6/2022  8:17 AM CDT -----  .Type:  Patient Returning Call    Who Called:ALEXANDRA MCCARTNEY [[id number]]  Who Left Message for Patient: nurse   Does the patient know what this is regarding?: test results   Would the patient rather a call back or a response via [facility]? Call   Best Call Back Number: [phone]  Additional Information:         Statement Selected

## 2024-04-16 NOTE — DISCHARGE NOTE OB - OTHER SIGNIFICANT FINDINGS
Patient was septic postpartum, with tachycardia, oxygen desaturations, hypotension, and fever. Patient given zosyn and aggressive fluid resuscitation with good response.

## 2024-04-16 NOTE — DISCHARGE NOTE OB - CARE PLAN
Principal Discharge DX:	 delivery delivered  Assessment and plan of treatment:	Make your follow-up appointment with your doctor as ordered. No heavy lifting, driving, or strenuous activity for 6 weeks. Nothing per vagina such as tampons, intercourse, douches or tub baths for 6 weeks or until you see your doctor. Call your doctor with any signs and symptoms of infection such as fever, chills, nausea or vomiting. Call your doctor with redness or swelling at the incision site, fluid leakage or wound separation. Call your doctor if you’re unable to tolerate food, increase in vaginal bleeding, or have difficulty urinating. Call your doctor if you have pain that is not relieved by your prescribed medications. Notify your doctor with any other concerns. Call [phone] if you have any of these concerns in the next 6 weeks.  Secondary Diagnosis:	Postpartum hemorrhage  Assessment and plan of treatment:	You received 3 units of packed red blood cells while in the hospital for increased bleeding during your surgery. Continue taking iron, vitamin c, and prenatal vitamins. Follow up in [facility] as instructed. If you have any new-onset chest pain, shortness of breath, dizziness, or palpitations, call the clinic.   1

## 2024-04-17 RX ADMIN — Medication 600 MILLIGRAM(S): at 18:19

## 2024-04-17 RX ADMIN — Medication 325 MILLIGRAM(S): at 21:13

## 2024-04-17 RX ADMIN — Medication 975 MILLIGRAM(S): at 14:44

## 2024-04-17 RX ADMIN — Medication 975 MILLIGRAM(S): at 03:24

## 2024-04-17 RX ADMIN — Medication 600 MILLIGRAM(S): at 05:57

## 2024-04-17 RX ADMIN — Medication 975 MILLIGRAM(S): at 15:40

## 2024-04-17 RX ADMIN — Medication 975 MILLIGRAM(S): at 21:13

## 2024-04-17 RX ADMIN — Medication 975 MILLIGRAM(S): at 08:26

## 2024-04-17 RX ADMIN — Medication 600 MILLIGRAM(S): at 12:30

## 2024-04-17 RX ADMIN — Medication 600 MILLIGRAM(S): at 11:47

## 2024-04-17 RX ADMIN — Medication 600 MILLIGRAM(S): at 05:27

## 2024-04-17 RX ADMIN — Medication 600 MILLIGRAM(S): at 00:05

## 2024-04-17 RX ADMIN — HEPARIN SODIUM 10000 UNIT(S): 5000 INJECTION INTRAVENOUS; SUBCUTANEOUS at 17:33

## 2024-04-17 RX ADMIN — Medication 325 MILLIGRAM(S): at 14:43

## 2024-04-17 RX ADMIN — Medication 975 MILLIGRAM(S): at 09:08

## 2024-04-17 RX ADMIN — Medication 975 MILLIGRAM(S): at 02:54

## 2024-04-17 RX ADMIN — HEPARIN SODIUM 10000 UNIT(S): 5000 INJECTION INTRAVENOUS; SUBCUTANEOUS at 05:29

## 2024-04-17 RX ADMIN — Medication 500 MILLIGRAM(S): at 11:46

## 2024-04-17 RX ADMIN — Medication 600 MILLIGRAM(S): at 17:33

## 2024-04-17 RX ADMIN — Medication 325 MILLIGRAM(S): at 05:27

## 2024-04-17 RX ADMIN — Medication 975 MILLIGRAM(S): at 22:00

## 2024-04-17 NOTE — PROGRESS NOTE ADULT - ASSESSMENT
37yo  POD#3 pLTCS for Cat 2 tracing, intrapartum course complicated by Chorio. Delivery complicated by PPH (EBL 1891) for bleeding sinuses. Postpartum course complicated by RRT called for tachycardia, desaturations to 88%, and endometritis with a shock index of 1.23. Pt was fluid resuscitated with LR and 2u pRBCs yesterday with good improvement in symptoms. Abdominal pain continued throughout the day, more on the left than right, prompting workup with CTA Abd/Pelv that was negative - now resolved.     #PPH, s/p RRT  - Pt with adequate UOP, no longer tachycardic, with O2 sat >99%   - s/p TXA, extra pitocin, IM methergine, & transfusion 1u pRBCs intraop with 2u pRBCs postpartum    - s/p fluid resuscitation, now PO hydrating   - 33.1->(1u PRBC)->37.7->32.2->28.6->1u PRBC->26.8->1u pRBC->27.4->26.8  - PTT >200 with 3AM labs, possibly from heparinized syringe, 6am PTT 23.2  - Pt was tachycardic to the 130s-140s, desaturation to 88%, low urine output   - Lactate 2.9->2.6->1.0  - CTA AP (4/15): Small endometrial hematoma. No active hemorrhage. Two indeterminate nodular enhancement along endometrial surface of fundus - myometrium vs retained POC  - CTA PE (4/15): negative   - No current signs/sxs of acute anemia    #Chorio, endometritis  - Pt last febrile 4/15 @135a to 38C  - Blood cx, urine cx, placental cx sent  - S/p zosyn as she is over 24H afebrile  - WBC 9.72->11.55->11.34->10.77->9.7->9.9->11.3->10.3    #Postpartum state  - Continue with po analgesia  - Increase ambulation  - Continue regular diet  - DVT prophylaxis with 86638a heparin  - Encourage incentive spirometry  - Discharge planning     Ramya Verdin PGY-1   39yo  POD#3 pLTCS for Cat 2 tracing, intrapartum course complicated by Chorio. Delivery complicated by PPH (EBL 1891) for bleeding sinuses. Postpartum course complicated by RRT called for tachycardia, desaturations to 88%, and endometritis with a shock index of 1.23. Pt was fluid resuscitated with LR and 2u pRBCs yesterday with good improvement in symptoms. Abdominal pain continued throughout the day, more on the left than right, prompting workup with CTA Abd/Pelv that was negative - now resolved.     #PPH, s/p RRT  - Pt with adequate UOP, no longer tachycardic, with O2 sat >99%   - s/p TXA, extra pitocin, IM methergine, & transfusion 1u pRBCs intraop with 2u pRBCs postpartum    - s/p fluid resuscitation, now PO hydrating   - 33.1->(1u PRBC)->37.7->32.2->28.6->1u PRBC->26.8->1u pRBC->27.4->26.8  - PTT >200 with 3AM labs, possibly from heparinized syringe, 6am PTT 23.2  - Pt was tachycardic to the 130s-140s, desaturation to 88%, low urine output   - Lactate 2.9->2.6->1.0  - CTA AP (4/15): Small endometrial hematoma. No active hemorrhage. Two indeterminate nodular enhancement along endometrial surface of fundus - myometrium vs retained POC  - CTA PE (4/15): negative   - No current signs/sxs of acute anemia    #Chorio, endometritis  - Pt last febrile 4/15 @135a to 38C  - Blood cx, urine cx, placental cx sent  - S/p zosyn as she is over 24H afebrile  - WBC 9.72->11.55->11.34->10.77->9.7->9.9->11.3->10.3    #Postpartum state  - Continue with po analgesia  - Encourage heat/ice packs for neck discomfort and repositioning  - Increase ambulation  - Continue regular diet  - DVT prophylaxis with 21390l heparin  - Encourage incentive spirometry  - Discharge planning     Ramya Verdin PGY-1

## 2024-04-17 NOTE — PROGRESS NOTE ADULT - ATTENDING COMMENTS
Associate Chief of L & D ( late entry)      OB Progress Note:  Delivery, POD#3    S: 37yo POD#3 s/p LTCS . Patient reports feeling better  but was reflecting on the incident and that it was scary    O:   Vital Signs Last 24 Hrs  T(C): 36.4 (2024 09:34), Max: 36.7 (2024 13:48)  T(F): 97.5 (2024 09:34), Max: 98 (2024 13:48)  HR: 84 (2024 09:34) (84 - 87)  BP: 128/71 (2024 09:34) (110/63 - 132/74)  RR: 19 (2024 09:34) (18 - 19)  SpO2: 99% (2024 09:34) (99% - 100%)    Parameters below as of 2024 09:34  Patient On (Oxygen Delivery Method): room air        Labs:  Blood type: O Positive  Rubella IgG: RPR: Negative  LABS:                       9.5    10.29 )-----------( 225      ( 2024 06:15 )             29.4                           8.5<L>   9.70 >-----------< 189    ( 04-15 @ 10:25 )             26.8<L>                        8.8<L>   10.77<H> >-----------< 220    ( 04-15 @ 06:00 )             28.6<L>                        10.1<L>   11.34<H> >-----------< 233    ( 04-15 @ 02:02 )             32.2<L>                        10.8<L>   11.55<H> >-----------< 146<L>    (  @ 22:55 )             37.7                        10.2<L>   9.72 >-----------< 298    (  @ 17:10 )             33.1<L>    04-15-24 @ 10:25  136  |  106  |  9   ----------------------------<  130<H>  3.8   |  18<L>  |  0.73    04-15-24 @ 06:00  138  |  107  |  7   ----------------------------<  95  4.3   |  17<L>  |  0.62    04-15-24 @ 02:02  134<L>  |  105  |  6<L>  ----------------------------<  107<H>  3.7   |  16<L>  |  0.49<L>    lactate 2.9 --->  2.6----> 1.0    CT small clot in the endometrial lining    PE:  Abdomen: soft, Mildly distended, tender 4-5/10,   incision c/d/i.  Lochia scant   Extremities: No erythema, +2 edema    A/P: 37yo POD#3 s/p LTCS. due to CAT II arrest of dilatation and chorioamnionitis/ endometritis, suspected sepsis via criteria treated and  acute on chronic anemia due to PPH with qbl 1841 cc  s/p 3 units PRBC's    - Continue regular diet.  - Increase ambulation.  - Continue Motrin, Tylenol, oxycodone PRN for pain control.  vs. continue PCEA for pain.  - validated her concerns and explained that her feeling very valid and answered any questions she had    Mi Yeung M.D., M.B.A., M.S. Associate Chief of L & D ( late entry)      OB Progress Note:  Delivery, POD#3    S: 39yo POD#3 s/p LTCS . Patient reports feeling better  but was reflecting on the incident and that it was scary    O:   Vital Signs Last 24 Hrs  T(C): 36.4 (2024 09:34), Max: 36.7 (2024 13:48)  T(F): 97.5 (2024 09:34), Max: 98 (2024 13:48)  HR: 84 (2024 09:34) (84 - 87)  BP: 128/71 (2024 09:34) (110/63 - 132/74)  RR: 19 (2024 09:34) (18 - 19)  SpO2: 99% (2024 09:34) (99% - 100%)    Parameters below as of 2024 09:34  Patient On (Oxygen Delivery Method): room air        Labs:  Blood type: O Positive  Rubella IgG: RPR: Negative  LABS:                       9.5    10.29 )-----------( 225      ( 2024 06:15 )             29.4                           8.5<L>   9.70 >-----------< 189    ( 04-15 @ 10:25 )             26.8<L>                        8.8<L>   10.77<H> >-----------< 220    ( 04-15 @ 06:00 )             28.6<L>                        10.1<L>   11.34<H> >-----------< 233    ( 04-15 @ 02:02 )             32.2<L>                        10.8<L>   11.55<H> >-----------< 146<L>    (  @ 22:55 )             37.7                        10.2<L>   9.72 >-----------< 298    (  @ 17:10 )             33.1<L>    04-15-24 @ 10:25  136  |  106  |  9   ----------------------------<  130<H>  3.8   |  18<L>  |  0.73    04-15-24 @ 06:00  138  |  107  |  7   ----------------------------<  95  4.3   |  17<L>  |  0.62    04-15-24 @ 02:02  134<L>  |  105  |  6<L>  ----------------------------<  107<H>  3.7   |  16<L>  |  0.49<L>    lactate 2.9 --->  2.6----> 1.0    CT small clot in the endometrial lining    PE:  Abdomen: soft, Mildly distended, tender 4-5/10,   incision c/d/i.  Lochia scant   Extremities: No erythema, +2 edema L>R    A/P: 39yo POD#3 s/p LTCS. due to CAT II arrest of dilatation and chorioamnionitis/ endometritis, suspected sepsis via criteria treated and  acute on chronic anemia due to PPH with qbl 1841 cc  s/p 3 units PRBC's    - Continue regular diet.  - Increase ambulation.  - Continue Motrin, Tylenol, oxycodone PRN for pain control.  vs. continue PCEA for pain.  - validated her concerns and explained that her feeling very valid and answered any questions she had    Mi Yeung M.D., M.B.A., M.S.

## 2024-04-17 NOTE — PROGRESS NOTE ADULT - SUBJECTIVE AND OBJECTIVE BOX
R1 Progress Note    POD#3 pLTCS c/b chorio, PPH, endometritis. Patient seen and examined at bedside, no acute overnight events. No acute complaints, pain well controlled. Patient is ambulating, voiding, and tolerating regular diet. Passing flatus. Denies CP, SOB, N/V, HA, blurred vision, epigastric pain.    Vital Signs Last 24 Hours  T(C): 36.4 (04-16-24 @ 21:16), Max: 36.7 (04-16-24 @ 06:23)  HR: 87 (04-16-24 @ 21:16) (86 - 97)  BP: 121/72 (04-16-24 @ 21:16) (110/63 - 121/72)  RR: 18 (04-16-24 @ 21:16) (18 - 19)  SpO2: 99% (04-16-24 @ 21:16) (97% - 99%)    I&O's Summary    15 Apr 2024 07:01  -  16 Apr 2024 07:00  --------------------------------------------------------  IN: 5150 mL / OUT: 1850 mL / NET: 3300 mL        Physical Exam:  General: NAD  Abdomen: Soft, non-tender, non-distended, fundus firm  Incision: Pfannenstiel incision CDI, subcuticular suture closure  Pelvic: Lochia wnl    Labs:    Blood Type: O Positive  Antibody Screen: Negative  RPR: Negative               9.5    10.29 )-----------( 225      ( 04-16 @ 06:15 )             29.4                8.9    11.32 )-----------( 210      ( 04-16 @ 00:35 )             26.8                8.9    9.88  )-----------( 193      ( 04-15 @ 16:35 )             27.4         MEDICATIONS  (STANDING):  acetaminophen     Tablet .. 975 milliGRAM(s) Oral <User Schedule>  ascorbic acid 500 milliGRAM(s) Oral daily  diphtheria/tetanus/pertussis (acellular) Vaccine (Adacel) 0.5 milliLiter(s) IntraMuscular once  ferrous    sulfate 325 milliGRAM(s) Oral three times a day  heparin   Injectable 35309 Unit(s) SubCutaneous every 12 hours  ibuprofen  Tablet. 600 milliGRAM(s) Oral every 6 hours  senna 2 Tablet(s) Oral at bedtime    MEDICATIONS  (PRN):  diphenhydrAMINE 25 milliGRAM(s) Oral every 6 hours PRN Pruritus  lanolin Ointment 1 Application(s) Topical every 6 hours PRN Sore Nipples  magnesium hydroxide Suspension 30 milliLiter(s) Oral two times a day PRN Constipation  oxyCODONE    IR 5 milliGRAM(s) Oral every 3 hours PRN Moderate to Severe Pain (4-10)  oxyCODONE    IR 5 milliGRAM(s) Oral once PRN Moderate to Severe Pain (4-10)  simethicone 80 milliGRAM(s) Chew every 4 hours PRN Gas   R1 Progress Note    POD#3 pLTCS c/b chorio, PPH, endometritis. Patient seen and examined at bedside, no acute overnight events. No acute complaints, pain well controlled. Patient is ambulating, voiding, and tolerating regular diet. Passing flatus. Denies CP, SOB, N/V, HA, blurred vision, epigastric pain. Pt with some neck pain that she believes is from laying flat on her back.     Vital Signs Last 24 Hours  T(C): 36.4 (04-16-24 @ 21:16), Max: 36.7 (04-16-24 @ 06:23)  HR: 87 (04-16-24 @ 21:16) (86 - 97)  BP: 121/72 (04-16-24 @ 21:16) (110/63 - 121/72)  RR: 18 (04-16-24 @ 21:16) (18 - 19)  SpO2: 99% (04-16-24 @ 21:16) (97% - 99%)    I&O's Summary    15 Apr 2024 07:01  -  16 Apr 2024 07:00  --------------------------------------------------------  IN: 5150 mL / OUT: 1850 mL / NET: 3300 mL        Physical Exam:  General: NAD  Abdomen: Soft, non-tender, non-distended, fundus firm  Incision: Pfannenstiel incision CDI, subcuticular suture closure  Pelvic: Lochia wnl    Labs:    Blood Type: O Positive  Antibody Screen: Negative  RPR: Negative               9.5    10.29 )-----------( 225      ( 04-16 @ 06:15 )             29.4                8.9    11.32 )-----------( 210      ( 04-16 @ 00:35 )             26.8                8.9    9.88  )-----------( 193      ( 04-15 @ 16:35 )             27.4         MEDICATIONS  (STANDING):  acetaminophen     Tablet .. 975 milliGRAM(s) Oral <User Schedule>  ascorbic acid 500 milliGRAM(s) Oral daily  diphtheria/tetanus/pertussis (acellular) Vaccine (Adacel) 0.5 milliLiter(s) IntraMuscular once  ferrous    sulfate 325 milliGRAM(s) Oral three times a day  heparin   Injectable 57475 Unit(s) SubCutaneous every 12 hours  ibuprofen  Tablet. 600 milliGRAM(s) Oral every 6 hours  senna 2 Tablet(s) Oral at bedtime    MEDICATIONS  (PRN):  diphenhydrAMINE 25 milliGRAM(s) Oral every 6 hours PRN Pruritus  lanolin Ointment 1 Application(s) Topical every 6 hours PRN Sore Nipples  magnesium hydroxide Suspension 30 milliLiter(s) Oral two times a day PRN Constipation  oxyCODONE    IR 5 milliGRAM(s) Oral every 3 hours PRN Moderate to Severe Pain (4-10)  oxyCODONE    IR 5 milliGRAM(s) Oral once PRN Moderate to Severe Pain (4-10)  simethicone 80 milliGRAM(s) Chew every 4 hours PRN Gas

## 2024-04-18 VITALS
TEMPERATURE: 98 F | HEART RATE: 81 BPM | DIASTOLIC BLOOD PRESSURE: 79 MMHG | RESPIRATION RATE: 17 BRPM | SYSTOLIC BLOOD PRESSURE: 124 MMHG | OXYGEN SATURATION: 99 %

## 2024-04-18 LAB — FIBRINOGEN AG PPP IA-MCNC: 526 MG/DL — HIGH (ref 206–478)

## 2024-04-18 RX ADMIN — Medication 975 MILLIGRAM(S): at 11:12

## 2024-04-18 RX ADMIN — HEPARIN SODIUM 10000 UNIT(S): 5000 INJECTION INTRAVENOUS; SUBCUTANEOUS at 07:02

## 2024-04-18 RX ADMIN — Medication 600 MILLIGRAM(S): at 00:04

## 2024-04-18 RX ADMIN — Medication 325 MILLIGRAM(S): at 07:07

## 2024-04-18 RX ADMIN — OXYCODONE HYDROCHLORIDE 5 MILLIGRAM(S): 5 TABLET ORAL at 14:20

## 2024-04-18 RX ADMIN — Medication 600 MILLIGRAM(S): at 01:00

## 2024-04-18 RX ADMIN — Medication 600 MILLIGRAM(S): at 07:07

## 2024-04-18 RX ADMIN — OXYCODONE HYDROCHLORIDE 5 MILLIGRAM(S): 5 TABLET ORAL at 13:49

## 2024-04-18 RX ADMIN — Medication 600 MILLIGRAM(S): at 07:30

## 2024-04-18 RX ADMIN — Medication 600 MILLIGRAM(S): at 13:00

## 2024-04-18 RX ADMIN — Medication 325 MILLIGRAM(S): at 13:49

## 2024-04-18 RX ADMIN — Medication 975 MILLIGRAM(S): at 11:50

## 2024-04-18 NOTE — PROGRESS NOTE ADULT - ATTENDING COMMENTS
Associate Chief of L & D ( late entry)  OB Progress Note:  Delivery, POD#4    S: 39yo POD#4 s/p LTCS . Patient reports feeling better     O:   Vital Signs Last 24 Hrs  T(C): 36.7 (2024 06:20), Max: 37.1 (2024 13:23)  T(F): 98 (2024 06:20), Max: 98.7 (2024 13:23)  HR: 72 (2024 06:20) (72 - 78)  BP: 128/67 (2024 06:20) (128/67 - 134/74)  RR: 18 (2024 06:20) (16 - 20)  SpO2: 100% (2024 06:20) (99% - 100%)          Labs:  Blood type: O Positive  Rubella IgG: RPR: Negative  LABS:                       9.5    10.29 )-----------( 225      ( 2024 06:15 )             29.4                           8.5<L>   9.70 >-----------< 189    ( 04-15 @ 10:25 )             26.8<L>                        8.8<L>   10.77<H> >-----------< 220    ( 04-15 @ 06:00 )             28.6<L>                        10.1<L>   11.34<H> >-----------< 233    ( 04-15 @ 02:02 )             32.2<L>                        10.8<L>   11.55<H> >-----------< 146<L>    (  @ 22:55 )             37.7                        10.2<L>   9.72 >-----------< 298    (  @ 17:10 )             33.1<L>    04-15-24 @ 10:25  136  |  106  |  9   ----------------------------<  130<H>  3.8   |  18<L>  |  0.73    04-15-24 @ 06:00  138  |  107  |  7   ----------------------------<  95  4.3   |  17<L>  |  0.62    04-15-24 @ 02:02  134<L>  |  105  |  6<L>  ----------------------------<  107<H>  3.7   |  16<L>  |  0.49<L>    lactate 2.9 --->  2.6----> 1.0    CT small clot in the endometrial lining    PE:  Abdomen: soft, Mildly distended, tender 4-5/10,   incision c/d/i.  Lochia scant   Extremities: No erythema, +2 edema L>R    A/P: 39yo POD#4 s/p LTCS. due to CAT II arrest of dilatation and chorioamnionitis/ endometritis, suspected sepsis via criteria treated and  acute on chronic anemia due to PPH with qbl 1841 cc  s/p 3 units PRBC's    -  Patient is stable for discharge and will follow up in the PP clinic    Misumeet Yeung M.D., M.B.A., M.S.

## 2024-04-18 NOTE — PROGRESS NOTE ADULT - ASSESSMENT
39yo  POD#3 pLTCS for Cat 2 tracing, intrapartum course complicated by Chorio. Delivery complicated by PPH (EBL 1891) for bleeding sinuses. Postpartum course complicated by RRT called for tachycardia, desaturations to 88%, and endometritis with a shock index of 1.23. Pt was fluid resuscitated with LR and 2u pRBCs yesterday with good improvement in symptoms. Abdominal pain continued throughout the day, more on the left than right, prompting workup with CTA Abd/Pelv that was negative - now resolved. Patient stable and progressing well in the postpartum state.    #PPH, s/p RRT  - Pt with adequate UOP, no longer tachycardic, with O2 sat >99%   - s/p TXA, extra pitocin, IM methergine, & transfusion 1u pRBCs intraop with 2u pRBCs postpartum    - s/p fluid resuscitation, now PO hydrating   - 33.1->(1u PRBC)->37.7->32.2->28.6->1u PRBC->26.8->1u pRBC->27.4->26.8  - PTT >200 with initial postop labs, from heparinized syringe, with subsequent PTT 23.2  - Pt was tachycardic to the 130s-140s, desaturation to 88%, low urine output   - Lactate 2.9->2.6->1.0  - CTA AP (4/15): Small endometrial hematoma. No active hemorrhage. Two indeterminate nodular enhancement along endometrial surface of fundus - myometrium vs retained POC  - CTA PE (4/15): negative   - No current signs/sxs of acute anemia  - Cont iron, vit c, PNV    #Chorio, endometritis  - Pt last febrile 4/15 @135a to 38C  - Blood cx, urine cx, placental cx sent - all currently negative  - S/p zosyn as she is over 24H afebrile  - WBC 9.72->11.55->11.34->10.77->9.7->9.9->11.3->10.3    #Postpartum state  - Continue with po analgesia  - Increase ambulation  - Continue regular diet  - DVT prophylaxis with 91302q heparin  - Encourage incentive spirometry  - Discharge planning     Ramya Verdin PGY-1 37yo  POD#3 pLTCS for Cat 2 tracing, intrapartum course complicated by Chorio. Delivery complicated by PPH (EBL 1891) for bleeding sinuses. Postpartum course complicated by RRT called for tachycardia, desaturations to 88%, and endometritis with a shock index of 1.23. Pt was fluid resuscitated with LR and 2u pRBCs yesterday with good improvement in symptoms. Abdominal pain continued throughout the day, more on the left than right, prompting workup with CTA Abd/Pelv that was negative - now resolved. Patient stable and progressing well in the postpartum state.    #PPH, s/p RRT  - Pt with adequate UOP, no longer tachycardic, with O2 sat >99%   - s/p TXA, extra pitocin, IM methergine, & transfusion 1u pRBCs intraop with 2u pRBCs postpartum    - s/p fluid resuscitation, now PO hydrating   - 33.1->(1u PRBC)->37.7->32.2->28.6->1u PRBC->26.8->1u pRBC->27.4->26.8  - PTT >200 with initial postop labs, from heparinized syringe, with subsequent PTT 23.2  - Pt was tachycardic to the 130s-140s, desaturation to 88%, low urine output   - Lactate 2.9->2.6->1.0  - CTA AP (4/15): Small endometrial hematoma. No active hemorrhage. Two indeterminate nodular enhancement along endometrial surface of fundus - myometrium vs retained POC  - CTA PE (4/15): negative   - No current signs/sxs of acute anemia  - Cont iron, vit c, PNV    #Chorio, endometritis  - Pt last febrile 4/15 @135a to 38C  - Blood cx, urine cx, placental cx sent - all currently negative  - S/p zosyn as she is over 24H afebrile  - WBC 9.72->11.55->11.34->10.77->9.7->9.9->11.3->10.3  - No continued signs of infection on PE    #Postpartum state  - Continue with po analgesia  - Increase ambulation  - Continue regular diet  - DVT prophylaxis with 04056b heparin  - Encourage incentive spirometry  - Discharge planning, counseled patient on returning within 1 week for incision check and aquacel dressing removal     Ramya Verdin PGY-1

## 2024-04-18 NOTE — PROGRESS NOTE ADULT - SUBJECTIVE AND OBJECTIVE BOX
R1 Progress Note    POD#4 pLTCS c/b chorio, PPH, endometritis. Patient seen and examined at bedside, no acute overnight events. No acute complaints, pain well controlled. Patient is ambulating, voiding, and tolerating regular diet. Passing flatus. Denies CP, SOB, N/V, HA, blurred vision, epigastric pain.    Vital Signs Last 24 Hours  T(C): 37 (04-17-24 @ 22:00), Max: 37.1 (04-17-24 @ 13:23)  HR: 75 (04-17-24 @ 22:00) (72 - 84)  BP: 132/71 (04-17-24 @ 22:00) (128/71 - 134/74)  RR: 16 (04-17-24 @ 22:00) (16 - 20)  SpO2: 100% (04-17-24 @ 22:00) (99% - 100%)    I&O's Summary      Physical Exam:  General: NAD  Abdomen: Soft, non-tender, non-distended, fundus firm  Incision: Pfannenstiel incision CDI, subcuticular suture closure  Pelvic: Lochia wnl    Labs:    Blood Type: O Positive  Antibody Screen: Negative  RPR: Negative               9.5    10.29 )-----------( 225      ( 04-16 @ 06:15 )             29.4                8.9    11.32 )-----------( 210      ( 04-16 @ 00:35 )             26.8                8.9    9.88  )-----------( 193      ( 04-15 @ 16:35 )             27.4         MEDICATIONS  (STANDING):  acetaminophen     Tablet .. 975 milliGRAM(s) Oral <User Schedule>  ascorbic acid 500 milliGRAM(s) Oral daily  diphtheria/tetanus/pertussis (acellular) Vaccine (Adacel) 0.5 milliLiter(s) IntraMuscular once  ferrous    sulfate 325 milliGRAM(s) Oral three times a day  heparin   Injectable 56589 Unit(s) SubCutaneous every 12 hours  ibuprofen  Tablet. 600 milliGRAM(s) Oral every 6 hours  senna 2 Tablet(s) Oral at bedtime    MEDICATIONS  (PRN):  diphenhydrAMINE 25 milliGRAM(s) Oral every 6 hours PRN Pruritus  lanolin Ointment 1 Application(s) Topical every 6 hours PRN Sore Nipples  magnesium hydroxide Suspension 30 milliLiter(s) Oral two times a day PRN Constipation  oxyCODONE    IR 5 milliGRAM(s) Oral every 3 hours PRN Moderate to Severe Pain (4-10)  oxyCODONE    IR 5 milliGRAM(s) Oral once PRN Moderate to Severe Pain (4-10)  simethicone 80 milliGRAM(s) Chew every 4 hours PRN Gas

## 2024-04-24 ENCOUNTER — APPOINTMENT (OUTPATIENT)
Dept: OBGYN | Facility: HOSPITAL | Age: 39
End: 2024-04-24
Payer: COMMERCIAL

## 2024-04-24 ENCOUNTER — OUTPATIENT (OUTPATIENT)
Dept: OUTPATIENT SERVICES | Facility: HOSPITAL | Age: 39
LOS: 1 days | End: 2024-04-24

## 2024-04-24 VITALS
BODY MASS INDEX: 41.04 KG/M2 | WEIGHT: 223 LBS | SYSTOLIC BLOOD PRESSURE: 112 MMHG | HEART RATE: 112 BPM | TEMPERATURE: 97.9 F | DIASTOLIC BLOOD PRESSURE: 72 MMHG | HEIGHT: 62 IN

## 2024-04-24 DIAGNOSIS — Z98.890 OTHER SPECIFIED POSTPROCEDURAL STATES: Chronic | ICD-10-CM

## 2024-04-24 DIAGNOSIS — Z34.83 ENCOUNTER FOR SUPERVISION OF OTHER NORMAL PREGNANCY, THIRD TRIMESTER: ICD-10-CM

## 2024-04-24 DIAGNOSIS — D50.8 OTHER IRON DEFICIENCY ANEMIAS: ICD-10-CM

## 2024-04-24 DIAGNOSIS — Z86.39 PERSONAL HISTORY OF OTHER ENDOCRINE, NUTRITIONAL AND METABOLIC DISEASE: ICD-10-CM

## 2024-04-24 PROCEDURE — 99212 OFFICE O/P EST SF 10 MIN: CPT

## 2024-04-24 NOTE — HISTORY OF PRESENT ILLNESS
[Postpartum Follow Up] : postpartum follow up [Complications:___] : complications include: [unfilled] [Last Pap Date: ___] : Last Pap Date: [unfilled] [Delivery Date: ___] : on [unfilled] [Primary C/S] : delivered by  section [Female] : Delivery History: baby girl [Wt. ___] : weighing [unfilled] [Rhogam] : Rhogam was not administered [Rubella Vaccine] : Rubella vaccine was not administered [Pertussis Vaccine] : Pertussis vaccine administered [BTL] : no tubal ligation [Breastfeeding] : currently nursing [Discharge HCT: ___] : hematocrit level was [unfilled] [Discharge HGB: ___] : hemoglobin level was [unfilled] [Resumed Menses] : has not resumed her menses [Resumed [place]] : has not resumed intercourse [Intended Contraception] : Intended Contraception: [Oral Contraceptives] : oral contraceptives [S/Sx PP Depression] : no signs/symptoms of postpartum depression [None] : No associated symptoms are reported [Clean/Dry/Intact] : clean, dry and intact [Doing Well] : is doing well [FreeTextEntry8] : Here for wound check.  24 Girl 8 lb [de-identified] : Keep incision and dry . No heavy lifting. Taking Prenatal vit 1 tab po OD. Happy with baby. Contraceptive counseling- wants OCPs. RTC 4 weeks. MHegarty NP

## 2024-04-25 ENCOUNTER — APPOINTMENT (OUTPATIENT)
Age: 39
End: 2024-04-25

## 2024-04-25 DIAGNOSIS — Z51.89 ENCOUNTER FOR OTHER SPECIFIED AFTERCARE: ICD-10-CM

## 2024-04-25 DIAGNOSIS — Z98.891 HISTORY OF UTERINE SCAR FROM PREVIOUS SURGERY: ICD-10-CM

## 2024-04-26 ENCOUNTER — EMERGENCY (EMERGENCY)
Facility: HOSPITAL | Age: 39
LOS: 1 days | Discharge: STILL ADMITTED OR ADMINISTRATIVE | End: 2024-04-26
Admitting: EMERGENCY MEDICINE
Payer: MEDICAID

## 2024-04-26 ENCOUNTER — OUTPATIENT (OUTPATIENT)
Dept: INPATIENT UNIT | Facility: HOSPITAL | Age: 39
LOS: 1 days | Discharge: HOME | End: 2024-04-26
Payer: MEDICAID

## 2024-04-26 ENCOUNTER — APPOINTMENT (OUTPATIENT)
Dept: ANTEPARTUM | Facility: CLINIC | Age: 39
End: 2024-04-26

## 2024-04-26 VITALS
TEMPERATURE: 99 F | RESPIRATION RATE: 18 BRPM | OXYGEN SATURATION: 99 % | DIASTOLIC BLOOD PRESSURE: 88 MMHG | SYSTOLIC BLOOD PRESSURE: 160 MMHG | HEIGHT: 62 IN | HEART RATE: 95 BPM

## 2024-04-26 DIAGNOSIS — Z98.890 OTHER SPECIFIED POSTPROCEDURAL STATES: Chronic | ICD-10-CM

## 2024-04-26 DIAGNOSIS — O26.899 OTHER SPECIFIED PREGNANCY RELATED CONDITIONS, UNSPECIFIED TRIMESTER: ICD-10-CM

## 2024-04-26 LAB
ALBUMIN SERPL ELPH-MCNC: 3.8 G/DL — SIGNIFICANT CHANGE UP (ref 3.3–5)
ALP SERPL-CCNC: 131 U/L — HIGH (ref 40–120)
ALT FLD-CCNC: 14 U/L — SIGNIFICANT CHANGE UP (ref 4–33)
ANION GAP SERPL CALC-SCNC: 12 MMOL/L — SIGNIFICANT CHANGE UP (ref 7–14)
AST SERPL-CCNC: 10 U/L — SIGNIFICANT CHANGE UP (ref 4–32)
BASOPHILS # BLD AUTO: 0.05 K/UL — SIGNIFICANT CHANGE UP (ref 0–0.2)
BASOPHILS NFR BLD AUTO: 0.5 % — SIGNIFICANT CHANGE UP (ref 0–2)
BILIRUB SERPL-MCNC: <0.2 MG/DL — SIGNIFICANT CHANGE UP (ref 0.2–1.2)
BUN SERPL-MCNC: 17 MG/DL — SIGNIFICANT CHANGE UP (ref 7–23)
CALCIUM SERPL-MCNC: 9.4 MG/DL — SIGNIFICANT CHANGE UP (ref 8.4–10.5)
CHLORIDE SERPL-SCNC: 105 MMOL/L — SIGNIFICANT CHANGE UP (ref 98–107)
CO2 SERPL-SCNC: 23 MMOL/L — SIGNIFICANT CHANGE UP (ref 22–31)
CREAT SERPL-MCNC: 0.56 MG/DL — SIGNIFICANT CHANGE UP (ref 0.5–1.3)
EGFR: 120 ML/MIN/1.73M2 — SIGNIFICANT CHANGE UP
EOSINOPHIL # BLD AUTO: 0.17 K/UL — SIGNIFICANT CHANGE UP (ref 0–0.5)
EOSINOPHIL NFR BLD AUTO: 1.7 % — SIGNIFICANT CHANGE UP (ref 0–6)
GLUCOSE SERPL-MCNC: 91 MG/DL — SIGNIFICANT CHANGE UP (ref 70–99)
HCT VFR BLD CALC: 34.9 % — SIGNIFICANT CHANGE UP (ref 34.5–45)
HGB BLD-MCNC: 11.1 G/DL — LOW (ref 11.5–15.5)
IANC: 7.58 K/UL — HIGH (ref 1.8–7.4)
IMM GRANULOCYTES NFR BLD AUTO: 0.7 % — SIGNIFICANT CHANGE UP (ref 0–0.9)
LDH SERPL L TO P-CCNC: 197 U/L — SIGNIFICANT CHANGE UP (ref 135–225)
LYMPHOCYTES # BLD AUTO: 1.81 K/UL — SIGNIFICANT CHANGE UP (ref 1–3.3)
LYMPHOCYTES # BLD AUTO: 17.6 % — SIGNIFICANT CHANGE UP (ref 13–44)
MCHC RBC-ENTMCNC: 26.2 PG — LOW (ref 27–34)
MCHC RBC-ENTMCNC: 31.8 GM/DL — LOW (ref 32–36)
MCV RBC AUTO: 82.3 FL — SIGNIFICANT CHANGE UP (ref 80–100)
MONOCYTES # BLD AUTO: 0.6 K/UL — SIGNIFICANT CHANGE UP (ref 0–0.9)
MONOCYTES NFR BLD AUTO: 5.8 % — SIGNIFICANT CHANGE UP (ref 2–14)
NEUTROPHILS # BLD AUTO: 7.58 K/UL — HIGH (ref 1.8–7.4)
NEUTROPHILS NFR BLD AUTO: 73.7 % — SIGNIFICANT CHANGE UP (ref 43–77)
NRBC # BLD: 0 /100 WBCS — SIGNIFICANT CHANGE UP (ref 0–0)
NRBC # FLD: 0 K/UL — SIGNIFICANT CHANGE UP (ref 0–0)
PLATELET # BLD AUTO: 629 K/UL — HIGH (ref 150–400)
POTASSIUM SERPL-MCNC: 4.5 MMOL/L — SIGNIFICANT CHANGE UP (ref 3.5–5.3)
POTASSIUM SERPL-SCNC: 4.5 MMOL/L — SIGNIFICANT CHANGE UP (ref 3.5–5.3)
PROT SERPL-MCNC: 7.2 G/DL — SIGNIFICANT CHANGE UP (ref 6–8.3)
RBC # BLD: 4.24 M/UL — SIGNIFICANT CHANGE UP (ref 3.8–5.2)
RBC # FLD: 17.4 % — HIGH (ref 10.3–14.5)
SODIUM SERPL-SCNC: 140 MMOL/L — SIGNIFICANT CHANGE UP (ref 135–145)
URATE SERPL-MCNC: 4.2 MG/DL — SIGNIFICANT CHANGE UP (ref 2.5–7)
WBC # BLD: 10.28 K/UL — SIGNIFICANT CHANGE UP (ref 3.8–10.5)
WBC # FLD AUTO: 10.28 K/UL — SIGNIFICANT CHANGE UP (ref 3.8–10.5)

## 2024-04-26 PROCEDURE — L9996: CPT

## 2024-04-26 PROCEDURE — 93010 ELECTROCARDIOGRAM REPORT: CPT

## 2024-04-26 PROCEDURE — 99213 OFFICE O/P EST LOW 20 MIN: CPT

## 2024-04-26 RX ORDER — IBUPROFEN 200 MG
800 TABLET ORAL ONCE
Refills: 0 | Status: COMPLETED | OUTPATIENT
Start: 2024-04-26 | End: 2024-04-27

## 2024-04-26 NOTE — OB POSTPARTUM TRIAGE NOTE - ADDITIONAL INSTRUCTIONS
Patient to follow up in the ER for wound care, Saturday and Sunday   Homecare will be set up for weekdays  Increase oral hydration  Signs and Symptoms of infection reviewed  Keeps incision dry and clean   Continue Motrin and Tylenol as directed

## 2024-04-26 NOTE — OB POSTPARTUM TRIAGE NOTE - NSHPPHYSICALEXAM_GEN_ALL_CORE
Assessment reveals VSS  General: Female sitting comfortably in no apparent distress  Neuro: No facial asymmetry, no slurred speech, moves all 4 extremities  Mood: Alert and lucid, appropriate mood and affect  A&Ox3  Lungs- clear bilateral  Heart- normal rate and regular rhythm  Extremities- Warm, Dry, no edema present, good pulses   Abdomen soft, NT, low transverse incision, redness and minimal oozing noted        PLAN:  HELLP labs- elevated BP in ER, none in triage Assessment reveals VSS  General: Female sitting comfortably in no apparent distress  Neuro: No facial asymmetry, no slurred speech, moves all 4 extremities  Mood: Alert and lucid, appropriate mood and affect  A&Ox3  Lungs- clear bilateral  Heart- normal rate and regular rhythm  Extremities- Warm, Dry, no edema present, good pulses   Abdomen soft, NT, low transverse incision, redness and minimal oozing noted with dime size opening of incision right of midline    PLAN:  HELLP labs- elevated BP in ER, none in triage    23:35  Dr. Kelly at the bedside, evaluating wound, cleaned with saline at incision opening, dime size in diameter and 1/2 inch deep

## 2024-04-26 NOTE — OB POSTPARTUM TRIAGE NOTE - NSDELIVERYTYPEA_OBGYN_ALL_OB
Alexa Sheppard was seen and treated in our emergency department on 10/2/2022. She may return to school on 10/05/2022. If you have any questions or concerns, please don't hesitate to call.       Tiana Gaucher, SHEYAL - CNP
Araceli Jsutice was seen and treated in our emergency department on 10/2/2022. She may return to school on 10/05/2022. If you have any questions or concerns, please don't hesitate to call.       Allison Houston, SHEYLA - CNP
 Delivery

## 2024-04-26 NOTE — OB POSTPARTUM TRIAGE NOTE - PLAN OF CARE
D/C Home  D/W Dr. Kelly  No evidence of hypertensive disorder  Wound Care of c/s incision  Patient to follow up in the ER for wound care, Saturday and Sunday   Homecare will be set up for weekdays  Increase oral hydration  Signs and Symptoms of infection reviewed  Keeps incision dry and clean   Continue Motrin and Tylenol as directed

## 2024-04-26 NOTE — OB POSTPARTUM TRIAGE NOTE - NSHPLABSRESULTS_GEN_ALL_CORE
CBC Full  -  ( 26 Apr 2024 19:50 )  WBC Count : 10.28 K/uL  RBC Count : 4.24 M/uL  Hemoglobin : 11.1 g/dL  Hematocrit : 34.9 %  Platelet Count - Automated : 629 K/uL  Mean Cell Volume : 82.3 fL  Mean Cell Hemoglobin : 26.2 pg  Mean Cell Hemoglobin Concentration : 31.8 gm/dL  Auto Neutrophil # : 7.58 K/uL  Auto Lymphocyte # : 1.81 K/uL  Auto Monocyte # : 0.60 K/uL  Auto Eosinophil # : 0.17 K/uL  Auto Basophil # : 0.05 K/uL  Auto Neutrophil % : 73.7 %  Auto Lymphocyte % : 17.6 %  Auto Monocyte % : 5.8 %  Auto Eosinophil % : 1.7 %  Auto Basophil % : 0.5 %    04-26-24 @ 19:50    140  |  105  |  17             --------------------------< 91     4.5  |  23  | 0.56    eGFR AA: --  eGFR N-AA: --    Calcium: 9.4  Phosphorus: --  Magnesium: --    AST: 10    ALT: 14  AlkPhos: 131<H>  Protein: 7.2  Albumin: 3.8  TBili: <0.2  D-Bili: --

## 2024-04-26 NOTE — OB POSTPARTUM TRIAGE NOTE - NSOBPROVIDERNOTE_OBGYN_ALL_OB_FT
Service attending    Patient seen and examined by me.   VSS 1 elevated BP  nl labs    incision 1 cm area opened with serosanginous drainage- wound probed with qtip and very shallow approx 0.5cm- irrigated with sterile saline  resident to pack with 1/4in packing and wet to dry dressing    patient to return to ER Saturday and Sunday for wound care until home care can be arranged on Monday.    Parmjit DWYER

## 2024-04-26 NOTE — ED ADULT NURSE NOTE - HIV OFFER
Plastic Surgeon Procedure Text (E): After obtaining clear surgical margins the patient was sent to plastics for surgical repair.  The patient understands they will receive post-surgical care and follow-up from the referring physician's office. Previously Negative (within the last year)

## 2024-04-26 NOTE — OB POSTPARTUM TRIAGE NOTE - CHIEF COMPLAINT QUOTE
Presents from the ED s/p C/S on 4/14 with "leaking of incision since yesterday"  Elevated BP in ER with no history, 160/80

## 2024-04-26 NOTE — OB POSTPARTUM TRIAGE NOTE - HISTORY OF PRESENT ILLNESS
37y/o  PP from Primary C/S on 24 for maternal temp, cat 2 and failure to descend. Patient presented to the ED for "pinkish clear fluid leaking from my incision on the right hand side since yesterday"   39y/o  PP from Primary C/S on 24 for maternal temp, cat 2 and failure to descend. Patient presented to the ED for "pinkish clear fluid leaking from my incision on the right hand side since yesterday", Sent to triage for wound care and 1 elevated BP in ER, 160/80, no hx of elevated bps or hypertensive disorder   Denies fever/aches/chills    Allergies: Denies  Medications: Motrin, PNV. Iron

## 2024-04-26 NOTE — ED ADULT TRIAGE NOTE - CHIEF COMPLAINT QUOTE
c/o pain and serosanguineous fluid leaking from C section site, reports had C section delivery on 24. denies fever, chills, denies Phx, . BP as noted, states did not have BP issued during pregnancy. L&D called pt to be evaluated upstairs first./

## 2024-04-27 ENCOUNTER — EMERGENCY (EMERGENCY)
Facility: HOSPITAL | Age: 39
LOS: 1 days | Discharge: HOME | End: 2024-04-27
Attending: EMERGENCY MEDICINE | Admitting: EMERGENCY MEDICINE
Payer: MEDICAID

## 2024-04-27 VITALS
RESPIRATION RATE: 18 BRPM | HEART RATE: 92 BPM | TEMPERATURE: 98 F | DIASTOLIC BLOOD PRESSURE: 75 MMHG | OXYGEN SATURATION: 99 % | HEIGHT: 62 IN | SYSTOLIC BLOOD PRESSURE: 119 MMHG

## 2024-04-27 DIAGNOSIS — Z98.890 OTHER SPECIFIED POSTPROCEDURAL STATES: Chronic | ICD-10-CM

## 2024-04-27 PROCEDURE — 99282 EMERGENCY DEPT VISIT SF MDM: CPT

## 2024-04-27 PROCEDURE — 99284 EMERGENCY DEPT VISIT MOD MDM: CPT

## 2024-04-27 RX ADMIN — Medication 800 MILLIGRAM(S): at 00:00

## 2024-04-27 NOTE — CHART NOTE - NSCHARTNOTEFT_GEN_A_CORE
Patient seen at bedside for drainage from C/S wound    Patient is POD #13 s/p pLTCS for cat II FHT. Incision initially evaluated by Dr. Kelly. Approx 1cm area superficial area of incision opened Patient seen at bedside for drainage from C/S wound    Patient is POD #13 s/p pLTCS for cat II FHT. Incision initially evaluated by Dr. Kelly. Approx 1cm area superficial area of incision opened, 1cm deep (at level of sub-q). Pink tinged, serous discharge expressed from incision. Area packed with 0.25in iodoform dressing and covered with wet-to-dry packing.     Patient instructed to return to ED for dressing change tomorrow.     MARISOL Kimball PGY3

## 2024-04-27 NOTE — ED PROVIDER NOTE - PATIENT PORTAL LINK FT
You can access the FollowMyHealth Patient Portal offered by [facility] by registering at the following website: [email or web address]. By joining [employer]’s FollowMyHealth portal, you will also be able to view your health information using other applications (apps) compatible with our system.

## 2024-04-27 NOTE — CONSULT NOTE ADULT - SUBJECTIVE AND OBJECTIVE BOX
GYN Consult Note    MICHELL YEPEZ  38y  Female [id number]    HPI:        Name of GYN Physician:   OBHx:    GynHx: Denies fibroids, cysts, endometriosis, STI's, Abnormal pap smears   PMH:  PSH:  Meds:  Allx:  Social History:  Denies smoking use, drug use, alcohol use.   +occasional social alcohol use    Vital Signs Last 24 Hrs  T(C): 36.5 (27 Apr 2024 17:33), Max: 36.5 (27 Apr 2024 17:33)  T(F): 97.7 (27 Apr 2024 17:33), Max: 97.7 (27 Apr 2024 17:33)  HR: 92 (27 Apr 2024 17:33) (92 - 92)  BP: 119/75 (27 Apr 2024 17:33) (119/75 - 119/75)  BP(mean): --  RR: 18 (27 Apr 2024 17:33) (18 - 18)  SpO2: 99% (27 Apr 2024 17:33) (99% - 99%)    Parameters below as of 27 Apr 2024 17:33  Patient On (Oxygen Delivery Method): room air        Physical Exam:   General: sitting comfortably in bed, NAD   HEENT: full ROM  CV: well perfused  Lungs: breathing comfortably on RA  Back: No CVA tenderness  Abd: Soft, non-tender, non-distended.  Bowel sounds present.    :  No bleeding on pad.    External labia wnl.  Bimanual exam with no cervical motion tenderness, uterus wnl, adnexa non palpable b/l.  Cervix closed vs. Cervix dilated  Speculum Exam: No active bleeding from os.  Physiologic discharge.    Ext: non-tender b/l, no edema     LABS:                            11.1   10.28 )-----------( 629      ( 26 Apr 2024 19:50 )             34.9     04-26    140  |  105  |  17  ----------------------------<  91  4.5   |  23  |  0.56    Ca    9.4      26 Apr 2024 19:50    TPro  7.2  /  Alb  3.8  /  TBili  <0.2  /  DBili  x   /  AST  10  /  ALT  14  /  AlkPhos  131<H>  04-26    I&O's Detail      Urinalysis Basic - ( 26 Apr 2024 19:50 )    Color: x / Appearance: x / SG: x / pH: x  Gluc: 91 mg/dL / Ketone: x  / Bili: x / Urobili: x   Blood: x / Protein: x / Nitrite: x   Leuk Esterase: x / RBC: x / WBC x   Sq Epi: x / Non Sq Epi: x / Bacteria: x        RADIOLOGY & ADDITIONAL STUDIES:     GYN Consult Note    MICHELL YEPEZ  38y  Female [id number]    HPI:  38y  POD#13 s/p pCS for category 2 tracing presents to the ED for wound care. Pt initially presented yesterday with serosanguinous drainage form her incision. At that time her incision was noted to have a small 1cm defect. The wound was probed and fascia was intact at that time. Her incision was packed with iodoform packing and pt instructed to f/u today to replace the packing. Pt reports that she is well. She denies fevers, redness/drainage of incision, chills, nausea, vomiting, lightheadedness. Pt says that her abdominal pain is well controlled.      Name of GYN Physician: [facility]  OBHx: SAB x3, D&C x1, CS x1  GynHx: Denies fibroids, cysts, endometriosis, STI's, Abnormal pap smears   PMH: asthma, nephrolithiasis  PSH: CS x1, D&C x1  Meds: Tylenol, Motrin, PNV  Allx: NKDA  Social History:  Denies smoking use, drug use, alcohol use.      Vital Signs Last 24 Hrs  T(C): 36.5 (2024 17:33), Max: 36.5 (2024 17:33)  T(F): 97.7 (2024 17:33), Max: 97.7 (2024 17:33)  HR: 92 (2024 17:33) (92 - 92)  BP: 119/75 (2024 17:33) (119/75 - 119/75)  BP(mean): --  RR: 18 (2024 17:33) (18 - 18)  SpO2: 99% (2024 17:33) (99% - 99%)    Parameters below as of 2024 17:33  Patient On (Oxygen Delivery Method): room air        Physical Exam:   General: lying comfortably in bed, NAD   HEENT: full ROM  CV: well perfused  Lungs: breathing comfortably on RA  Abd: Soft, non-tender, non-distended.    Incision: 1cm defect just R of center of pfannenstiel incision  :  scant bleeding on pad.     Speculum Exam: deferred   Ext: non-tender b/l, no edema     LABS:                            11.1   10.28 )-----------( 629      ( 2024 19:50 )             34.9         140  |  105  |  17  ----------------------------<  91  4.5   |  23  |  0.56    Ca    9.4      2024 19:50    TPro  7.2  /  Alb  3.8  /  TBili  <0.2  /  DBili  x   /  AST  10  /  ALT  14  /  AlkPhos  131<H>      I&O's Detail      Urinalysis Basic - ( 2024 19:50 )    Color: x / Appearance: x / SG: x / pH: x  Gluc: 91 mg/dL / Ketone: x  / Bili: x / Urobili: x   Blood: x / Protein: x / Nitrite: x   Leuk Esterase: x / RBC: x / WBC x   Sq Epi: x / Non Sq Epi: x / Bacteria: x        RADIOLOGY & ADDITIONAL STUDIES:

## 2024-04-27 NOTE — ED PROVIDER NOTE - PHYSICAL EXAMINATION
PHYSICAL EXAM:  CONSTITUTIONAL:  NAD nontoxic.  HEAD: Atraumatic  EYES: Clear bilaterally, pupils equal, round and reactive to light.  CARDIAC: Normal rate, regular rhythm. +S1/S2. No murmurs, rubs or gallops.  RESPIRATORY: Breathing unlabored. Breath sounds clear and equal bilaterally.  ABDOMEN:   transverse lower abdominal scar visibility with 1 cm of right-sided dehiscence packed with gauze, no surrounding erythema or purulent discharge PHYSICAL EXAM:  CONSTITUTIONAL:  NAD nontoxic.  HEAD: Atraumatic  EYES: Clear bilaterally, pupils equal, round and reactive to light.  CARDIAC: Normal rate, regular rhythm. +S1/S2. No murmurs, rubs or gallops.  RESPIRATORY: Breathing unlabored. Breath sounds clear and equal bilaterally.  ABDOMEN:   transverse lower abdominal scar visibility with 1 cm of right-sided dehiscence packed with gauze, no surrounding erythema or purulent discharge    Attending/Luba: Well-appearing, NAD; PERRL/EOMI, non-icterus, supple, no ALEXIA, no JVD, RRR, CTAB; Abd-soft, NT/ND, surgical site/wound- ~1cm right-sided dehiscence with gauze, no d/c, no surrounding erythema, min STS; no HSM; no LE edema, A&Ox3, nonfocal; Skin-warm/dry

## 2024-04-27 NOTE — CONSULT NOTE ADULT - ASSESSMENT
THIS NOTE IS INCOMPLETE 38y  POD#13 s/p pCS for category 2 tracing presents to the ED for wound care. Incision noted to be healing well with no erythema or purulent drainage. VSS.     - iodoform packing removed, re-packed with aquacell  - Pt to have traveling nurse come Monday to take over wound care  - All questions answered to the apparent satisfaction of the patient    Pt seen and evaluated with Dr. Anai Melgar PGY2

## 2024-04-27 NOTE — ED PROVIDER NOTE - NSFOLLOWUPINSTRUCTIONS_ED_ALL_ED_FT
no You are seen in the emergency department for wound care.  OB/GYN came and dressed your wound.  Please return to the emergency department for any worsening in your wound and please follow-up with OB/GYN as directed.  return to the ER for any fever change in the discharge or worsening pain.

## 2024-04-27 NOTE — CONSULT NOTE ADULT - ATTENDING COMMENTS
Wound evaluated.  Minimal drainage with no signs or sx of infection.  1.5cm wide and 1cm depth with 2cm opening under skin on either side.  Wound cleaned and packed with Aquacel.  Pt tolerated well.  Plan for wound care with VNS

## 2024-04-27 NOTE — ED PROVIDER NOTE - CLINICAL SUMMARY MEDICAL DECISION MAKING FREE TEXT BOX
38-year-old female  recent  here on the  presenting with concern for wound dehiscence.   Vital signs nonactionable.  Exam significant for 1 cm area of dehiscence to the transverse lower abdominal  wound.  Will consult OB/GYN for further wound care

## 2024-04-27 NOTE — ED ADULT TRIAGE NOTE - CHIEF COMPLAINT QUOTE
c/o wound dehiscence and purulent drainage s/p C section on 4/14. Was seen here yesterday at L&D floor and was instructed to follow up for wound care today in ER.

## 2024-04-27 NOTE — ED PROVIDER NOTE - OBJECTIVE STATEMENT
38-year-old female  recent  here on the  presenting with concern for wound dehiscence.  Patient states she was seen last night for the same issue and instructed to return to the ED for further wound care.  Denies fever or worsening pain purulent discharge malodorous vaginal discharge urinary symptoms. 38-year-old female  recent  here on the  presenting with concern for wound dehiscence.  Patient states she was seen last night for the same issue and instructed to return to the ED for further wound care.  Denies fever or worsening pain purulent discharge malodorous vaginal discharge urinary symptoms.    Attending/Luba: 37 yo F as described above returns to the emergency department for wound check. Denies fever/chills, pain or d/c.

## 2024-04-28 ENCOUNTER — EMERGENCY (EMERGENCY)
Facility: HOSPITAL | Age: 39
LOS: 1 days | Discharge: HOME | End: 2024-04-28
Admitting: STUDENT IN AN ORGANIZED HEALTH CARE EDUCATION/TRAINING PROGRAM
Payer: MEDICAID

## 2024-04-28 VITALS
DIASTOLIC BLOOD PRESSURE: 74 MMHG | TEMPERATURE: 98 F | HEIGHT: 62 IN | HEART RATE: 93 BPM | RESPIRATION RATE: 18 BRPM | SYSTOLIC BLOOD PRESSURE: 124 MMHG | OXYGEN SATURATION: 100 %

## 2024-04-28 DIAGNOSIS — Z98.890 OTHER SPECIFIED POSTPROCEDURAL STATES: Chronic | ICD-10-CM

## 2024-04-28 PROCEDURE — 99283 EMERGENCY DEPT VISIT LOW MDM: CPT

## 2024-04-28 NOTE — ED PROVIDER NOTE - PATIENT PORTAL LINK FT
You can access the FollowMyHealth Patient Portal offered by [facility] by registering at the following website: [email or web address]. By joining [employer]’s FollowMyHealth portal, you will also be able to view your health information using other applications (apps) compatible with our system.

## 2024-04-28 NOTE — ED ADULT TRIAGE NOTE - CHIEF COMPLAINT QUOTE
pt coming to ED for wound cleaning.  pt is s/p  14 days ago, was intructed to come to ED saturday and  for wound care.  Hx:

## 2024-04-28 NOTE — CONSULT NOTE ADULT - ASSESSMENT
39yo  POD#14 from [facility] for category 2 tracing c/b PPH (QBL 1819 s/p 3U of pRBCs), endometritis, and chorioamnionitis who presents to the ED for follow-up wound care. Patient afebrile on evaluation, with no new complaints from prior evaluation, or signs of infection on evaluation. Wound care was performed (incision was irrigated with saline and re-packed w/ 1/4in Iodoform) and patient tolerated the procedure well.     Recommendations  - No acute GYN interventions and patient appropriate for dc   - Patient instructed to follow-up tomorrow in the clinic for repeat wound care and to coordinate home services (clinic emailed regarding patient)    Trell Blair  PGY-2, Obstetrics & Gynecology     d/w Dr. EPHRAIM Malloy, service attending

## 2024-04-28 NOTE — CONSULT NOTE ADULT - SUBJECTIVE AND OBJECTIVE BOX
IMCHELL YEPEZ  38y  Female [id number]    HPI: 37yo  POD#14 from [facility] for category 2 tracing c/b PPH (QBL 1819 s/p 3U of pRBCs), endometritis, and chorioamnionitis who presents to the ED for follow-up wound care. Patient presented on  initially with serosanguinous drainage from her incision and was found to have a 1-2cm defect. Dressing was changed yesterday (). Patient denies any fevers, redness, purulent drainage, or any other complaints. Says her abdominal pain is controlled     Name of Ob/Gyn Physician: TANESHA BENNETT    POB: SAB x3, D&C x1, CS x1 (see above)  PMH: asthma, nephrolithiasis  PSH: CS x1, D&C x1  Meds: Tylenol, Motrin, PNV  Allx: NKDA  Social History: Denies smoking use, drug use, alcohol use    Vital Signs Last 24 Hrs  T(C): 36.8 (2024 17:31), Max: 36.8 (2024 17:31)  T(F): 98.2 (2024 17:31), Max: 98.2 (2024 17:31)  HR: 93 (2024 17:31) (93 - 93)  BP: 124/74 (2024 17:31) (124/74 - 124/74)  BP(mean): --  RR: 18 (2024 17:31) (18 - 18)  SpO2: 100% (2024 17:31) (100% - 100%)    Parameters below as of 2024 17:31  Patient On (Oxygen Delivery Method): room air        Physical Exam:   General: Awake. Alert. No acute distress   Lungs: Unlabored breathing. No respiratory distress   Abd: Soft, non-tender, and non-distended  Incision: ~1cm defect just right of the center of her abdomen. No active drainage seen from the incision. No adjacent erythema or tenderness along the incision. No purulent drainage seen from the incision   Ext: No calf tenderness bilaterally    LABS:                              11.1   10.28 )-----------( 629      ( 2024 19:50 )             34.9     04-26    140  |  105  |  17  ----------------------------<  91  4.5   |  23  |  0.56    Ca    9.4      2024 19:50    TPro  7.2  /  Alb  3.8  /  TBili  <0.2  /  DBili  x   /  AST  10  /  ALT  14  /  AlkPhos  131<H>  26    I&O's Detail      Urinalysis Basic - ( 2024 19:50 )    Color: x / Appearance: x / SG: x / pH: x  Gluc: 91 mg/dL / Ketone: x  / Bili: x / Urobili: x   Blood: x / Protein: x / Nitrite: x   Leuk Esterase: x / RBC: x / WBC x   Sq Epi: x / Non Sq Epi: x / Bacteria: x        RADIOLOGY & ADDITIONAL STUDIES:

## 2024-04-28 NOTE — ED PROVIDER NOTE - CLINICAL SUMMARY MEDICAL DECISION MAKING FREE TEXT BOX
39 yo female s/p  POD#14 presents to ED for wound check.  Was seen in ED for wound dehiscence, had wound packed and told to return to ED today to be checked.  Pt supposed to have VNS starting tomorrow.  DEnies any pain, fever, vomiting, weakness.   GYN paged.

## 2024-04-28 NOTE — ED PROVIDER NOTE - NSFOLLOWUPINSTRUCTIONS_ED_ALL_ED_FT
Follow up with GYN clinic tomorrow.   Return to ED for any fever, severe abdominal pain, excessive drainage/bleeding from surgical site or any other concerning symptoms.

## 2024-04-28 NOTE — ED PROVIDER NOTE - OBJECTIVE STATEMENT
37 yo female s/p  POD#14 presents to ED for wound check.  Was seen in ED for wound dehiscence, had wound packed and told to return to ED today to be checked.  Pt supposed to have VNS starting tomorrow.  DEnies any pain, fever, vomiting, weakness.

## 2024-04-29 ENCOUNTER — OUTPATIENT (OUTPATIENT)
Dept: OUTPATIENT SERVICES | Facility: HOSPITAL | Age: 39
LOS: 1 days | End: 2024-04-29

## 2024-04-29 ENCOUNTER — APPOINTMENT (OUTPATIENT)
Dept: OBGYN | Facility: HOSPITAL | Age: 39
End: 2024-04-29
Payer: MEDICAID

## 2024-04-29 VITALS
BODY MASS INDEX: 38.83 KG/M2 | WEIGHT: 211 LBS | HEIGHT: 62 IN | SYSTOLIC BLOOD PRESSURE: 113 MMHG | DIASTOLIC BLOOD PRESSURE: 68 MMHG | HEART RATE: 93 BPM | TEMPERATURE: 98.1 F

## 2024-04-29 DIAGNOSIS — Z98.890 OTHER SPECIFIED POSTPROCEDURAL STATES: Chronic | ICD-10-CM

## 2024-04-29 PROCEDURE — 99213 OFFICE O/P EST LOW 20 MIN: CPT | Mod: TH,GE

## 2024-04-29 RX ORDER — SULFAMETHOXAZOLE AND TRIMETHOPRIM 800; 160 MG/1; MG/1
800-160 TABLET ORAL TWICE DAILY
Qty: 14 | Refills: 0 | Status: ACTIVE | COMMUNITY
Start: 2024-04-29 | End: 1900-01-01

## 2024-04-30 ENCOUNTER — APPOINTMENT (OUTPATIENT)
Dept: OBGYN | Facility: HOSPITAL | Age: 39
End: 2024-04-30

## 2024-04-30 ENCOUNTER — OUTPATIENT (OUTPATIENT)
Dept: OUTPATIENT SERVICES | Facility: HOSPITAL | Age: 39
LOS: 1 days | End: 2024-04-30

## 2024-04-30 VITALS
HEART RATE: 92 BPM | WEIGHT: 211 LBS | BODY MASS INDEX: 38.83 KG/M2 | DIASTOLIC BLOOD PRESSURE: 75 MMHG | HEIGHT: 62 IN | TEMPERATURE: 97.8 F | SYSTOLIC BLOOD PRESSURE: 113 MMHG

## 2024-04-30 DIAGNOSIS — Z00.00 ENCOUNTER FOR GENERAL ADULT MEDICAL EXAMINATION W/OUT ABNORMAL FINDINGS: ICD-10-CM

## 2024-04-30 PROCEDURE — 99024 POSTOP FOLLOW-UP VISIT: CPT

## 2024-05-01 ENCOUNTER — APPOINTMENT (OUTPATIENT)
Dept: OBGYN | Facility: HOSPITAL | Age: 39
End: 2024-05-01

## 2024-05-04 NOTE — PLAN
[FreeTextEntry1] : Patient is a 37yo  POD#15 from [facility] for cat 2 tracing c/b PPH (QBL 1819 s/p 3uPRBC), endometritis, and chorioamnionitis here for f/u wound care.  #Wound care - Wound repacked with 1/4 inch iodoform - Discussed with home nursing, will likely be able to start seeing patient tomorrow. Will make appointment for tomorrow in case home nursing is unable to come, otherwise will f/u in 1 week for wound check - Continue 7 days Bactrim BID  WILIAM Perdue PGY1 patient seen and d/w Dr. Johnston

## 2024-05-04 NOTE — HISTORY OF PRESENT ILLNESS
[FreeTextEntry1] : Patient is a 39yo  POD#16 from [facility] for cat 2 tracing c/b PPH (QBL 1819 s/p 3uPRBC), endometritis, and chorioamnioitis here for f/u wound dehiscence.  Patient presented initially on  to the ED with serosanguinous drainage from her incision found to have 1-2cm defect. Dressing was changes on  and again on  and packed with 1/4 inch iodoform. The patient was seen in clinic yesterday, and the wound was changed and again packed with 1/4 inch iodoform. Today, the patient again endorses incisional pain, but denies purulence, fevers, chills, abdominal pain, abnormal discharge, or heavy drainage.  She is otherwise feeling well, meeting all postoperative milestones. She is voiding, ambulating, passing flatus and having bowel movements, tolerating PO. She is bonding well with baby and is both breast and bottle feeding. She started taking the Bactrim yesterday with no issues.

## 2024-05-04 NOTE — PHYSICAL EXAM
[Appropriately responsive] : appropriately responsive [Alert] : alert [No Acute Distress] : no acute distress [Soft] : soft [Non-tender] : non-tender [Non-distended] : non-distended [Oriented x3] : oriented x3 [FreeTextEntry7] :  incision with small 1.5cm defect on the right side of the incision with iodoform packing in place. Small serosanguinous drainage noted on gauze pad. Incision slightly red and firm, but nontender and no signs of additional dehiscence or infection.

## 2024-05-06 ENCOUNTER — TRANSCRIPTION ENCOUNTER (OUTPATIENT)
Age: 39
End: 2024-05-06

## 2024-05-08 ENCOUNTER — APPOINTMENT (OUTPATIENT)
Dept: OBGYN | Facility: HOSPITAL | Age: 39
End: 2024-05-08
Payer: MEDICAID

## 2024-05-08 ENCOUNTER — OUTPATIENT (OUTPATIENT)
Dept: OUTPATIENT SERVICES | Facility: HOSPITAL | Age: 39
LOS: 1 days | End: 2024-05-08

## 2024-05-08 VITALS
BODY MASS INDEX: 38.28 KG/M2 | SYSTOLIC BLOOD PRESSURE: 98 MMHG | WEIGHT: 208 LBS | HEART RATE: 88 BPM | TEMPERATURE: 97.9 F | DIASTOLIC BLOOD PRESSURE: 63 MMHG | HEIGHT: 62 IN

## 2024-05-08 DIAGNOSIS — B37.2 CANDIDIASIS OF SKIN AND NAIL: ICD-10-CM

## 2024-05-08 DIAGNOSIS — Z98.890 OTHER SPECIFIED POSTPROCEDURAL STATES: Chronic | ICD-10-CM

## 2024-05-08 PROCEDURE — 99213 OFFICE O/P EST LOW 20 MIN: CPT | Mod: GC

## 2024-05-08 RX ORDER — CLOTRIMAZOLE AND BETAMETHASONE DIPROPIONATE 10; .5 MG/G; MG/G
1-0.05 CREAM TOPICAL TWICE DAILY
Qty: 1 | Refills: 0 | Status: ACTIVE | COMMUNITY
Start: 2024-05-08 | End: 1900-01-01

## 2024-05-08 NOTE — PROCEDURE
[Packing placed] : packing placed [Right] : right [No Premedication] : no premedication [Tolerated Well] : The patient tolerated the procedure well [No Complications] : There were no complications

## 2024-05-09 PROBLEM — B37.2 CANDIDA INFECTION OF FLEXURAL SKIN: Status: ACTIVE | Noted: 2024-05-08

## 2024-05-09 NOTE — PHYSICAL EXAM
[Chaperone Present] : A chaperone was present in the examining room during all aspects of the physical examination [Appropriately responsive] : appropriately responsive [Non-tender] : non-tender [Oriented x3] : oriented x3 [FreeTextEntry7] : 0.5in incisional defect with pink healthy tissue, surrounding skin pink, irritated, and raised outlining dressing location

## 2024-05-09 NOTE — HISTORY OF PRESENT ILLNESS
[FreeTextEntry1] : Patient is a 39yo  POD#24 from [facility] for cat 2 tracing c/b PPH (QBL 1819 s/p 3uPRBC), endometritis, and chorioamnioitis here for f/u wound dehiscence.  Patient presented on  to the ED with serosanguinous drainage from her incision found to have 1-2cm defect. Dressing changed on  and again on  and packed with 1/4 inch iodoform. The patient was seen in clinic on  and , and the wound was changed and packed with 1/4 inch iodoform on both days. She was sent home with Rx for Bactrim BID x8d. She has finished her course of Bactrim.  Today, the patient endorses occasional incisional pain while sitting. She denies fevers, chills, abdominal pain, abnormal discharge, or heavy drainage. She reports vaginal bleeding has decreased. She is voiding, ambulating, passing flatus and having bowel movements, tolerating PO. She is bonding well with baby and is both breast and bottle feeding.

## 2024-05-09 NOTE — REVIEW OF SYSTEMS
[Abdominal Pain] : abdominal pain [Negative] : Constitutional [Constipation] : no constipation [Diarrhea] : diarrhea [Vomiting] : no vomiting [Bloating] : no bloating [Nausea] : no nausea

## 2024-05-09 NOTE — PLAN
[FreeTextEntry1] : 37yo  POD#24 from [facility] for cat 2 tracing c/b PPH (QBL 1819 s/p 3uPRBC), endometritis, and chorioamnionitis here for f/u wound dehiscence.  #Wound dehiscence - Would repacked today with 1/4in Iodoform packing - Yeast infection surrounding incision, Rx clotrimazole-betamethasone cream sent to pharmacy and pt instructed to apply cream over irritated area QD - Pt educated on keeping wound dry - Pt to follow up on Monday to reevaluate wound  Dr. Pickens at bedside during evaluation Teresa Hernandez PGY1

## 2024-05-13 ENCOUNTER — APPOINTMENT (OUTPATIENT)
Dept: OBGYN | Facility: HOSPITAL | Age: 39
End: 2024-05-13
Payer: MEDICAID

## 2024-05-13 ENCOUNTER — OUTPATIENT (OUTPATIENT)
Dept: OUTPATIENT SERVICES | Facility: HOSPITAL | Age: 39
LOS: 1 days | End: 2024-05-13

## 2024-05-13 VITALS
HEIGHT: 62 IN | HEART RATE: 88 BPM | SYSTOLIC BLOOD PRESSURE: 109 MMHG | WEIGHT: 207 LBS | TEMPERATURE: 97.9 F | BODY MASS INDEX: 38.09 KG/M2 | DIASTOLIC BLOOD PRESSURE: 63 MMHG

## 2024-05-13 DIAGNOSIS — Z98.890 OTHER SPECIFIED POSTPROCEDURAL STATES: Chronic | ICD-10-CM

## 2024-05-13 PROCEDURE — 99212 OFFICE O/P EST SF 10 MIN: CPT | Mod: GC

## 2024-05-13 NOTE — HISTORY OF PRESENT ILLNESS
[FreeTextEntry1] : Patient is a 39yo  POD#15 from \[facility]\"" for cat 2 tracing c/b PPH (QBL 1819 s/p 3uPRBC), endometritis, and chorioamnioitis here for f/u wound dehiscence.  Patient presented initially on  to the ED with serosanguinous drainage from her incision found to have 1-2cm defect. Dressing was changes on  and again on  and packed with 1/4 inch iodoform. Today, the patient endorses incisional pain, but denies purulence, fevers, chills, abdominal pain, abnormal discharge, or heavy drainage. She has some serosanguinous drainage and it is slightly foul smelling. She is otherwise feeling well, meeting all postoperative milestones. She is voiding, ambulating, passing flatus and having bowel movements, tolerating PO. She is bonding well with baby and is both breast and bottle feeding.

## 2024-05-13 NOTE — PHYSICAL EXAM
[Chaperone Declined] : Patient declined chaperone [Appropriately responsive] : appropriately responsive [Alert] : alert [No Acute Distress] : no acute distress [Soft] : soft [Non-tender] : non-tender [Oriented x3] : oriented x3 [FreeTextEntry7] :  wound with small 1.5cm defect on the right side of the incision with iodoform packing in place. Small serosanguinous drainage noted on gauze pad. Incision slightly red and firm, but nontender and no signs of additional dehiscence or infection.

## 2024-05-13 NOTE — PLAN
[FreeTextEntry1] : Patient is a 37yo  POD#15 from [facility] for cat 2 c/b PPH (s/p 3uPRBC), chorioamnionitis, and endometritis, now complicated by post-operative wound dehiscence here for wound care.  #Wound care - wound repacked with 1/4 inch iodoform packing as above - Patient sent 7 days of Bactrim BID as wound slightly foul smelling, otherwise no si/sxs infection - RTC 1 day for wound care - Home care referral sent for home nursing for daily wound changes  L Nette PGY1 d/w Dr. Elena

## 2024-05-14 DIAGNOSIS — Z51.89 ENCOUNTER FOR OTHER SPECIFIED AFTERCARE: ICD-10-CM

## 2024-05-15 NOTE — PHYSICAL EXAM
[FreeTextEntry7] : Incision with 2x1 cm area of dehiscence. Slight surrounding erythema, no drainage.

## 2024-05-15 NOTE — PLAN
[FreeTextEntry1] : 37yo  POD#29 from [facility] for cat 2 tracing c/b PPH (QBL 1819 s/p 3uPRBC), endometritis, and chorioamnionitis here for f/u wound dehiscence.  #Wound dehiscence - Would repacked today with 1/4in Iodoform packing - Yeast infection resolving, patient advised to continue with clotrimazole-betamethasone cream over irritated area QD - Pt educated on keeping wound dry - Pt to follow up per routine for packings.   Moni Moraes, PGY1 D/w Dr. Elena

## 2024-05-15 NOTE — HISTORY OF PRESENT ILLNESS
[FreeTextEntry1] : Patient is a 37yo  POD#29 from [facility] for cat 2 tracing c/b PPH (QBL 1819 s/p 3uPRBC), endometritis, and chorioamnioitis here for f/u wound dehiscence.  Patient presented on  to the ED with serosanguinous drainage from her incision found to have 1-2cm defect. Dressing changed on  and again on  and packed with 1/4 inch iodoform. The patient was seen in clinic on  and , and the wound was changed and packed with 1/4 inch iodoform on both days. She was sent home with Rx for Bactrim BID x8d. She has finished her course of Bactrim.  Today, the patient endorses occasional incisional pain. She denies fevers, chills, abdominal pain, abnormal discharge, or heavy drainage. She reports vaginal bleeding has decreased. She is voiding, ambulating, passing flatus and having bowel movements, tolerating PO. She is bonding well with baby and is both breast and bottle feeding.

## 2024-05-17 LAB — SURGICAL PATHOLOGY STUDY: SIGNIFICANT CHANGE UP

## 2024-05-20 ENCOUNTER — APPOINTMENT (OUTPATIENT)
Dept: OBGYN | Facility: HOSPITAL | Age: 39
End: 2024-05-20
Payer: MEDICAID

## 2024-05-20 ENCOUNTER — OUTPATIENT (OUTPATIENT)
Dept: OUTPATIENT SERVICES | Facility: HOSPITAL | Age: 39
LOS: 1 days | End: 2024-05-20

## 2024-05-20 VITALS
DIASTOLIC BLOOD PRESSURE: 62 MMHG | HEIGHT: 62 IN | HEART RATE: 83 BPM | TEMPERATURE: 98 F | WEIGHT: 207 LBS | BODY MASS INDEX: 38.09 KG/M2 | SYSTOLIC BLOOD PRESSURE: 105 MMHG

## 2024-05-20 DIAGNOSIS — Z98.890 OTHER SPECIFIED POSTPROCEDURAL STATES: Chronic | ICD-10-CM

## 2024-05-20 DIAGNOSIS — Z30.09 ENCOUNTER FOR OTHER GENERAL COUNSELING AND ADVICE ON CONTRACEPTION: ICD-10-CM

## 2024-05-20 PROCEDURE — 99212 OFFICE O/P EST SF 10 MIN: CPT | Mod: TH,GE

## 2024-05-20 RX ORDER — NORETHINDRONE 0.35 MG/1
0.35 TABLET ORAL DAILY
Qty: 3 | Refills: 0 | Status: ACTIVE | COMMUNITY
Start: 2024-05-20 | End: 1900-01-01

## 2024-05-22 ENCOUNTER — APPOINTMENT (OUTPATIENT)
Dept: OBGYN | Facility: HOSPITAL | Age: 39
End: 2024-05-22

## 2024-05-22 NOTE — PHYSICAL EXAM
[Chaperone Present] : A chaperone was present in the examining room during all aspects of the physical examination [Appropriately responsive] : appropriately responsive [Alert] : alert [No Acute Distress] : no acute distress [Soft] : soft [Non-tender] : non-tender [Non-distended] : non-distended [Oriented x3] : oriented x3 [Examination Of The Breasts] : a normal appearance [No Masses] : no breast masses were palpable [Normal] : normal [FreeTextEntry2] : Ashley [FreeTextEntry5] : nonlabored breathing on RA [FreeTextEntry6] : nontender, no masses, no abnormal nipple discharge [FreeTextEntry7] : incision dehiscence healing well--1cm area of unhealed tissue, probed w/o any depth to area, no abnormal discharge from area, small scattered areas of erythema surrounding incision (improved per patient)

## 2024-05-22 NOTE — DISCUSSION/SUMMARY
[FreeTextEntry1] : Patient is a 39yo  POD#36 from [facility] for cat 2 tracing c/b PPH (QBL 1819 s/p 3uPRBC), endometritis, and chorioamnioitis here for f/u wound dehiscence & postpartum appointment doing well with improvement of symptoms.  -pt to continue cleaning wound, no longer requiring home VNS, will continue to monitor healing process -continue use of clotrimazole-betamethasone PRN -Rx for POPs sent to pts pharmacy -pt has therapy referral available, will reach out when she feels ready to discuss her experience -pt to RTC in 4wks for wound check (or sooner if needed)  Ashlie, PGY4 D/w Dr. Elena

## 2024-05-22 NOTE — PLAN
[FreeTextEntry1] : 39yo  POD#29 from [facility] for cat 2 tracing c/b PPH (QBL 1819 s/p 3uPRBC), endometritis, and chorioamnionitis here for f/u wound dehiscence.  #Wound dehiscence - Would repacked today with 1/4in Iodoform packing - Yeast infection resolving, patient advised to continue with clotrimazole-betamethasone cream over irritated area QD - Pt educated on keeping wound dry - Pt to follow up per routine for packings.   Moni Moraes, PGY1 D/w Dr. Elena

## 2024-05-22 NOTE — HISTORY OF PRESENT ILLNESS
[FreeTextEntry1] : Patient is a 37yo  POD#36 from [facility] for cat 2 tracing c/b PPH (QBL 1819 s/p 3uPRBC), endometritis, and chorioamnioitis here for f/u wound dehiscence & for postpartum visit.  Patient presented on  to the ED with serosanguinous drainage from her incision found to have 1-2cm defect. Dressing changed on  and again on  and packed with 1/4 inch iodoform. The patient was seen in clinic on  and , and the wound was changed and packed with 1/4 inch iodoform on both days. She was sent home with Rx for Bactrim BID x8d which she completed. Pt was seen again  for dressing change.  Pt states the wound packing recently fell out & that her home VNS states the wound no longer needs packing & that it is healing well. She denies fevers, chills, abdominal pain, abnormal discharge, or heavy drainage. She reports vaginal bleeding has decreased. She is voiding, ambulating, passing flatus and having bowel movements, tolerating PO. She is bonding well with baby and is both breast and bottle feeding. Pt would like to start taking POPs for contraception.  Pt admits to feeling overwhelmed by the events that occurred during her peripartum period. She feels overwhelmed by what she went through and does not want to have kids again anytime soon because of the trauma she experienced. Pt has access to a therapist but is not yet ready to talk about everything that happened in depth. Denies suicidal or homicidal ideations at this time. Aware that she needs to call 911 if she begins experiencing the above thoughts.

## 2024-06-13 ENCOUNTER — NON-APPOINTMENT (OUTPATIENT)
Age: 39
End: 2024-06-13

## 2024-06-19 ENCOUNTER — OUTPATIENT (OUTPATIENT)
Dept: OUTPATIENT SERVICES | Facility: HOSPITAL | Age: 39
LOS: 1 days | End: 2024-06-19

## 2024-06-19 ENCOUNTER — APPOINTMENT (OUTPATIENT)
Dept: OBGYN | Facility: HOSPITAL | Age: 39
End: 2024-06-19
Payer: MEDICAID

## 2024-06-19 VITALS
HEIGHT: 62 IN | TEMPERATURE: 98 F | BODY MASS INDEX: 37.17 KG/M2 | SYSTOLIC BLOOD PRESSURE: 114 MMHG | DIASTOLIC BLOOD PRESSURE: 74 MMHG | WEIGHT: 202 LBS | HEART RATE: 91 BPM

## 2024-06-19 DIAGNOSIS — Z98.890 OTHER SPECIFIED POSTPROCEDURAL STATES: Chronic | ICD-10-CM

## 2024-06-19 PROCEDURE — 99213 OFFICE O/P EST LOW 20 MIN: CPT | Mod: GC

## 2024-06-20 DIAGNOSIS — Z51.89 ENCOUNTER FOR OTHER SPECIFIED AFTERCARE: ICD-10-CM

## 2024-06-30 NOTE — PHYSICAL EXAM
[Chaperone Present] : A chaperone was present in the examining room during all aspects of the physical examination [Appropriately responsive] : appropriately responsive [Alert] : alert [No Acute Distress] : no acute distress [FreeTextEntry7] : generally well-healed pfannenstiel incision, small 3mm area of raw skin noted however no induration or swelling or track noted

## 2024-06-30 NOTE — PLAN
[FreeTextEntry1] : 40 y/o  s/p pLTCS on  2/ Cat II c/b post-op wound separation. Patient previously seen in ED and clinic for management with PO antibiotics and packing and has not required either of the two for the past month and a half.  #Wound Care - Wound well-healed at this visit - Pt counseled on continuing to watch and keep clean and dry.  #Postpartum Care - Desires progesterone only pills, prescription to be resent  d/w JANEE fellow Phuc DWYER PGY6 Birgit Damon MD PGY3  MIGS Fellow Addendum Patient seen and examined with resident. Agree with above. Patient presenting for postop sound separation 2/ c/s. The patient denies any sxs of infection such as F/C/N/V/incisional erythema or pain. Incision examined and noted to have small (~3mm area) with exposed skin/dermis on anterior aspect of incision, however incision is closed with no surrounding erythema or induration. Precautions given to patient. Recommended to keep dry as much as possible however incision is closed and otherwise healing well. Desires POP for contraception which were sent to pharmacy on file. To return for routine care.  D/W Dr. Breana Friend, FMIGS2

## 2024-06-30 NOTE — HISTORY OF PRESENT ILLNESS
[FreeTextEntry1] : 38 y/o  s/p pLTCS on  Cat II c/b post-op wound separation. Patient previously seen in ED and clinic for management with PO antibiotics and packing and has not required either of the two for the past month and a half.  Patient states that she is doing well today. Denies fevers, chills, abdominal pain. Patient is passing gas, ambulating, performing daily activities normally. Has occasionally been speaking to her sister's therapist and describes her mood as "good". Has had her first menses since her  section.

## 2024-10-14 ENCOUNTER — RESULT REVIEW (OUTPATIENT)
Age: 39
End: 2024-10-14

## 2024-10-14 ENCOUNTER — OUTPATIENT (OUTPATIENT)
Dept: OUTPATIENT SERVICES | Facility: HOSPITAL | Age: 39
LOS: 1 days | End: 2024-10-14

## 2024-10-14 ENCOUNTER — APPOINTMENT (OUTPATIENT)
Dept: OBGYN | Facility: HOSPITAL | Age: 39
End: 2024-10-14

## 2024-10-14 VITALS
TEMPERATURE: 98.4 F | HEART RATE: 87 BPM | BODY MASS INDEX: 36.8 KG/M2 | HEIGHT: 62 IN | SYSTOLIC BLOOD PRESSURE: 146 MMHG | DIASTOLIC BLOOD PRESSURE: 80 MMHG | WEIGHT: 200 LBS

## 2024-10-14 DIAGNOSIS — Z32.00 ENCOUNTER FOR PREGNANCY TEST, RESULT UNKNOWN: ICD-10-CM

## 2024-10-14 DIAGNOSIS — Z98.890 OTHER SPECIFIED POSTPROCEDURAL STATES: Chronic | ICD-10-CM

## 2024-10-14 DIAGNOSIS — N89.8 OTHER SPECIFIED NONINFLAMMATORY DISORDERS OF VAGINA: ICD-10-CM

## 2024-10-14 PROCEDURE — 99213 OFFICE O/P EST LOW 20 MIN: CPT | Mod: TH,GE

## 2024-10-15 LAB
C TRACH RRNA SPEC QL NAA+PROBE: SIGNIFICANT CHANGE UP
CANDIDA AB TITR SER: DETECTED
G VAGINALIS DNA SPEC QL NAA+PROBE: SIGNIFICANT CHANGE UP
N GONORRHOEA RRNA SPEC QL NAA+PROBE: SIGNIFICANT CHANGE UP
SPECIMEN SOURCE: SIGNIFICANT CHANGE UP
T VAGINALIS SPEC QL WET PREP: SIGNIFICANT CHANGE UP

## 2024-10-17 LAB
-  AMOXICILLIN/CLAVULANIC ACID: SIGNIFICANT CHANGE UP
-  AMPICILLIN/SULBACTAM: SIGNIFICANT CHANGE UP
-  AMPICILLIN: SIGNIFICANT CHANGE UP
-  AZTREONAM: SIGNIFICANT CHANGE UP
-  CEFAZOLIN: SIGNIFICANT CHANGE UP
-  CEFEPIME: SIGNIFICANT CHANGE UP
-  CEFOXITIN: SIGNIFICANT CHANGE UP
-  CEFTRIAXONE: SIGNIFICANT CHANGE UP
-  CEFUROXIME: SIGNIFICANT CHANGE UP
-  CIPROFLOXACIN: SIGNIFICANT CHANGE UP
-  ERTAPENEM: SIGNIFICANT CHANGE UP
-  GENTAMICIN: SIGNIFICANT CHANGE UP
-  IMIPENEM: SIGNIFICANT CHANGE UP
-  LEVOFLOXACIN: SIGNIFICANT CHANGE UP
-  MEROPENEM: SIGNIFICANT CHANGE UP
-  NITROFURANTOIN: SIGNIFICANT CHANGE UP
-  PIPERACILLIN/TAZOBACTAM: SIGNIFICANT CHANGE UP
-  TOBRAMYCIN: SIGNIFICANT CHANGE UP
-  TRIMETHOPRIM/SULFAMETHOXAZOLE: SIGNIFICANT CHANGE UP
CULTURE RESULTS: ABNORMAL
METHOD TYPE: SIGNIFICANT CHANGE UP
ORGANISM # SPEC MICROSCOPIC CNT: ABNORMAL
ORGANISM # SPEC MICROSCOPIC CNT: ABNORMAL
SPECIMEN SOURCE: SIGNIFICANT CHANGE UP

## 2024-10-21 DIAGNOSIS — N89.8 OTHER SPECIFIED NONINFLAMMATORY DISORDERS OF VAGINA: ICD-10-CM

## 2024-10-21 DIAGNOSIS — Z32.00 ENCOUNTER FOR PREGNANCY TEST, RESULT UNKNOWN: ICD-10-CM

## 2024-10-21 DIAGNOSIS — Z11.1 ENCOUNTER FOR SCREENING FOR RESPIRATORY TUBERCULOSIS: ICD-10-CM

## 2024-10-21 DIAGNOSIS — Z11.3 ENCOUNTER FOR SCREENING FOR INFECTIONS WITH A PREDOMINANTLY SEXUAL MODE OF TRANSMISSION: ICD-10-CM

## 2024-10-24 DIAGNOSIS — B37.31 ACUTE CANDIDIASIS OF VULVA AND VAGINA: ICD-10-CM

## 2024-10-24 DIAGNOSIS — N39.0 URINARY TRACT INFECTION, SITE NOT SPECIFIED: ICD-10-CM

## 2024-10-24 RX ORDER — NITROFURANTOIN (MONOHYDRATE/MACROCRYSTALS) 25; 75 MG/1; MG/1
100 CAPSULE ORAL
Qty: 10 | Refills: 0 | Status: COMPLETED | COMMUNITY
Start: 2024-10-24 | End: 2024-10-29

## 2024-10-24 RX ORDER — FLUCONAZOLE 150 MG/1
150 TABLET ORAL
Qty: 1 | Refills: 0 | Status: ACTIVE | COMMUNITY
Start: 2024-10-24 | End: 1900-01-01

## 2024-11-04 ENCOUNTER — APPOINTMENT (OUTPATIENT)
Dept: OBGYN | Facility: HOSPITAL | Age: 39
End: 2024-11-04

## 2024-11-08 NOTE — ED ADULT NURSE NOTE - SUICIDE SCREENING DEPRESSION
Called patient. She is not out of pills. She only has a little bit left. The patient called [facility] this morning and they said they did not receive the prescription from yesterday. Resent Metformin today again.    Negative

## 2024-11-10 NOTE — ED CDU PROVIDER INITIAL DAY NOTE - PROGRESS NOTE DETAILS
no discharge, no irritation, no pain, no redness, and no visual changes. received sign out from MITCHELL Molina. pt feeling better. tolerating PO. will dc home on cefuroxime.

## 2024-11-11 ENCOUNTER — NON-APPOINTMENT (OUTPATIENT)
Age: 39
End: 2024-11-11

## 2024-12-04 ENCOUNTER — APPOINTMENT (OUTPATIENT)
Dept: OBGYN | Facility: HOSPITAL | Age: 39
End: 2024-12-04
Payer: MEDICAID

## 2024-12-04 ENCOUNTER — OUTPATIENT (OUTPATIENT)
Dept: OUTPATIENT SERVICES | Facility: HOSPITAL | Age: 39
LOS: 1 days | End: 2024-12-04

## 2024-12-04 VITALS
HEART RATE: 81 BPM | TEMPERATURE: 97.7 F | SYSTOLIC BLOOD PRESSURE: 126 MMHG | BODY MASS INDEX: 36.44 KG/M2 | DIASTOLIC BLOOD PRESSURE: 74 MMHG | WEIGHT: 198 LBS | HEIGHT: 62 IN

## 2024-12-04 DIAGNOSIS — R42 DIZZINESS AND GIDDINESS: ICD-10-CM

## 2024-12-04 DIAGNOSIS — Z30.09 ENCOUNTER FOR OTHER GENERAL COUNSELING AND ADVICE ON CONTRACEPTION: ICD-10-CM

## 2024-12-04 DIAGNOSIS — Z98.890 OTHER SPECIFIED POSTPROCEDURAL STATES: Chronic | ICD-10-CM

## 2024-12-04 DIAGNOSIS — B37.2 CANDIDIASIS OF SKIN AND NAIL: ICD-10-CM

## 2024-12-04 PROCEDURE — 99213 OFFICE O/P EST LOW 20 MIN: CPT

## 2024-12-04 RX ORDER — NORETHINDRONE 0.35 MG/1
0.35 TABLET ORAL DAILY
Qty: 3 | Refills: 1 | Status: ACTIVE | COMMUNITY
Start: 2024-12-04 | End: 1900-01-01

## 2024-12-04 RX ORDER — CLOTRIMAZOLE AND BETAMETHASONE DIPROPIONATE 10; .5 MG/G; MG/G
1-0.05 CREAM TOPICAL 3 TIMES DAILY
Qty: 1 | Refills: 2 | Status: ACTIVE | COMMUNITY
Start: 2024-12-04 | End: 1900-01-01

## 2024-12-06 DIAGNOSIS — R42 DIZZINESS AND GIDDINESS: ICD-10-CM

## 2024-12-06 DIAGNOSIS — Z30.09 ENCOUNTER FOR OTHER GENERAL COUNSELING AND ADVICE ON CONTRACEPTION: ICD-10-CM

## 2024-12-06 DIAGNOSIS — B37.2 CANDIDIASIS OF SKIN AND NAIL: ICD-10-CM

## 2024-12-19 ENCOUNTER — OUTPATIENT (OUTPATIENT)
Dept: OUTPATIENT SERVICES | Facility: HOSPITAL | Age: 39
LOS: 1 days | End: 2024-12-19

## 2024-12-19 ENCOUNTER — NON-APPOINTMENT (OUTPATIENT)
Age: 39
End: 2024-12-19

## 2024-12-19 ENCOUNTER — APPOINTMENT (OUTPATIENT)
Dept: INTERNAL MEDICINE | Facility: CLINIC | Age: 39
End: 2024-12-19

## 2024-12-19 VITALS
BODY MASS INDEX: 37.17 KG/M2 | WEIGHT: 202 LBS | DIASTOLIC BLOOD PRESSURE: 70 MMHG | SYSTOLIC BLOOD PRESSURE: 100 MMHG | HEART RATE: 106 BPM | OXYGEN SATURATION: 97 % | HEIGHT: 62 IN

## 2024-12-19 DIAGNOSIS — Z98.890 OTHER SPECIFIED POSTPROCEDURAL STATES: Chronic | ICD-10-CM

## 2024-12-19 DIAGNOSIS — Z00.00 ENCOUNTER FOR GENERAL ADULT MEDICAL EXAMINATION W/OUT ABNORMAL FINDINGS: ICD-10-CM

## 2024-12-19 DIAGNOSIS — R55 SYNCOPE AND COLLAPSE: ICD-10-CM

## 2024-12-19 DIAGNOSIS — Z23 ENCOUNTER FOR IMMUNIZATION: ICD-10-CM

## 2024-12-19 PROCEDURE — 99385 PREV VISIT NEW AGE 18-39: CPT | Mod: 25

## 2024-12-20 LAB
ALBUMIN SERPL ELPH-MCNC: 4.5 G/DL
ALP BLD-CCNC: 138 U/L
ALT SERPL-CCNC: 14 U/L
ANION GAP SERPL CALC-SCNC: 16 MMOL/L
AST SERPL-CCNC: 15 U/L
BILIRUB SERPL-MCNC: <0.2 MG/DL
BUN SERPL-MCNC: 10 MG/DL
CALCIUM SERPL-MCNC: 10.1 MG/DL
CHLORIDE SERPL-SCNC: 104 MMOL/L
CO2 SERPL-SCNC: 20 MMOL/L
CREAT SERPL-MCNC: 0.43 MG/DL
EGFR: 127 ML/MIN/1.73M2
FOLATE SERPL-MCNC: 13.6 NG/ML
GLUCOSE SERPL-MCNC: 94 MG/DL
POTASSIUM SERPL-SCNC: 4.6 MMOL/L
PROT SERPL-MCNC: 7.8 G/DL
SODIUM SERPL-SCNC: 140 MMOL/L
TSH SERPL-ACNC: 2.61 UIU/ML
VIT B12 SERPL-MCNC: 481 PG/ML

## 2024-12-27 DIAGNOSIS — Z00.00 ENCOUNTER FOR GENERAL ADULT MEDICAL EXAMINATION WITHOUT ABNORMAL FINDINGS: ICD-10-CM

## 2024-12-27 DIAGNOSIS — R55 SYNCOPE AND COLLAPSE: ICD-10-CM

## 2025-03-28 NOTE — DISCHARGE NOTE OB - PATIENT PORTAL LINK FT
3/28/2025    Anna Tong is a 51 y.o. female here for:    Chief Complaint   Patient presents with    Establish Care     Previously saw Dr. Ramona Irvin for primary care.    Anxiety     6 month med check        HPI:  Anxiety  - On Paxil 10 mg QD. Reports compliance with medication. Denies side effects of medication.  - Anxiety is well-controlled. Denies panic attacks.     Current Outpatient Medications   Medication Sig Dispense Refill    PARoxetine (PAXIL) 10 MG tablet Take 1 tablet by mouth every morning 90 tablet 1    OTEZLA 30 MG TABS TAKE 1 TABLET BY MOUTH TWICE  DAILY 60 tablet 1    estradiol (CLIMARA) 0.025 MG/24HR Place 1 patch onto the skin every 7 days 12 patch 1    hydroxychloroquine (PLAQUENIL) 200 MG tablet Take 1 tablet by mouth 2 times daily 60 tablet 3    progesterone micronized powder Progesterone SR 150mg one tab po q hs (Disp: 3mo supply) 1 bottle=1 month 90 each 3     No current facility-administered medications for this visit.      Allergies   Allergen Reactions    Phenylbutazones     Tigan [Trimethobenzamide Hcl] Other (See Comments)     hyperactive    Vancomycin     Reglan [Metoclopramide] Anxiety       Past Medical & Surgical History:      Diagnosis Date    Anxiety     Cutaneous lupus erythematosus 09/2020    follows with [facility]    HLD (hyperlipidemia)     Leg swelling     Other disorders of kidney and ureter     right kidney      Past Surgical History:   Procedure Laterality Date    BREAST SURGERY      CHOLECYSTECTOMY      KIDNEY SURGERY      as an infant    RAYMOND AND BSO (CERVIX REMOVED)      TONSILLECTOMY AND ADENOIDECTOMY      UMBILICAL HERNIA REPAIR         Family History:      Problem Relation Age of Onset    Hypertension Mother     Diabetes Mother     Diabetes Father     High Cholesterol Father     No Known Problems Sister     Cervical Cancer Maternal Grandmother     Lung Cancer Paternal Grandfather     No Known Problems Daughter     Hypertension Son  
You can access the FollowMyHealth Patient Portal offered by [facility] by registering at the following website: [email or web address]. By joining [facility]’s FollowMyHealth portal, you will also be able to view your health information using other applications (apps) compatible with our system.

## 2025-05-09 NOTE — OB RN PATIENT PROFILE - SUPPORT PERSON NAME
Patient was seen on 4/22/25 for urinary frequency, POCT urine dip and urine culture was negative.    She is now reporting frequent urination and back pain. Asking for UA order to be placed.    Toi sosa

## 2025-06-04 ENCOUNTER — APPOINTMENT (OUTPATIENT)
Dept: OBGYN | Facility: HOSPITAL | Age: 40
End: 2025-06-04

## 2025-06-04 ENCOUNTER — OUTPATIENT (OUTPATIENT)
Dept: OUTPATIENT SERVICES | Facility: HOSPITAL | Age: 40
LOS: 1 days | End: 2025-06-04

## 2025-06-04 ENCOUNTER — RESULT REVIEW (OUTPATIENT)
Age: 40
End: 2025-06-04

## 2025-06-04 VITALS
SYSTOLIC BLOOD PRESSURE: 113 MMHG | DIASTOLIC BLOOD PRESSURE: 74 MMHG | BODY MASS INDEX: 36.8 KG/M2 | TEMPERATURE: 98 F | WEIGHT: 200 LBS | HEART RATE: 89 BPM | HEIGHT: 62 IN

## 2025-06-04 DIAGNOSIS — Z11.3 ENCOUNTER FOR SCREENING FOR INFECTIONS WITH A PREDOMINANTLY SEXUAL MODE OF TRANSMISSION: ICD-10-CM

## 2025-06-04 DIAGNOSIS — Z98.890 OTHER SPECIFIED POSTPROCEDURAL STATES: Chronic | ICD-10-CM

## 2025-06-04 DIAGNOSIS — Z30.09 ENCOUNTER FOR OTHER GENERAL COUNSELING AND ADVICE ON CONTRACEPTION: ICD-10-CM

## 2025-06-04 PROCEDURE — 99213 OFFICE O/P EST LOW 20 MIN: CPT

## 2025-06-05 DIAGNOSIS — Z30.09 ENCOUNTER FOR OTHER GENERAL COUNSELING AND ADVICE ON CONTRACEPTION: ICD-10-CM

## 2025-06-05 DIAGNOSIS — Z11.3 ENCOUNTER FOR SCREENING FOR INFECTIONS WITH A PREDOMINANTLY SEXUAL MODE OF TRANSMISSION: ICD-10-CM

## 2025-06-05 LAB
C TRACH RRNA SPEC QL NAA+PROBE: SIGNIFICANT CHANGE UP
N GONORRHOEA RRNA SPEC QL NAA+PROBE: SIGNIFICANT CHANGE UP
SPECIMEN SOURCE: SIGNIFICANT CHANGE UP

## 2025-08-07 ENCOUNTER — NON-APPOINTMENT (OUTPATIENT)
Age: 40
End: 2025-08-07

## (undated) DEVICE — GOWN LG

## (undated) DEVICE — DRSG TELFA 3 X 8

## (undated) DEVICE — Device

## (undated) DEVICE — GLV 6.5 PROTEXIS (WHITE)

## (undated) DEVICE — DRAPE IRRIGATION POUCH 19X23"

## (undated) DEVICE — SOL IRR POUR H2O 500ML

## (undated) DEVICE — GLV 7 PROTEXIS (BLUE)

## (undated) DEVICE — PACK D&C

## (undated) DEVICE — WARMING BLANKET UPPER ADULT

## (undated) DEVICE — VENODYNE/SCD SLEEVE CALF MEDIUM

## (undated) DEVICE — TUBING IRR SET FOR CYSTOSCOPY 77"

## (undated) DEVICE — TUBING SUCTION NONCONDUCTIVE 6MM X 12FT

## (undated) DEVICE — PACK PERI GYN

## (undated) DEVICE — CANISTER SUCTION HI FLOW W FILTER 1200CC

## (undated) DEVICE — POSITIONER STRAP ARMBOARD VELCRO TS-30

## (undated) DEVICE — WARMING BLANKET FULL ADULT

## (undated) DEVICE — BASIN SET DOUBLE

## (undated) DEVICE — GOWN XL

## (undated) DEVICE — LABELS BLANK W PEN

## (undated) DEVICE — ELCTR BOVIE TIP BLADE INSULATED 2.75" EDGE

## (undated) DEVICE — SOL IRR BAG NS 0.9% 1000ML

## (undated) DEVICE — PREP BETADINE SPONGE STICKS

## (undated) DEVICE — POSITIONER PINK PAD PIGAZZI SYSTEM

## (undated) DEVICE — DRSG CURITY GAUZE SPONGE 4 X 4" 12-PLY

## (undated) DEVICE — PROTECTOR HEEL / ELBOW FLUFFY

## (undated) DEVICE — PRESSURE INFUSOR BAG 1000ML

## (undated) DEVICE — DRAPE LIGHT HANDLE COVER (GREEN)